# Patient Record
Sex: FEMALE | Race: WHITE | NOT HISPANIC OR LATINO | URBAN - METROPOLITAN AREA
[De-identification: names, ages, dates, MRNs, and addresses within clinical notes are randomized per-mention and may not be internally consistent; named-entity substitution may affect disease eponyms.]

---

## 2017-04-03 ENCOUNTER — INPATIENT (INPATIENT)
Facility: HOSPITAL | Age: 72
LOS: 3 days | Discharge: HOME | End: 2017-04-07
Attending: INTERNAL MEDICINE | Admitting: INTERNAL MEDICINE

## 2017-04-27 PROBLEM — Z00.00 ENCOUNTER FOR PREVENTIVE HEALTH EXAMINATION: Status: ACTIVE | Noted: 2017-04-27

## 2017-06-27 DIAGNOSIS — F17.200 NICOTINE DEPENDENCE, UNSPECIFIED, UNCOMPLICATED: ICD-10-CM

## 2017-06-27 DIAGNOSIS — J44.1 CHRONIC OBSTRUCTIVE PULMONARY DISEASE WITH (ACUTE) EXACERBATION: ICD-10-CM

## 2017-06-27 DIAGNOSIS — I50.9 HEART FAILURE, UNSPECIFIED: ICD-10-CM

## 2017-06-27 DIAGNOSIS — J44.9 CHRONIC OBSTRUCTIVE PULMONARY DISEASE, UNSPECIFIED: ICD-10-CM

## 2017-06-27 DIAGNOSIS — K21.9 GASTRO-ESOPHAGEAL REFLUX DISEASE WITHOUT ESOPHAGITIS: ICD-10-CM

## 2017-06-27 DIAGNOSIS — J96.10 CHRONIC RESPIRATORY FAILURE, UNSPECIFIED WHETHER WITH HYPOXIA OR HYPERCAPNIA: ICD-10-CM

## 2017-06-27 DIAGNOSIS — F41.1 GENERALIZED ANXIETY DISORDER: ICD-10-CM

## 2017-06-27 DIAGNOSIS — I11.0 HYPERTENSIVE HEART DISEASE WITH HEART FAILURE: ICD-10-CM

## 2017-06-27 DIAGNOSIS — Z99.81 DEPENDENCE ON SUPPLEMENTAL OXYGEN: ICD-10-CM

## 2017-07-26 ENCOUNTER — INPATIENT (INPATIENT)
Facility: HOSPITAL | Age: 72
LOS: 3 days | Discharge: HOME | End: 2017-07-30
Attending: INTERNAL MEDICINE | Admitting: INTERNAL MEDICINE

## 2017-07-26 DIAGNOSIS — J44.1 CHRONIC OBSTRUCTIVE PULMONARY DISEASE WITH (ACUTE) EXACERBATION: ICD-10-CM

## 2017-07-26 DIAGNOSIS — I10 ESSENTIAL (PRIMARY) HYPERTENSION: ICD-10-CM

## 2017-08-01 DIAGNOSIS — J44.1 CHRONIC OBSTRUCTIVE PULMONARY DISEASE WITH (ACUTE) EXACERBATION: ICD-10-CM

## 2017-08-01 DIAGNOSIS — Z87.891 PERSONAL HISTORY OF NICOTINE DEPENDENCE: ICD-10-CM

## 2017-08-01 DIAGNOSIS — K21.9 GASTRO-ESOPHAGEAL REFLUX DISEASE WITHOUT ESOPHAGITIS: ICD-10-CM

## 2017-08-01 DIAGNOSIS — J20.9 ACUTE BRONCHITIS, UNSPECIFIED: ICD-10-CM

## 2017-08-01 DIAGNOSIS — F41.9 ANXIETY DISORDER, UNSPECIFIED: ICD-10-CM

## 2017-08-01 DIAGNOSIS — Z99.81 DEPENDENCE ON SUPPLEMENTAL OXYGEN: ICD-10-CM

## 2017-08-01 DIAGNOSIS — R06.02 SHORTNESS OF BREATH: ICD-10-CM

## 2017-08-01 DIAGNOSIS — I10 ESSENTIAL (PRIMARY) HYPERTENSION: ICD-10-CM

## 2017-08-01 DIAGNOSIS — J44.0 CHRONIC OBSTRUCTIVE PULMONARY DISEASE WITH ACUTE LOWER RESPIRATORY INFECTION: ICD-10-CM

## 2017-09-06 ENCOUNTER — INPATIENT (INPATIENT)
Facility: HOSPITAL | Age: 72
LOS: 2 days | Discharge: HOME | End: 2017-09-09
Attending: INTERNAL MEDICINE | Admitting: INTERNAL MEDICINE

## 2017-09-06 DIAGNOSIS — J44.1 CHRONIC OBSTRUCTIVE PULMONARY DISEASE WITH (ACUTE) EXACERBATION: ICD-10-CM

## 2017-09-06 DIAGNOSIS — I10 ESSENTIAL (PRIMARY) HYPERTENSION: ICD-10-CM

## 2017-09-12 DIAGNOSIS — Z72.0 TOBACCO USE: ICD-10-CM

## 2017-09-12 DIAGNOSIS — I10 ESSENTIAL (PRIMARY) HYPERTENSION: ICD-10-CM

## 2017-09-12 DIAGNOSIS — F43.22 ADJUSTMENT DISORDER WITH ANXIETY: ICD-10-CM

## 2017-09-12 DIAGNOSIS — R26.9 UNSPECIFIED ABNORMALITIES OF GAIT AND MOBILITY: ICD-10-CM

## 2017-09-12 DIAGNOSIS — J44.1 CHRONIC OBSTRUCTIVE PULMONARY DISEASE WITH (ACUTE) EXACERBATION: ICD-10-CM

## 2017-09-12 DIAGNOSIS — F41.1 GENERALIZED ANXIETY DISORDER: ICD-10-CM

## 2017-09-12 DIAGNOSIS — Z99.81 DEPENDENCE ON SUPPLEMENTAL OXYGEN: ICD-10-CM

## 2017-09-12 DIAGNOSIS — K21.9 GASTRO-ESOPHAGEAL REFLUX DISEASE WITHOUT ESOPHAGITIS: ICD-10-CM

## 2017-10-30 ENCOUNTER — INPATIENT (INPATIENT)
Facility: HOSPITAL | Age: 72
LOS: 3 days | Discharge: HOME | End: 2017-11-03
Attending: INTERNAL MEDICINE | Admitting: INTERNAL MEDICINE

## 2017-10-30 DIAGNOSIS — J44.1 CHRONIC OBSTRUCTIVE PULMONARY DISEASE WITH (ACUTE) EXACERBATION: ICD-10-CM

## 2017-10-30 DIAGNOSIS — I10 ESSENTIAL (PRIMARY) HYPERTENSION: ICD-10-CM

## 2017-11-07 DIAGNOSIS — F41.9 ANXIETY DISORDER, UNSPECIFIED: ICD-10-CM

## 2017-11-07 DIAGNOSIS — K21.9 GASTRO-ESOPHAGEAL REFLUX DISEASE WITHOUT ESOPHAGITIS: ICD-10-CM

## 2017-11-07 DIAGNOSIS — Z72.0 TOBACCO USE: ICD-10-CM

## 2017-11-07 DIAGNOSIS — I10 ESSENTIAL (PRIMARY) HYPERTENSION: ICD-10-CM

## 2017-11-07 DIAGNOSIS — J44.1 CHRONIC OBSTRUCTIVE PULMONARY DISEASE WITH (ACUTE) EXACERBATION: ICD-10-CM

## 2018-07-15 ENCOUNTER — TRANSCRIPTION ENCOUNTER (OUTPATIENT)
Age: 73
End: 2018-07-15

## 2019-05-23 ENCOUNTER — INPATIENT (INPATIENT)
Facility: HOSPITAL | Age: 74
LOS: 4 days | Discharge: HOME HEALTH | End: 2019-05-28
Attending: HOSPITALIST | Admitting: HOSPITALIST
Payer: MEDICARE

## 2019-05-23 VITALS
TEMPERATURE: 97 F | WEIGHT: 132.06 LBS | SYSTOLIC BLOOD PRESSURE: 184 MMHG | OXYGEN SATURATION: 100 % | HEART RATE: 100 BPM | DIASTOLIC BLOOD PRESSURE: 130 MMHG | HEIGHT: 62 IN | RESPIRATION RATE: 47 BRPM

## 2019-05-23 DIAGNOSIS — Z90.710 ACQUIRED ABSENCE OF BOTH CERVIX AND UTERUS: Chronic | ICD-10-CM

## 2019-05-23 DIAGNOSIS — Z90.49 ACQUIRED ABSENCE OF OTHER SPECIFIED PARTS OF DIGESTIVE TRACT: Chronic | ICD-10-CM

## 2019-05-23 LAB
ALBUMIN SERPL ELPH-MCNC: 4.4 G/DL — SIGNIFICANT CHANGE UP (ref 3.5–5.2)
ALP SERPL-CCNC: 98 U/L — SIGNIFICANT CHANGE UP (ref 30–115)
ALT FLD-CCNC: 14 U/L — SIGNIFICANT CHANGE UP (ref 0–41)
ANION GAP SERPL CALC-SCNC: 12 MMOL/L — SIGNIFICANT CHANGE UP (ref 7–14)
APPEARANCE UR: CLEAR — SIGNIFICANT CHANGE UP
APTT BLD: 29.2 SEC — SIGNIFICANT CHANGE UP (ref 27–39.2)
AST SERPL-CCNC: 14 U/L — SIGNIFICANT CHANGE UP (ref 0–41)
BASE EXCESS BLDV CALC-SCNC: 4.9 MMOL/L — HIGH (ref -2–2)
BASOPHILS # BLD AUTO: 0.07 K/UL — SIGNIFICANT CHANGE UP (ref 0–0.2)
BASOPHILS NFR BLD AUTO: 0.5 % — SIGNIFICANT CHANGE UP (ref 0–1)
BILIRUB SERPL-MCNC: 0.5 MG/DL — SIGNIFICANT CHANGE UP (ref 0.2–1.2)
BILIRUB UR-MCNC: NEGATIVE — SIGNIFICANT CHANGE UP
BUN SERPL-MCNC: 11 MG/DL — SIGNIFICANT CHANGE UP (ref 10–20)
CA-I SERPL-SCNC: 1.24 MMOL/L — SIGNIFICANT CHANGE UP (ref 1.12–1.3)
CALCIUM SERPL-MCNC: 9.7 MG/DL — SIGNIFICANT CHANGE UP (ref 8.5–10.1)
CHLORIDE SERPL-SCNC: 97 MMOL/L — LOW (ref 98–110)
CO2 SERPL-SCNC: 28 MMOL/L — SIGNIFICANT CHANGE UP (ref 17–32)
COLOR SPEC: YELLOW — SIGNIFICANT CHANGE UP
CREAT SERPL-MCNC: 0.7 MG/DL — SIGNIFICANT CHANGE UP (ref 0.7–1.5)
DIFF PNL FLD: ABNORMAL
EOSINOPHIL # BLD AUTO: 0.3 K/UL — SIGNIFICANT CHANGE UP (ref 0–0.7)
EOSINOPHIL NFR BLD AUTO: 2.1 % — SIGNIFICANT CHANGE UP (ref 0–8)
EPI CELLS # UR: ABNORMAL /HPF
GAS PNL BLDA: SIGNIFICANT CHANGE UP
GAS PNL BLDV: 140 MMOL/L — SIGNIFICANT CHANGE UP (ref 136–145)
GAS PNL BLDV: SIGNIFICANT CHANGE UP
GLUCOSE SERPL-MCNC: 149 MG/DL — HIGH (ref 70–99)
GLUCOSE UR QL: NEGATIVE MG/DL — SIGNIFICANT CHANGE UP
HCO3 BLDV-SCNC: 34 MMOL/L — HIGH (ref 22–29)
HCT VFR BLD CALC: 44.9 % — SIGNIFICANT CHANGE UP (ref 37–47)
HCT VFR BLDA CALC: 45.9 % — HIGH (ref 34–44)
HGB BLD CALC-MCNC: 15 G/DL — SIGNIFICANT CHANGE UP (ref 14–18)
HGB BLD-MCNC: 14.2 G/DL — SIGNIFICANT CHANGE UP (ref 12–16)
IMM GRANULOCYTES NFR BLD AUTO: 0.7 % — HIGH (ref 0.1–0.3)
INR BLD: 0.96 RATIO — SIGNIFICANT CHANGE UP (ref 0.65–1.3)
KETONES UR-MCNC: NEGATIVE — SIGNIFICANT CHANGE UP
LACTATE BLDV-MCNC: 0.7 MMOL/L — SIGNIFICANT CHANGE UP (ref 0.5–1.6)
LEUKOCYTE ESTERASE UR-ACNC: NEGATIVE — SIGNIFICANT CHANGE UP
LYMPHOCYTES # BLD AUTO: 1.44 K/UL — SIGNIFICANT CHANGE UP (ref 1.2–3.4)
LYMPHOCYTES # BLD AUTO: 9.9 % — LOW (ref 20.5–51.1)
MCHC RBC-ENTMCNC: 31.1 PG — HIGH (ref 27–31)
MCHC RBC-ENTMCNC: 31.6 G/DL — LOW (ref 32–37)
MCV RBC AUTO: 98.2 FL — SIGNIFICANT CHANGE UP (ref 81–99)
MONOCYTES # BLD AUTO: 0.91 K/UL — HIGH (ref 0.1–0.6)
MONOCYTES NFR BLD AUTO: 6.2 % — SIGNIFICANT CHANGE UP (ref 1.7–9.3)
NEUTROPHILS # BLD AUTO: 11.75 K/UL — HIGH (ref 1.4–6.5)
NEUTROPHILS NFR BLD AUTO: 80.6 % — HIGH (ref 42.2–75.2)
NITRITE UR-MCNC: NEGATIVE — SIGNIFICANT CHANGE UP
NRBC # BLD: 0 /100 WBCS — SIGNIFICANT CHANGE UP (ref 0–0)
NT-PROBNP SERPL-SCNC: 137 PG/ML — SIGNIFICANT CHANGE UP (ref 0–300)
PCO2 BLDV: 70 MMHG — HIGH (ref 41–51)
PH BLDV: 7.3 — SIGNIFICANT CHANGE UP (ref 7.26–7.43)
PH UR: 7 — SIGNIFICANT CHANGE UP (ref 5–8)
PLATELET # BLD AUTO: 307 K/UL — SIGNIFICANT CHANGE UP (ref 130–400)
PO2 BLDV: 71 MMHG — HIGH (ref 20–40)
POTASSIUM BLDV-SCNC: 4.3 MMOL/L — SIGNIFICANT CHANGE UP (ref 3.3–5.6)
POTASSIUM SERPL-MCNC: 4.7 MMOL/L — SIGNIFICANT CHANGE UP (ref 3.5–5)
POTASSIUM SERPL-SCNC: 4.7 MMOL/L — SIGNIFICANT CHANGE UP (ref 3.5–5)
PROT SERPL-MCNC: 7.1 G/DL — SIGNIFICANT CHANGE UP (ref 6–8)
PROT UR-MCNC: ABNORMAL MG/DL
PROTHROM AB SERPL-ACNC: 11 SEC — SIGNIFICANT CHANGE UP (ref 9.95–12.87)
RBC # BLD: 4.57 M/UL — SIGNIFICANT CHANGE UP (ref 4.2–5.4)
RBC # FLD: 13 % — SIGNIFICANT CHANGE UP (ref 11.5–14.5)
RBC CASTS # UR COMP ASSIST: ABNORMAL /HPF
SAO2 % BLDV: 93 % — SIGNIFICANT CHANGE UP
SODIUM SERPL-SCNC: 137 MMOL/L — SIGNIFICANT CHANGE UP (ref 135–146)
SP GR SPEC: 1.01 — SIGNIFICANT CHANGE UP (ref 1.01–1.03)
TROPONIN T SERPL-MCNC: <0.01 NG/ML — SIGNIFICANT CHANGE UP
UROBILINOGEN FLD QL: 0.2 MG/DL — SIGNIFICANT CHANGE UP (ref 0.2–0.2)
WBC # BLD: 14.57 K/UL — HIGH (ref 4.8–10.8)
WBC # FLD AUTO: 14.57 K/UL — HIGH (ref 4.8–10.8)

## 2019-05-23 PROCEDURE — 76604 US EXAM CHEST: CPT | Mod: 26,GC

## 2019-05-23 PROCEDURE — 99291 CRITICAL CARE FIRST HOUR: CPT | Mod: 25,GC

## 2019-05-23 PROCEDURE — 71045 X-RAY EXAM CHEST 1 VIEW: CPT | Mod: 26

## 2019-05-23 PROCEDURE — 93010 ELECTROCARDIOGRAM REPORT: CPT

## 2019-05-23 RX ORDER — IPRATROPIUM/ALBUTEROL SULFATE 18-103MCG
3 AEROSOL WITH ADAPTER (GRAM) INHALATION
Refills: 0 | Status: COMPLETED | OUTPATIENT
Start: 2019-05-23 | End: 2019-05-23

## 2019-05-23 RX ORDER — ACETAMINOPHEN 500 MG
650 TABLET ORAL EVERY 6 HOURS
Refills: 0 | Status: DISCONTINUED | OUTPATIENT
Start: 2019-05-23 | End: 2019-05-28

## 2019-05-23 RX ORDER — AMLODIPINE BESYLATE 2.5 MG/1
5 TABLET ORAL DAILY
Refills: 0 | Status: DISCONTINUED | OUTPATIENT
Start: 2019-05-23 | End: 2019-05-27

## 2019-05-23 RX ORDER — ENOXAPARIN SODIUM 100 MG/ML
40 INJECTION SUBCUTANEOUS DAILY
Refills: 0 | Status: DISCONTINUED | OUTPATIENT
Start: 2019-05-23 | End: 2019-05-28

## 2019-05-23 RX ORDER — TIOTROPIUM BROMIDE 18 UG/1
1 CAPSULE ORAL; RESPIRATORY (INHALATION) DAILY
Refills: 0 | Status: DISCONTINUED | OUTPATIENT
Start: 2019-05-23 | End: 2019-05-24

## 2019-05-23 RX ORDER — CEFTRIAXONE 500 MG/1
1 INJECTION, POWDER, FOR SOLUTION INTRAMUSCULAR; INTRAVENOUS ONCE
Refills: 0 | Status: COMPLETED | OUTPATIENT
Start: 2019-05-23 | End: 2019-05-23

## 2019-05-23 RX ORDER — ALPRAZOLAM 0.25 MG
0.25 TABLET ORAL
Refills: 0 | Status: DISCONTINUED | OUTPATIENT
Start: 2019-05-23 | End: 2019-05-28

## 2019-05-23 RX ORDER — IPRATROPIUM/ALBUTEROL SULFATE 18-103MCG
3 AEROSOL WITH ADAPTER (GRAM) INHALATION EVERY 4 HOURS
Refills: 0 | Status: DISCONTINUED | OUTPATIENT
Start: 2019-05-23 | End: 2019-05-28

## 2019-05-23 RX ORDER — CEFTRIAXONE 500 MG/1
INJECTION, POWDER, FOR SOLUTION INTRAMUSCULAR; INTRAVENOUS
Refills: 0 | Status: DISCONTINUED | OUTPATIENT
Start: 2019-05-23 | End: 2019-05-24

## 2019-05-23 RX ORDER — ONDANSETRON 8 MG/1
4 TABLET, FILM COATED ORAL ONCE
Refills: 0 | Status: COMPLETED | OUTPATIENT
Start: 2019-05-23 | End: 2019-05-23

## 2019-05-23 RX ORDER — BUDESONIDE AND FORMOTEROL FUMARATE DIHYDRATE 160; 4.5 UG/1; UG/1
2 AEROSOL RESPIRATORY (INHALATION)
Refills: 0 | Status: DISCONTINUED | OUTPATIENT
Start: 2019-05-23 | End: 2019-05-28

## 2019-05-23 RX ORDER — CEFTRIAXONE 500 MG/1
1 INJECTION, POWDER, FOR SOLUTION INTRAMUSCULAR; INTRAVENOUS EVERY 24 HOURS
Refills: 0 | Status: DISCONTINUED | OUTPATIENT
Start: 2019-05-24 | End: 2019-05-24

## 2019-05-23 RX ORDER — CHLORHEXIDINE GLUCONATE 213 G/1000ML
1 SOLUTION TOPICAL ONCE
Refills: 0 | Status: COMPLETED | OUTPATIENT
Start: 2019-05-23 | End: 2019-05-23

## 2019-05-23 RX ADMIN — Medication 650 MILLIGRAM(S): at 20:53

## 2019-05-23 RX ADMIN — Medication 60 MILLIGRAM(S): at 21:05

## 2019-05-23 RX ADMIN — AMLODIPINE BESYLATE 5 MILLIGRAM(S): 2.5 TABLET ORAL at 15:15

## 2019-05-23 RX ADMIN — Medication 3 MILLILITER(S): at 09:45

## 2019-05-23 RX ADMIN — Medication 5 MILLIGRAM(S): at 15:15

## 2019-05-23 RX ADMIN — Medication 0.25 MILLIGRAM(S): at 15:16

## 2019-05-23 RX ADMIN — Medication 0.25 MILLIGRAM(S): at 22:50

## 2019-05-23 RX ADMIN — TIOTROPIUM BROMIDE 1 CAPSULE(S): 18 CAPSULE ORAL; RESPIRATORY (INHALATION) at 17:42

## 2019-05-23 RX ADMIN — Medication 3 MILLILITER(S): at 17:25

## 2019-05-23 RX ADMIN — Medication 3 MILLILITER(S): at 09:25

## 2019-05-23 RX ADMIN — Medication 3 MILLILITER(S): at 22:44

## 2019-05-23 RX ADMIN — Medication 3 MILLILITER(S): at 09:44

## 2019-05-23 RX ADMIN — Medication 125 MILLIGRAM(S): at 09:24

## 2019-05-23 RX ADMIN — CEFTRIAXONE 100 GRAM(S): 500 INJECTION, POWDER, FOR SOLUTION INTRAMUSCULAR; INTRAVENOUS at 17:38

## 2019-05-23 RX ADMIN — ONDANSETRON 4 MILLIGRAM(S): 8 TABLET, FILM COATED ORAL at 09:24

## 2019-05-23 NOTE — H&P ADULT - NSICDXPASTMEDICALHX_GEN_ALL_CORE_FT
PAST MEDICAL HISTORY:  Anxiety     COPD (chronic obstructive pulmonary disease)     HTN (hypertension)

## 2019-05-23 NOTE — ED PROVIDER NOTE - PHYSICAL EXAMINATION
CONSTITUTIONAL: Well-developed; well-nourished; in no acute distress.   SKIN: warm, dry  HEAD: Normocephalic; atraumatic.  EYES: PERRL, no conjunctival erythema  ENT: No nasal discharge; airway clear.  NECK: Supple; non tender.  CARD: S1, S2 normal;  Regular rate and rhythm.   RESP: + wheezing bilaterally, + tachypnea, + retractions, speaking few words at a time.   ABD: soft non tender, non distended, no rebound or guarding  EXT: Normal ROM.    LYMPH: No acute cervical adenopathy.  NEURO: Alert, oriented, grossly unremarkable.

## 2019-05-23 NOTE — ED PROVIDER NOTE - OBJECTIVE STATEMENT
72 yo F pmh of HTN, COPD presents with shortness of breath that started at 11pm last night and was worsening throughout the night. This morning felt like she couldn't breath so called EMS. When EMS arrived, found patient to be hypoxic to 80% RA improved to 90s with oxygen. Patient denies any chest pain. + N, no vomiting or diarrhea, no abdominal pain. no fevers or recent illness.

## 2019-05-23 NOTE — ED ADULT NURSE NOTE - INTERVENTIONS DEFINITIONS
Provide visual cue, wrist band, yellow gown, etc./Monitor for mental status changes and reorient to person, place, and time/Review medications for side effects contributing to fall risk/Stretcher in lowest position, wheels locked, appropriate side rails in place/Reinforce activity limits and safety measures with patient and family/Room bathroom lighting operational/Non-slip footwear when patient is off stretcher/Physically safe environment: no spills, clutter or unnecessary equipment/Provide visual clues: red socks/Monitor gait and stability/[place] to call system/Instruct patient to call for assistance/Call bell, personal items and telephone within reach

## 2019-05-23 NOTE — H&P ADULT - NSHPPHYSICALEXAM_GEN_ALL_CORE
T(F): 97.4 (05-23-19 @ 09:15), Max: 97.4 (05-23-19 @ 09:15)  HR: 82 (05-23-19 @ 12:02) (65 - 100)  BP: 111/80 (05-23-19 @ 12:02) (109/72 - 184/130)  RR: 25 (05-23-19 @ 12:02) (21 - 47)  SpO2: 99% (05-23-19 @ 12:02) (91% - 100%)    PHYSICAL EXAM:  GEN: No acute distress  HEENT:   LUNGS: Clear to auscultation bilaterally   HEART: S1/S2 present. RRR.   ABD: Soft, non-tender, non-distended. Bowel sounds present  EXT:  NEURO: AAOX3 T(F): 97.4 (05-23-19 @ 09:15), Max: 97.4 (05-23-19 @ 09:15)  HR: 82 (05-23-19 @ 12:02) (65 - 100)  BP: 111/80 (05-23-19 @ 12:02) (109/72 - 184/130)  RR: 25 (05-23-19 @ 12:02) (21 - 47)  SpO2: 99% (05-23-19 @ 12:02) (91% - 100%)    PHYSICAL EXAM:  GEN: No acute distress  HEENT: wnl  LUNGS: no wheezing buy overall decreased BS throughout  HEART: S1/S2 present. RRR   ABD: Soft, non-tender, non-distended.  EXT: no edema  NEURO: AAOX3

## 2019-05-23 NOTE — ED PROVIDER NOTE - NS ED ROS FT
Eyes:  No visual changes, eye pain or discharge.  ENMT:  no sore throat or runny nose  Cardiac:  No chest pain,   Respiratory:  No cough + difficulty breathing that started at 11pm last night and continued to worsen.    GI:  +nausea, no vomiting, diarrhea or abdominal pain.  :  No dysuria, frequency or burning.  MS:  No joint pain or back pain.  Neuro:  No headache  Skin:  No skin rash.   Endocrine: No history of thyroid disease or diabetes.

## 2019-05-23 NOTE — ED ADULT NURSE NOTE - PMH
COPD (chronic obstructive pulmonary disease)    HTN (hypertension) Anxiety    COPD (chronic obstructive pulmonary disease)    HTN (hypertension)

## 2019-05-23 NOTE — H&P ADULT - NSICDXFAMILYHX_GEN_ALL_CORE_FT
FAMILY HISTORY:  Family history of congestive heart failure  Family history of COPD (chronic obstructive pulmonary disease), In father

## 2019-05-23 NOTE — H&P ADULT - ATTENDING COMMENTS
Pt seen and examined at bedside independently. Pt had recent COPD exacerbation 2 weeks ago. Currently on prednisone taper. Recent exposure to bleach in her home. Last night had worsening SOB. No change in cough, sputum amount or purulence. Uses o2 PRN and at night, had increased O2 needs. Used numerous nebulizer treatments without improvement. Pt's sister says she has obsessive tendencies and sometimes these obsessions lead to panic attacks which causes her to feel SOB.     Today pt felt progressively worse. She called an ambulance, her o2 was in low 80s. When she came to ED she was in respiratory distress, had diffuse wheezing. She was placed on bipap and given solumedrol with significant improvement.    PHYSICAL EXAM:  GENERAL: NAD, speaks in full sentences, no signs of respiratory distress  HEAD:  Atraumatic, Normocephalic  EYES: Anicteric  NECK: Supple, No JVD  CHEST/LUNG: Decreased air movement bilaterally, no wheeing  HEART: Regular rate and rhythm; No murmurs;   ABDOMEN: Soft, Nontender, Nondistended; Bowel sounds present; No guarding  EXTREMITIES:  2+ Peripheral Pulses, No cyanosis or edema  PSYCH: AAOx3  NEUROLOGY: non-focal  SKIN: No rashes or lesions    Shortness of breath/ Acute on chronic hypoxic respiratory failure  -likely due to COPD exacerbation  -o2 PRN  -spo2 goal 92%  -c/w solumedrol, transition to prednisone in am  -no need for antibiotics at this point and no change in amount or purulence of sputum.    Anxiety/obsessive  tendencies  -pt requesting to speak with psychiatry  -pt started on paxil yesterday by PMD  -xanax dose to be confirmed by pharmacy.

## 2019-05-23 NOTE — ED PROVIDER NOTE - CRITICAL CARE PROVIDED
interpretation of diagnostic studies/consultation with other physicians/conducted a detailed discussion of DNR status/documentation/additional history taking/direct patient care (not related to procedure)

## 2019-05-23 NOTE — H&P ADULT - HISTORY OF PRESENT ILLNESS
73 F with PMH of HTN/GERD/COPD on prn oxygen 2L, Pulm- DR. Zhang, comes in with worsening SOB since 1 day, last night she took nebs 3 times and her inhalers, and did not improve. she had worsening of her baseline sob 2 weeks back and she took prednisone taper 20 mg for 1 week and 10 mg for this week as prescribed by dr. zhang. former smoker- quit 1 year ago. Denies fever/chills/ n/v/abd pain/diarrhoea/any worsening cough/sputum.   she lives with son and sister lives upstairs. walks w/o assistance although has a walker.   in ED- received solumedrol, alb nebs. was placed on NIV 12/6 40%, now feels much better and was weaned off NIV during admisison. 73 F with PMH of HTN/GERD/COPD on prn oxygen 2L, Pulm- DR. Zhang, comes in with worsening SOB since 1 day, last night she took nebs 3 times and her inhalers, and did not improve. she had worsening of her baseline sob 2 weeks back and she took prednisone taper 20 mg for 1 week and 10 mg for this week as prescribed by dr. zhang. she does have mild cough at baseline. former smoker- quit 1 year ago. Denies fever/chills/ n/v/abd pain/diarrhoea/any worsening sputum.   she lives with son and sister lives upstairs. walks w/o assistance although has a walker.   in ED- received solumedrol, alb nebs. was placed on NIV 12/6 40%, now feels much better and was weaned off NIV during admisison.

## 2019-05-23 NOTE — ED PROVIDER NOTE - ATTENDING CONTRIBUTION TO CARE
Patient was a pre-arrival notification for acute respiratory distress with EMS. Respiratory called and Critical Care team awaited patient arrival.  Patient in acute respiratory distress with tachypnea and retractions.  She was immediately placed on BIPAP.  Bedside lung ultrasound done and shows no signs of fluid overload. Lung exam consistent with wheezing and clinical picture is a COPD exacerbation. Patient closely watched at bedside on BIPAP, large bore IV placed and labs sent.  Initial venous gas consistent with acute respiratory acidosis.  EKG as documented and shows no acute ischemia.  CXR, labs, urine being done.    Patient improving on BIPAP.  More history taken and as follows:    72 yo F PMH HTN, COPD presents with shortness of breath that started at 11pm last night and was worsening throughout the night. This morning felt like she couldn't breath so called EMS. When EMS arrived, found patient to be hypoxic to 80% RA improved to 90s with oxygen. Patient denies any chest pain. + N, no vomiting or diarrhea, no abdominal pain. no fevers or recent illness.    Patient has expiratory wheezing on exam.  No calf tenderness or peripheral edema.

## 2019-05-23 NOTE — ED PROVIDER NOTE - CLINICAL SUMMARY MEDICAL DECISION MAKING FREE TEXT BOX
Patient was a pre-arrival notification for acute respiratory distress with EMS. Respiratory called and Critical Care team awaited patient arrival.  Patient in acute respiratory distress with tachypnea and retractions.  She was immediately placed on BIPAP.  Bedside lung ultrasound done and shows no signs of fluid overload. Lung exam consistent with wheezing and clinical picture is a COPD exacerbation. Patient closely watched at bedside on BIPAP, large bore IV placed and labs sent.  Initial venous gas consistent with acute respiratory acidosis.  EKG as documented and shows no acute ischemia.  CXR, labs, urine done. Patient has elevated WBC count.  No pneumonia on CXR.  ABG done and results reviewed.  Patient closely monitored for hours and remained hemodynamically stable.  She shows no signs of tiring out and feel much much improved. Patient using BIPAP prn.  She does not require ICU admission at this time.  Will admit to Medicine for continued steroids and nebs, monitoring.  Patient may require ICU evaluation if her condition worsens. Case endorsed to medical admitting team.

## 2019-05-23 NOTE — H&P ADULT - ASSESSMENT
73 F with PMH of HTN/GERD/COPD on prn oxygen 2L, Pulm- DR. Zhang, comes in with worsening SOB since 1 day, last night she took nebs 3 times and her inhalers, and did not improve. she had worsening of her baseline sob 2 weeks back and she took prednisone taper 20 mg for 1 week and 10 mg for this week as prescribed by dr. zhang. former smoker- quit 1 year ago. Denies fever/chills/ n/v/abd pain/diarrhoea/any worsening cough/sputum.   she lives with son and sister lives upstairs. walks w/o assistance although has a walker.   in ED- received solumedrol, alb nebs. was placed on NIV 12/6 40%, now feels much better and was weaned off NIV during admission.     1- COPD exacerbation  no PNA on CXR, no fever/chills/leucocytosis  c/w solumedrol 60 q8 iv  nebs q4 rtc  start symbicort q12 and spiriva q24    2- HTN- stable  c/w meds    3- Anxiety disorder- takes xanax prn and was started on paxil by PMD as her anxiety was uncontrolled , took first dose yesterday    4- GERD- pepcid    dvt ppx- lovenox 40 q24  oob-chair  chg bath  dash diet 73 F with PMH of HTN/GERD/COPD on prn oxygen 2L, Pulm- DR. Zhang, comes in with worsening SOB since 1 day, last night she took nebs 3 times and her inhalers, and did not improve. she had worsening of her baseline sob 2 weeks back and she took prednisone taper 20 mg for 1 week and 10 mg for this week as prescribed by dr. zhang. former smoker- quit 1 year ago. Denies fever/chills/ n/v/abd pain/diarrhoea/any worsening cough/sputum.   she lives with son and sister lives upstairs. walks w/o assistance although has a walker.   in ED- received solumedrol, alb nebs. was placed on NIV 12/6 40%, now feels much better and was weaned off NIV during admission.     1- COPD exacerbation  no PNA on CXR, no fever/chills/leucocytosis  c/w solumedrol 60 q8 iv  nebs q4 rtc  start symbicort q12 and spiriva q24 in hospital, resume home inhaler upon discharge    2- HTN- stable  c/w meds    3- Anxiety disorder- takes xanax prn and was started on paxil by PMD as her anxiety was uncontrolled , took first dose yesterday    4- GERD- pepcid    dvt ppx- lovenox 40 q24  oob-chair  chg bath  dash diet  note- medrec is per patient but she is not entirely sure of enalapril, xanax and amlodipine dose, pharmcy [facility], - no answer/going to voicemail. medrec needs to be verified by pharmacy. 73 F with PMH of HTN/GERD/COPD on prn oxygen 2L, Pulm- DR. Zhang, comes in with worsening SOB since 1 day, last night she took nebs 3 times and her inhalers, and did not improve. she had worsening of her baseline sob 2 weeks back and she took prednisone taper 20 mg for 1 week and 10 mg for this week as prescribed by dr. zhang. former smoker- quit 1 year ago. Denies fever/chills/ n/v/abd pain/diarrhoea/any worsening cough/sputum.   she lives with son and sister lives upstairs. walks w/o assistance although has a walker.   in ED- received solumedrol, alb nebs. was placed on NIV 12/6 40%, now feels much better and was weaned off NIV during admission.     1- COPD exacerbation, moderate  no PNA on CXR, no fever/chills/leucocytosis  c/w solumedrol 60 q8 iv  nebs q4 rtc  start symbicort q12 and spiriva q24 in hospital, resume home inhaler upon discharge  levoquin 500iv q24    2- HTN- stable  c/w meds    3- Anxiety disorder- takes xanax prn and was started on paxil by PMD as her anxiety was uncontrolled , took first dose yesterday    4- GERD- pepcid    dvt ppx- lovenox 40 q24  oob-chair  chg bath  dash diet  note- medrec is per patient but she is not entirely sure of enalapril, xanax and amlodipine dose, [facility], [facility] needs to be verified by pharmacy. 73 F with PMH of HTN/GERD/COPD on prn oxygen 2L, Pulm- DR. Zhang, comes in with worsening SOB since 1 day, last night she took nebs 3 times and her inhalers, and did not improve. she had worsening of her baseline sob 2 weeks back and she took prednisone taper 20 mg for 1 week and 10 mg for this week as prescribed by dr. zhang. she does have mild cough at baseline. former smoker- quit 1 year ago. Denies fever/chills/ n/v/abd pain/diarrhoea/any worsening sputum.   she lives with son and sister lives upstairs. walks w/o assistance although has a walker.   in ED- received solumedrol, alb nebs. was placed on NIV 12/6 40%, now feels much better and was weaned off NIV during admission.     1- COPD exacerbation, moderate  no PNA on CXR, no fever/chills/leucocytosis  c/w solumedrol 60 q8 iv  nebs q4 rtc  start symbicort q12 and spiriva q24 in hospital, resume home inhaler upon discharge  levoquin 500iv q24, (copd exacerbation with age>65)    2- HTN- stable  c/w meds    3- Anxiety disorder- takes xanax prn and was started on paxil by PMD as her anxiety was uncontrolled , took first dose yesterday    4- GERD- pepcid    dvt ppx- lovenox 40 q24  oob-chair  chg bath  dash diet  note- medrec is per patient but she is not entirely sure of enalapril, xanax and amlodipine dose, pharmcy [facility], - no answer/going to voicemail. medrec needs to be verified by pharmacy. 73 F with PMH of HTN/GERD/COPD on prn oxygen 2L, Pulm- DR. Zhang, comes in with worsening SOB since 1 day, last night she took nebs 3 times and her inhalers, and did not improve. she had worsening of her baseline sob 2 weeks back and she took prednisone taper 20 mg for 1 week and 10 mg for this week as prescribed by dr. zhang. she does have mild cough at baseline. former smoker- quit 1 year ago. Denies fever/chills/ n/v/abd pain/diarrhoea/any worsening sputum.   she lives with son and sister lives upstairs. walks w/o assistance although has a walker.   in ED- received solumedrol, alb nebs. was placed on NIV 12/6 40%, now feels much better and was weaned off NIV during admission.     1- COPD exacerbation, moderate  no PNA on CXR, no fever/chills/leucocytosis  c/w solumedrol 60 q8 iv  nebs q4 rtc  start symbicort q12 and spiriva q24 in hospital, resume home inhaler upon discharge  rocephin 1gmiv q24, (copd exacerbation with age>65), levoquin allergic    2- HTN- stable  c/w meds    3- Anxiety disorder- takes xanax prn and was started on paxil by PMD as her anxiety was uncontrolled , took first dose yesterday    4- GERD- pepcid    dvt ppx- lovenox 40 q24  oob-chair  chg bath  dash diet  note- medrec is per patient but she is not entirely sure of enalapril, xanax and amlodipine dose, pharmcy [facility], - no answer/going to voicemail. medrec needs to be verified by pharmacy.

## 2019-05-23 NOTE — H&P ADULT - NSHPSOCIALHISTORY_GEN_ALL_CORE
former smoker 1ppd for 50 yrs , quit 1 year back  denies alcohol or drugs former smoker 1ppd for 50 yrs , quit 1 year back  denies alcohol or drugs  lives at home with sister

## 2019-05-24 ENCOUNTER — TRANSCRIPTION ENCOUNTER (OUTPATIENT)
Age: 74
End: 2019-05-24

## 2019-05-24 LAB
ANION GAP SERPL CALC-SCNC: 12 MMOL/L — SIGNIFICANT CHANGE UP (ref 7–14)
ANION GAP SERPL CALC-SCNC: 17 MMOL/L — HIGH (ref 7–14)
BUN SERPL-MCNC: 20 MG/DL — SIGNIFICANT CHANGE UP (ref 10–20)
BUN SERPL-MCNC: 37 MG/DL — HIGH (ref 10–20)
CALCIUM SERPL-MCNC: 9.3 MG/DL — SIGNIFICANT CHANGE UP (ref 8.5–10.1)
CALCIUM SERPL-MCNC: 9.5 MG/DL — SIGNIFICANT CHANGE UP (ref 8.5–10.1)
CHLORIDE SERPL-SCNC: 94 MMOL/L — LOW (ref 98–110)
CHLORIDE SERPL-SCNC: 98 MMOL/L — SIGNIFICANT CHANGE UP (ref 98–110)
CO2 SERPL-SCNC: 27 MMOL/L — SIGNIFICANT CHANGE UP (ref 17–32)
CO2 SERPL-SCNC: 30 MMOL/L — SIGNIFICANT CHANGE UP (ref 17–32)
CREAT SERPL-MCNC: 1.1 MG/DL — SIGNIFICANT CHANGE UP (ref 0.7–1.5)
CREAT SERPL-MCNC: 1.6 MG/DL — HIGH (ref 0.7–1.5)
GLUCOSE SERPL-MCNC: 142 MG/DL — HIGH (ref 70–99)
GLUCOSE SERPL-MCNC: 194 MG/DL — HIGH (ref 70–99)
HCT VFR BLD CALC: 42.2 % — SIGNIFICANT CHANGE UP (ref 37–47)
HGB BLD-MCNC: 13.1 G/DL — SIGNIFICANT CHANGE UP (ref 12–16)
MCHC RBC-ENTMCNC: 30.6 PG — SIGNIFICANT CHANGE UP (ref 27–31)
MCHC RBC-ENTMCNC: 31 G/DL — LOW (ref 32–37)
MCV RBC AUTO: 98.6 FL — SIGNIFICANT CHANGE UP (ref 81–99)
NRBC # BLD: 0 /100 WBCS — SIGNIFICANT CHANGE UP (ref 0–0)
PLATELET # BLD AUTO: 267 K/UL — SIGNIFICANT CHANGE UP (ref 130–400)
POTASSIUM SERPL-MCNC: 4.7 MMOL/L — SIGNIFICANT CHANGE UP (ref 3.5–5)
POTASSIUM SERPL-MCNC: 5.3 MMOL/L — HIGH (ref 3.5–5)
POTASSIUM SERPL-SCNC: 4.7 MMOL/L — SIGNIFICANT CHANGE UP (ref 3.5–5)
POTASSIUM SERPL-SCNC: 5.3 MMOL/L — HIGH (ref 3.5–5)
RBC # BLD: 4.28 M/UL — SIGNIFICANT CHANGE UP (ref 4.2–5.4)
RBC # FLD: 12.8 % — SIGNIFICANT CHANGE UP (ref 11.5–14.5)
SODIUM SERPL-SCNC: 138 MMOL/L — SIGNIFICANT CHANGE UP (ref 135–146)
SODIUM SERPL-SCNC: 140 MMOL/L — SIGNIFICANT CHANGE UP (ref 135–146)
WBC # BLD: 15 K/UL — HIGH (ref 4.8–10.8)
WBC # FLD AUTO: 15 K/UL — HIGH (ref 4.8–10.8)

## 2019-05-24 PROCEDURE — 93010 ELECTROCARDIOGRAM REPORT: CPT

## 2019-05-24 RX ORDER — AZITHROMYCIN 500 MG/1
TABLET, FILM COATED ORAL
Refills: 0 | Status: DISCONTINUED | OUTPATIENT
Start: 2019-05-24 | End: 2019-05-24

## 2019-05-24 RX ORDER — AZITHROMYCIN 500 MG/1
500 TABLET, FILM COATED ORAL ONCE
Refills: 0 | Status: COMPLETED | OUTPATIENT
Start: 2019-05-24 | End: 2019-05-24

## 2019-05-24 RX ORDER — FAMOTIDINE 10 MG/ML
20 INJECTION INTRAVENOUS AT BEDTIME
Refills: 0 | Status: DISCONTINUED | OUTPATIENT
Start: 2019-05-24 | End: 2019-05-28

## 2019-05-24 RX ORDER — IPRATROPIUM/ALBUTEROL SULFATE 18-103MCG
3 AEROSOL WITH ADAPTER (GRAM) INHALATION EVERY 6 HOURS
Refills: 0 | Status: DISCONTINUED | OUTPATIENT
Start: 2019-05-24 | End: 2019-05-28

## 2019-05-24 RX ORDER — IPRATROPIUM/ALBUTEROL SULFATE 18-103MCG
3 AEROSOL WITH ADAPTER (GRAM) INHALATION
Refills: 0 | Status: COMPLETED | OUTPATIENT
Start: 2019-05-24 | End: 2019-05-24

## 2019-05-24 RX ORDER — CHLORHEXIDINE GLUCONATE 213 G/1000ML
1 SOLUTION TOPICAL
Refills: 0 | Status: DISCONTINUED | OUTPATIENT
Start: 2019-05-24 | End: 2019-05-28

## 2019-05-24 RX ORDER — AZITHROMYCIN 500 MG/1
250 TABLET, FILM COATED ORAL DAILY
Refills: 0 | Status: DISCONTINUED | OUTPATIENT
Start: 2019-05-24 | End: 2019-05-24

## 2019-05-24 RX ORDER — SODIUM CHLORIDE 9 MG/ML
1000 INJECTION INTRAMUSCULAR; INTRAVENOUS; SUBCUTANEOUS
Refills: 0 | Status: DISCONTINUED | OUTPATIENT
Start: 2019-05-24 | End: 2019-05-27

## 2019-05-24 RX ORDER — AZITHROMYCIN 500 MG/1
250 TABLET, FILM COATED ORAL DAILY
Refills: 0 | Status: COMPLETED | OUTPATIENT
Start: 2019-05-24 | End: 2019-05-28

## 2019-05-24 RX ADMIN — ENOXAPARIN SODIUM 40 MILLIGRAM(S): 100 INJECTION SUBCUTANEOUS at 12:02

## 2019-05-24 RX ADMIN — Medication 3 MILLILITER(S): at 09:47

## 2019-05-24 RX ADMIN — Medication 10 MILLIGRAM(S): at 12:02

## 2019-05-24 RX ADMIN — Medication 60 MILLIGRAM(S): at 14:26

## 2019-05-24 RX ADMIN — Medication 650 MILLIGRAM(S): at 03:49

## 2019-05-24 RX ADMIN — Medication 60 MILLIGRAM(S): at 05:57

## 2019-05-24 RX ADMIN — Medication 0.25 MILLIGRAM(S): at 04:19

## 2019-05-24 RX ADMIN — AZITHROMYCIN 255 MILLIGRAM(S): 500 TABLET, FILM COATED ORAL at 12:01

## 2019-05-24 RX ADMIN — Medication 650 MILLIGRAM(S): at 19:48

## 2019-05-24 RX ADMIN — BUDESONIDE AND FORMOTEROL FUMARATE DIHYDRATE 2 PUFF(S): 160; 4.5 AEROSOL RESPIRATORY (INHALATION) at 12:03

## 2019-05-24 RX ADMIN — Medication 3 MILLILITER(S): at 09:31

## 2019-05-24 RX ADMIN — Medication 3 MILLILITER(S): at 19:02

## 2019-05-24 RX ADMIN — TIOTROPIUM BROMIDE 1 CAPSULE(S): 18 CAPSULE ORAL; RESPIRATORY (INHALATION) at 07:21

## 2019-05-24 RX ADMIN — Medication 0.25 MILLIGRAM(S): at 13:27

## 2019-05-24 RX ADMIN — FAMOTIDINE 20 MILLIGRAM(S): 10 INJECTION INTRAVENOUS at 21:45

## 2019-05-24 RX ADMIN — Medication 3 MILLILITER(S): at 09:15

## 2019-05-24 RX ADMIN — Medication 3 MILLILITER(S): at 15:28

## 2019-05-24 RX ADMIN — Medication 5 MILLIGRAM(S): at 05:57

## 2019-05-24 RX ADMIN — BUDESONIDE AND FORMOTEROL FUMARATE DIHYDRATE 2 PUFF(S): 160; 4.5 AEROSOL RESPIRATORY (INHALATION) at 21:44

## 2019-05-24 RX ADMIN — Medication 3 MILLILITER(S): at 07:21

## 2019-05-24 RX ADMIN — SODIUM CHLORIDE 100 MILLILITER(S): 9 INJECTION INTRAMUSCULAR; INTRAVENOUS; SUBCUTANEOUS at 21:44

## 2019-05-24 RX ADMIN — Medication 60 MILLIGRAM(S): at 21:45

## 2019-05-24 RX ADMIN — AMLODIPINE BESYLATE 5 MILLIGRAM(S): 2.5 TABLET ORAL at 05:57

## 2019-05-24 NOTE — PHARMACOTHERAPY INTERVENTION NOTE - COMMENTS
Med Rec completed with the patient and pharmacy.  Discrepancies from inpatient medications including enalapril 5 mg PO daily (K elevated and decrease in renal function) and ranitidine 300 mg PO daily.    Patient reports she receives Incruse at an affordable price and can demonstrate appropriate technique.  Currently on Symbicort in house as well.  Will follow up with discharge Rx to assess cost as this has been an issue in the past Med Rec completed with the patient and pharmacy.  Discrepancies from inpatient medications including enalapril 5 mg PO daily (K elevated and decrease in renal function) and ranitidine 300 mg PO daily.  Discussed with Dr. Corral famotidine PO qHS to be started today     Patient reports she receives Incruse at an affordable price and can demonstrate appropriate technique.  Currently on Symbicort in house as well.  Will follow up with discharge Rx to assess cost as this has been an issue in the past

## 2019-05-24 NOTE — PROGRESS NOTE ADULT - ASSESSMENT
73 F with PMH of HTN, GERD, COPD with chronic hypoxic respiratory failure on prn home o2, no prior intubations, former smoker, here with acute copd exacerbation, and acute on chronic respiratory failure with hypoxia and hypercapnea secondary to above, confounded by underlying anxiety    iv steroid  bronchodilators, nebs around the clock q4hr for now + prn  gi ppx  dvt ppx  supp o2  qhs NIPPV  pulm c/s Rosendo  prn low dose xanax if panic attack  c/w recently started ssri  high risk 73 F with PMH of HTN, GERD, COPD with chronic hypoxic respiratory failure on prn home o2, no prior intubations, former smoker, here with acute copd exacerbation, and acute on chronic respiratory failure with hypoxia and hypercapnea secondary to above, confounded by underlying anxiety    iv steroid  bronchodilators, nebs around the clock q4hr for now + prn  gi ppx  dvt ppx  supp o2  qhs NIPPV  pulm c/s Rosendo  prn low dose xanax if panic attack  c/w recently started ssri  high risk        #Progress Note Handoff    Pending (specify):  Consults____pulm- bruno_____, Tests________, Test Results_______, Other___VERY tight, awaiting improvement in bronchospasm______    Family discussion: plan of care discussed with patient    Disposition: likely home, would treat as an inpatient until AT LEAST Sunday, perhaps longer if bronchospasm not subsiding

## 2019-05-24 NOTE — DISCHARGE NOTE NURSING/CASE MANAGEMENT/SOCIAL WORK - NSDCDPATPORTLINK_GEN_ALL_CORE
You can access the [facility] Portal, offered by [facility], by registering with the following website: [email or web address]

## 2019-05-24 NOTE — PROGRESS NOTE ADULT - SUBJECTIVE AND OBJECTIVE BOX
VENTURA MOCK  73y  Female      Patient is a 73y old  Female who presents with a chief complaint of shortness of breath (24 May 2019 06:33)      INTERVAL HPI/OVERNIGHT EVENTS: reports marginal improvement in sob/wheezing and anxiety since yesterday. more appetite today      REVIEW OF SYSTEMS:  as above  All other review of systems negative    T(C): 36.4 (19 @ 13:25), Max: 36.4 (19 @ 13:25)  HR: 90 (19 @ 13:25) (79 - 93)  BP: 112/58 (19 @ 13:25) (111/68 - 134/97)  RR: 18 (19 @ 13:25) (16 - 20)  SpO2: 92% (19 @ 08:27) (92% - 98%)  Wt(kg): --Vital Signs Last 24 Hrs  T(C): 36.4 (24 May 2019 13:25), Max: 36.4 (24 May 2019 13:25)  T(F): 97.6 (24 May 2019 13:25), Max: 97.6 (24 May 2019 13:25)  HR: 90 (24 May 2019 13:25) (79 - 93)  BP: 112/58 (24 May 2019 13:25) (111/68 - 134/97)  BP(mean): 82 (23 May 2019 21:11) (82 - 82)  RR: 18 (24 May 2019 13:25) (16 - 20)  SpO2: 92% (24 May 2019 08:27) (92% - 98%)        PHYSICAL EXAM:  GENERAL: NAD  PSYCH: no agitation, baseline mentation  NERVOUS SYSTEM:  Alert & Oriented X3, no new focal deficits  PULMONARY: still tight with subtle end expiratory bilateral wheezing, speaking full sentences, on NC, no tripoding or accessory muscle use; no tachypnea  CARDIOVASCULAR: Regular rate and rhythm; No murmurs, rubs, or gallops  GI: Soft, Nontender, Nondistended; Bowel sounds present  EXTREMITIES:  2+ Peripheral Pulses, No clubbing, cyanosis, or edema    Consultant(s) Notes Reviewed:  [x ] YES  [ ] NO    Discussed with Consultants/Other Providers [ x] YES     LABS                          13.1   15.00 )-----------( 267      ( 24 May 2019 06:20 )             42.2         140  |  98  |  20  ----------------------------<  142<H>  5.3<H>   |  30  |  1.1    Ca    9.5      24 May 2019 06:20    TPro  7.1  /  Alb  4.4  /  TBili  0.5  /  DBili  x   /  AST  14  /  ALT  14  /  AlkPhos  98      ABG - ( 23 May 2019 11:45 )  pH, Arterial: 7.32  pH, Blood: x     /  pCO2: 64    /  pO2: 103   / HCO3: 33    / Base Excess: 4.1   /  SaO2: 98                Urinalysis Basic - ( 23 May 2019 14:35 )    Color: Yellow / Appearance: Clear / S.015 / pH: x  Gluc: x / Ketone: Negative  / Bili: Negative / Urobili: 0.2 mg/dL   Blood: x / Protein: Trace mg/dL / Nitrite: Negative   Leuk Esterase: Negative / RBC: 3-5 /HPF / WBC x   Sq Epi: x / Non Sq Epi: Few /HPF / Bacteria: x      PT/INR - ( 23 May 2019 09:28 )   PT: 11.00 sec;   INR: 0.96 ratio         PTT - ( 23 May 2019 09:28 )  PTT:29.2 sec  Lactate Trend    CARDIAC MARKERS ( 23 May 2019 09:28 )  x     / <0.01 ng/mL / x     / x     / x          CAPILLARY BLOOD GLUCOSE            RADIOLOGY & ADDITIONAL TESTS:    Imaging Personally Reviewed:  [ ] YES  [ ] NO    HEALTH ISSUES - PROBLEM Dx:          #Progress Note Handoff    Pending (specify):  Consults_________, Tests________, Test Results_______, Other_________    Family discussion:    Disposition: Home___/SNF___/Other________/Unknown at this time________ VENTURA MOCK  73y  Female      Patient is a 73y old  Female who presents with a chief complaint of shortness of breath (24 May 2019 06:33)      INTERVAL HPI/OVERNIGHT EVENTS: reports marginal improvement in sob/wheezing and anxiety since yesterday. more appetite today      REVIEW OF SYSTEMS:  as above  All other review of systems negative    T(C): 36.4 (19 @ 13:25), Max: 36.4 (19 @ 13:25)  HR: 90 (19 @ 13:25) (79 - 93)  BP: 112/58 (19 @ 13:25) (111/68 - 134/97)  RR: 18 (19 @ 13:25) (16 - 20)  SpO2: 92% (19 @ 08:27) (92% - 98%)  Wt(kg): --Vital Signs Last 24 Hrs  T(C): 36.4 (24 May 2019 13:25), Max: 36.4 (24 May 2019 13:25)  T(F): 97.6 (24 May 2019 13:25), Max: 97.6 (24 May 2019 13:25)  HR: 90 (24 May 2019 13:25) (79 - 93)  BP: 112/58 (24 May 2019 13:25) (111/68 - 134/97)  BP(mean): 82 (23 May 2019 21:11) (82 - 82)  RR: 18 (24 May 2019 13:25) (16 - 20)  SpO2: 92% (24 May 2019 08:27) (92% - 98%)        PHYSICAL EXAM:  GENERAL: NAD  PSYCH: no agitation, baseline mentation  NERVOUS SYSTEM:  Alert & Oriented X3, no new focal deficits  PULMONARY: still tight with subtle end expiratory bilateral wheezing, speaking full sentences, on NC, no tripoding or accessory muscle use; no tachypnea  CARDIOVASCULAR: Regular rate and rhythm; No murmurs, rubs, or gallops  GI: Soft, Nontender, Nondistended; Bowel sounds present  EXTREMITIES:  2+ Peripheral Pulses, No clubbing, cyanosis, or edema    Consultant(s) Notes Reviewed:  [x ] YES  [ ] NO    Discussed with Consultants/Other Providers [ x] YES     LABS                          13.1   15.00 )-----------( 267      ( 24 May 2019 06:20 )             42.2         140  |  98  |  20  ----------------------------<  142<H>  5.3<H>   |  30  |  1.1    Ca    9.5      24 May 2019 06:20    TPro  7.1  /  Alb  4.4  /  TBili  0.5  /  DBili  x   /  AST  14  /  ALT  14  /  AlkPhos  98      ABG - ( 23 May 2019 11:45 )  pH, Arterial: 7.32  pH, Blood: x     /  pCO2: 64    /  pO2: 103   / HCO3: 33    / Base Excess: 4.1   /  SaO2: 98                Urinalysis Basic - ( 23 May 2019 14:35 )    Color: Yellow / Appearance: Clear / S.015 / pH: x  Gluc: x / Ketone: Negative  / Bili: Negative / Urobili: 0.2 mg/dL   Blood: x / Protein: Trace mg/dL / Nitrite: Negative   Leuk Esterase: Negative / RBC: 3-5 /HPF / WBC x   Sq Epi: x / Non Sq Epi: Few /HPF / Bacteria: x      PT/INR - ( 23 May 2019 09:28 )   PT: 11.00 sec;   INR: 0.96 ratio         PTT - ( 23 May 2019 09:28 )  PTT:29.2 sec  Lactate Trend    CARDIAC MARKERS ( 23 May 2019 09:28 )  x     / <0.01 ng/mL / x     / x     / x          CAPILLARY BLOOD GLUCOSE            RADIOLOGY & ADDITIONAL TESTS:    Imaging Personally Reviewed:  [ ] YES  [ ] NO    HEALTH ISSUES - PROBLEM Dx:

## 2019-05-24 NOTE — PROGRESS NOTE ADULT - SUBJECTIVE AND OBJECTIVE BOX
VENTURA OMCK 73y Female  MRN#: [id number]   CODE STATUS:________      SUBJECTIVE  Patient is a 73y old Female who presents with a chief complaint of shortness of breath (23 May 2019 13:34)  Currently admitted to medicine with the primary diagnosis of COPD exacerbation  Hospital course has been complicated by _______.   Today is hospital day 1d, and this morning she is _________ and reports ________ overnight events.     Present Today:           Lo Catheter ()No/ ()Yes? Indication:          Central Line ()No/ ()Yes? Indication:          IV Fluids ()No/ ()Yes? Type:  Rate:  Indication:      OBJECTIVE  PAST MEDICAL & SURGICAL HISTORY  Anxiety  HTN (hypertension)  COPD (chronic obstructive pulmonary disease)  History of hysterectomy  History of cholecystectomy    ALLERGIES:  Levaquin (Other)    MEDICATIONS:  STANDING MEDICATIONS  ALBUTerol/ipratropium for Nebulization 3 milliLiter(s) Nebulizer every 4 hours  amLODIPine   Tablet 5 milliGRAM(s) Oral daily  buDESOnide 160 MICROgram(s)/formoterol 4.5 MICROgram(s) Inhaler 2 Puff(s) Inhalation two times a day  cefTRIAXone   IVPB      cefTRIAXone   IVPB 1 Gram(s) IV Intermittent every 24 hours  enalapril 5 milliGRAM(s) Oral daily  enoxaparin Injectable 40 milliGRAM(s) SubCutaneous daily  methylPREDNISolone sodium succinate Injectable 60 milliGRAM(s) IV Push every 8 hours  PARoxetine 10 milliGRAM(s) Oral daily  tiotropium 18 MICROgram(s) Capsule 1 Capsule(s) Inhalation daily    PRN MEDICATIONS  acetaminophen   Tablet .. 650 milliGRAM(s) Oral every 6 hours PRN  ALPRAZolam 0.25 milliGRAM(s) Oral two times a day PRN      VITAL SIGNS: Last 24 Hours  T(C): 35.8 (24 May 2019 05:41), Max: 36.3 (23 May 2019 09:15)  T(F): 96.4 (24 May 2019 05:41), Max: 97.4 (23 May 2019 09:15)  HR: 79 (24 May 2019 05:41) (65 - 100)  BP: 127/82 (24 May 2019 05:41) (109/72 - 184/130)  BP(mean): 82 (23 May 2019 21:11) (82 - 82)  RR: 18 (24 May 2019 05:41) (16 - 47)  SpO2: 95% (23 May 2019 22:20) (91% - 100%)    LABS:                        14.2   14.57 )-----------( 307      ( 23 May 2019 09:28 )             44.9         137  |  97<L>  |  11  ----------------------------<  149<H>  4.7   |  28  |  0.7    Ca    9.7      23 May 2019 09:28    TPro  7.1  /  Alb  4.4  /  TBili  0.5  /  DBili  x   /  AST  14  /  ALT  14  /  AlkPhos  98      PT/INR - ( 23 May 2019 09:28 )   PT: 11.00 sec;   INR: 0.96 ratio         PTT - ( 23 May 2019 09:28 )  PTT:29.2 sec  Urinalysis Basic - ( 23 May 2019 14:35 )    Color: Yellow / Appearance: Clear / S.015 / pH: x  Gluc: x / Ketone: Negative  / Bili: Negative / Urobili: 0.2 mg/dL   Blood: x / Protein: Trace mg/dL / Nitrite: Negative   Leuk Esterase: Negative / RBC: 3-5 /HPF / WBC x   Sq Epi: x / Non Sq Epi: Few /HPF / Bacteria: x      ABG - ( 23 May 2019 11:45 )  pH, Arterial: 7.32  pH, Blood: x     /  pCO2: 64    /  pO2: 103   / HCO3: 33    / Base Excess: 4.1   /  SaO2: 98                Troponin T, Serum: <0.01 ng/mL (19 @ 09:28)      CARDIAC MARKERS ( 23 May 2019 09:28 )  x     / <0.01 ng/mL / x     / x     / x          RADIOLOGY:      PHYSICAL EXAM:    GENERAL: NAD, well-developed, AAOx3  HEENT:  Atraumatic, Normocephalic. EOMI, PERRLA, conjunctiva and sclera clear, No JVD  PULMONARY: Clear to auscultation bilaterally; No wheeze  CARDIOVASCULAR: Regular rate and rhythm; No murmurs, rubs, or gallops  GASTROINTESTINAL: Soft, Nontender, Nondistended; Bowel sounds present  MUSCULOSKELETAL:  2+ Peripheral Pulses, No clubbing, cyanosis, or edema  NEUROLOGY: non-focal  SKIN: No rashes or lesions      ADMISSION SUMMARY  Patient is a 73y old Female who presents with a chief complaint of shortness of breath (23 May 2019 13:34)  Currently admitted to medicine with the primary diagnosis of COPD exacerbation  73 F with PMH of HTN/GERD/COPD on prn oxygen 2L, Pulm- DR. Robbins, comes in with worsening SOB since 1 day, last night she took nebs 3 times and her inhalers, and did not improve. She had worsening of her baseline sob 2 weeks back and she took prednisone taper 20 mg for 1 week and 10 mg for this week as prescribed by dr. robbins. she does have mild cough at baseline. Former smoker- quit 1 year ago. Denies fever/chills/ n/v/abd pain/diarrhoea/any worsening sputum.  She lives with son and sister lives upstairs. Walks w/o assistance although has a walker.     In ED- received solumedrol, alb nebs. Was placed on NIV 12/ 40%, now feels much better and was weaned off NIV during admission.     ASSESSMENT & PLAN    #COPD exacerbation, moderate  - no PNA on CXR, no fever/chills/leucocytosis  - c/w solumedrol 60 q8 iv  - nebs q4 rtc  - c/w symbicort q12 and spiriva q24 in hospital  - c/w rocephin 1gmiv q24, (copd exacerbation with age>65), levoquin allergic    #HTN- stable  - c/w enalapril, amlodipine } need to confirm doses    #Anxiety disorder  - takes xanax prn and was started on paxil by PMD as her anxiety was uncontrolled , took first dose yesterday    #GERD- pepcid    #MISC  - dvt ppx- lovenox 40 q24  - oob-chair  - dash diet  - Pharmacy: [facility], - medrec needs to be verified by pharmacy.       ( ) Discussion with patient and/or family regarding goals of care  ( ) Discussed Case and Plan with Medical Attending, Name: VENTURA MOCK 73y Female  MRN#: [id number]   CODE STATUS:___Full_____      SUBJECTIVE  Patient is a 73y old Female who presents with a chief complaint of shortness of breath (23 May 2019 13:34)  Currently admitted to medicine with the primary diagnosis of COPD exacerbation  Today is hospital day 1d, and this morning she is feeling improved, breathing near baseline and reports no overnight events.   Patient uses 2L NC PRN, walks independently at home. Lives w/ son but son works.      OBJECTIVE  PAST MEDICAL & SURGICAL HISTORY  Anxiety  HTN (hypertension)  COPD (chronic obstructive pulmonary disease)  History of hysterectomy  History of cholecystectomy    ALLERGIES:  Levaquin (Other)    MEDICATIONS:  STANDING MEDICATIONS  ALBUTerol/ipratropium for Nebulization 3 milliLiter(s) Nebulizer every 4 hours  amLODIPine   Tablet 5 milliGRAM(s) Oral daily  buDESOnide 160 MICROgram(s)/formoterol 4.5 MICROgram(s) Inhaler 2 Puff(s) Inhalation two times a day  cefTRIAXone   IVPB      cefTRIAXone   IVPB 1 Gram(s) IV Intermittent every 24 hours  enalapril 5 milliGRAM(s) Oral daily  enoxaparin Injectable 40 milliGRAM(s) SubCutaneous daily  methylPREDNISolone sodium succinate Injectable 60 milliGRAM(s) IV Push every 8 hours  PARoxetine 10 milliGRAM(s) Oral daily  tiotropium 18 MICROgram(s) Capsule 1 Capsule(s) Inhalation daily    PRN MEDICATIONS  acetaminophen   Tablet .. 650 milliGRAM(s) Oral every 6 hours PRN  ALPRAZolam 0.25 milliGRAM(s) Oral two times a day PRN      VITAL SIGNS: Last 24 Hours  T(C): 35.8 (24 May 2019 05:41), Max: 36.3 (23 May 2019 09:15)  T(F): 96.4 (24 May 2019 05:41), Max: 97.4 (23 May 2019 09:15)  HR: 79 (24 May 2019 05:41) (65 - 100)  BP: 127/82 (24 May 2019 05:41) (109/72 - 184/130)  BP(mean): 82 (23 May 2019 21:11) (82 - 82)  RR: 18 (24 May 2019 05:41) (16 - 47)  SpO2: 95% (23 May 2019 22:20) (91% - 100%)    LABS:                        14.2   14.57 )-----------( 307      ( 23 May 2019 09:28 )             44.9         137  |  97<L>  |  11  ----------------------------<  149<H>  4.7   |  28  |  0.7    Ca    9.7      23 May 2019 09:28    TPro  7.1  /  Alb  4.4  /  TBili  0.5  /  DBili  x   /  AST  14  /  ALT  14  /  AlkPhos  98      PT/INR - ( 23 May 2019 09:28 )   PT: 11.00 sec;   INR: 0.96 ratio         PTT - ( 23 May 2019 09:28 )  PTT:29.2 sec  Urinalysis Basic - ( 23 May 2019 14:35 )    Color: Yellow / Appearance: Clear / S.015 / pH: x  Gluc: x / Ketone: Negative  / Bili: Negative / Urobili: 0.2 mg/dL   Blood: x / Protein: Trace mg/dL / Nitrite: Negative   Leuk Esterase: Negative / RBC: 3-5 /HPF / WBC x   Sq Epi: x / Non Sq Epi: Few /HPF / Bacteria: x      ABG - ( 23 May 2019 11:45 )  pH, Arterial: 7.32  pH, Blood: x     /  pCO2: 64    /  pO2: 103   / HCO3: 33    / Base Excess: 4.1   /  SaO2: 98                Troponin T, Serum: <0.01 ng/mL (19 @ 09:28)      CARDIAC MARKERS ( 23 May 2019 09:28 )  x     / <0.01 ng/mL / x     / x     / x          RADIOLOGY:      PHYSICAL EXAM:    GENERAL: NAD, well-developed, AAOx3  HEENT:  Atraumatic, Normocephalic. EOMI, PERRLA, conjunctiva and sclera clear, No JVD  PULMONARY: Clear to auscultation bilaterally; No wheeze  CARDIOVASCULAR: Regular rate and rhythm; No murmurs, rubs, or gallops  GASTROINTESTINAL: Soft, Nontender, Nondistended; Bowel sounds present  MUSCULOSKELETAL:  2+ Peripheral Pulses, No clubbing, cyanosis, or edema  NEUROLOGY: non-focal  SKIN: No rashes or lesions      ADMISSION SUMMARY  Patient is a 73y old Female who presents with a chief complaint of shortness of breath (23 May 2019 13:34)  Currently admitted to medicine with the primary diagnosis of COPD exacerbation  73 F with PMH of HTN/GERD/COPD on prn oxygen 2L, Pulm- DR. Robbins, comes in with worsening SOB since 1 day, last night she took nebs 3 times and her inhalers, and did not improve. She had worsening of her baseline sob 2 weeks back and she took prednisone taper 20 mg for 1 week and 10 mg for this week as prescribed by dr. robbins. she does have mild cough at baseline. Former smoker- quit 1 year ago. Denies fever/chills/ n/v/abd pain/diarrhoea/any worsening sputum.  She lives with son and sister lives upstairs. Walks w/o assistance although has a walker.     In ED- received solumedrol, alb nebs. Was placed on NIV 12/ 40%, now feels much better and was weaned off NIV during admission.     ASSESSMENT & PLAN    #COPD exacerbation, moderate  - no PNA on CXR, no fever/chills/leucocytosis  - c/w solumedrol 60 q8 iv  - nebs q4 rtc  - c/w symbicort q12 and spiriva q24 in hospital  - c/w rocephin 1gmiv q24, (copd exacerbation with age>65), levoquin allergic    #HTN- stable  - c/w enalapril, amlodipine } need to confirm doses    #Anxiety disorder  - takes xanax prn and was started on paxil by PMD as her anxiety was uncontrolled , took first dose yesterday    #GERD- pepcid    #MISC  - dvt ppx- lovenox 40 q24  - oob-chair  - dash diet  - Pharmacy: [facility], - medrec needs to be verified by pharmacy.       ( ) Discussion with patient and/or family regarding goals of care  ( ) Discussed Case and Plan with Medical Attending, Name: VENTURA MOCK 73y Female  MRN#: [id number]   CODE STATUS:___Full_____      SUBJECTIVE  Patient is a 73y old Female who presents with a chief complaint of shortness of breath (23 May 2019 13:34)  Currently admitted to medicine with the primary diagnosis of COPD exacerbation  Today is hospital day 1d, and this morning she is feeling improved, breathing near baseline and reports no overnight events.   Patient uses 2L NC PRN, walks independently at home. Lives w/ son but son works.    Pt denies active SOB, CP, abd pain. Does endorse urinary frequency.     OBJECTIVE  PAST MEDICAL & SURGICAL HISTORY  Anxiety  HTN (hypertension)  COPD (chronic obstructive pulmonary disease)  History of hysterectomy  History of cholecystectomy    ALLERGIES:  Levaquin (Other)    MEDICATIONS:  STANDING MEDICATIONS  ALBUTerol/ipratropium for Nebulization 3 milliLiter(s) Nebulizer every 4 hours  amLODIPine   Tablet 5 milliGRAM(s) Oral daily  buDESOnide 160 MICROgram(s)/formoterol 4.5 MICROgram(s) Inhaler 2 Puff(s) Inhalation two times a day  cefTRIAXone   IVPB      cefTRIAXone   IVPB 1 Gram(s) IV Intermittent every 24 hours  enalapril 5 milliGRAM(s) Oral daily  enoxaparin Injectable 40 milliGRAM(s) SubCutaneous daily  methylPREDNISolone sodium succinate Injectable 60 milliGRAM(s) IV Push every 8 hours  PARoxetine 10 milliGRAM(s) Oral daily  tiotropium 18 MICROgram(s) Capsule 1 Capsule(s) Inhalation daily    PRN MEDICATIONS  acetaminophen   Tablet .. 650 milliGRAM(s) Oral every 6 hours PRN  ALPRAZolam 0.25 milliGRAM(s) Oral two times a day PRN      VITAL SIGNS: Last 24 Hours  T(C): 35.8 (24 May 2019 05:41), Max: 36.3 (23 May 2019 09:15)  T(F): 96.4 (24 May 2019 05:41), Max: 97.4 (23 May 2019 09:15)  HR: 79 (24 May 2019 05:41) (65 - 100)  BP: 127/82 (24 May 2019 05:41) (109/72 - 184/130)  BP(mean): 82 (23 May 2019 21:11) (82 - 82)  RR: 18 (24 May 2019 05:41) (16 - 47)  SpO2: 95% (23 May 2019 22:20) (91% - 100%)    LABS:                        14.2   14.57 )-----------( 307      ( 23 May 2019 09:28 )             44.9         137  |  97<L>  |  11  ----------------------------<  149<H>  4.7   |  28  |  0.7    Ca    9.7      23 May 2019 09:28    TPro  7.1  /  Alb  4.4  /  TBili  0.5  /  DBili  x   /  AST  14  /  ALT  14  /  AlkPhos  98      PT/INR - ( 23 May 2019 09:28 )   PT: 11.00 sec;   INR: 0.96 ratio         PTT - ( 23 May 2019 09:28 )  PTT:29.2 sec  Urinalysis Basic - ( 23 May 2019 14:35 )    Color: Yellow / Appearance: Clear / S.015 / pH: x  Gluc: x / Ketone: Negative  / Bili: Negative / Urobili: 0.2 mg/dL   Blood: x / Protein: Trace mg/dL / Nitrite: Negative   Leuk Esterase: Negative / RBC: 3-5 /HPF / WBC x   Sq Epi: x / Non Sq Epi: Few /HPF / Bacteria: x    ABG - ( 23 May 2019 11:45 )  pH, Arterial: 7.32  pH, Blood: x     /  pCO2: 64    /  pO2: 103   / HCO3: 33    / Base Excess: 4.1   /  SaO2: 98        Troponin T, Serum: <0.01 ng/mL (19 @ 09:28)  CARDIAC MARKERS ( 23 May 2019 09:28 )  x     / <0.01 ng/mL / x     / x     / x          RADIOLOGY:      PHYSICAL EXAM:    GENERAL: NAD, elderly lady on 2-3L NC, no acute distress, AAOx3  HEENT:  Atraumatic, Normocephalic. EOMI, PERRLA, conjunctiva clear and sclera white, dry oral mucosa, No JVD  PULMONARY: decreased breath sounds throughout, very soft wheeze, no crackles, pt noticeably SOB after prolonged conversation  CARDIOVASCULAR: Regular rate and rhythm; No murmurs  GASTROINTESTINAL: Soft, Nontender, Nondistended; Bowel sounds present  EXT: No peripheral edema  NEUROLOGY: non-focal  SKIN: No rashes       ADMISSION SUMMARY  Patient is a 73y old Female who presents with a chief complaint of shortness of breath (23 May 2019 13:34)  Currently admitted to medicine with the primary diagnosis of COPD exacerbation  73 F with PMH of HTN/GERD/COPD on prn oxygen 2L, Pulm- DR. Robbins, comes in with worsening SOB since 1 day, last night she took nebs 3 times and her inhalers, and did not improve. She had worsening of her baseline sob 2 weeks back and she took prednisone taper 20 mg for 1 week and 10 mg for this week as prescribed by dr. robbins. she does have mild cough at baseline. Former smoker- quit 1 year ago. Denies fever/chills/ n/v/abd pain/diarrhoea/any worsening sputum.  She lives with son and sister lives upstairs. Walks w/o assistance although has a walker.     In ED- received solumedrol, alb nebs. Was placed on NIV 12/6 40%, now feels much better and was weaned off NIV during admission.     ASSESSMENT & PLAN    #COPD exacerbation, moderate  - no PNA on CXR, no fever/chills/leucocytosis  - c/w solumedrol 60 q8 iv  - c/w nebs q4   - c/w symbicort q12, spiriva q24 in hospital  - c/w rocephin 1gmiv q24, (copd exacerbation with age>65), levoquin allergic    #HTN- stable  - c/w enalapril, amlodipine } need to confirm doses    #Anxiety disorder  - takes xanax prn and was started on paxil by PMD as her anxiety was uncontrolled , took first dose yesterday    #GERD  - c/w pepcid    #MISC  - dvt ppx- lovenox 40 q24  - oob-chair  - dash diet  - Pharmacy: [facility], - medrec needs to be verified by pharmacy.       (x) Discussion with patient and/or family regarding goals of care  (x) Discussed Case and Plan with Medical Attending, Name: Farzaneh VENTURA MOCK 73y Female  MRN#: [id number]   CODE STATUS:___Full_____      SUBJECTIVE  Patient is a 73y old Female who presents with a chief complaint of shortness of breath (23 May 2019 13:34)  Currently admitted to medicine with the primary diagnosis of COPD exacerbation  Today is hospital day 1d, and this morning she is feeling improved, breathing near baseline and reports no overnight events.   Patient uses 2L NC PRN, walks independently at home. Lives w/ son but son works.    Pt denies active SOB, CP, abd pain. Does endorse urinary frequency.     OBJECTIVE  PAST MEDICAL & SURGICAL HISTORY  Anxiety  HTN (hypertension)  COPD (chronic obstructive pulmonary disease)  History of hysterectomy  History of cholecystectomy    ALLERGIES:  Levaquin (Other)    MEDICATIONS:  STANDING MEDICATIONS  ALBUTerol/ipratropium for Nebulization 3 milliLiter(s) Nebulizer every 4 hours  amLODIPine   Tablet 5 milliGRAM(s) Oral daily  buDESOnide 160 MICROgram(s)/formoterol 4.5 MICROgram(s) Inhaler 2 Puff(s) Inhalation two times a day  cefTRIAXone   IVPB      cefTRIAXone   IVPB 1 Gram(s) IV Intermittent every 24 hours  enalapril 5 milliGRAM(s) Oral daily  enoxaparin Injectable 40 milliGRAM(s) SubCutaneous daily  methylPREDNISolone sodium succinate Injectable 60 milliGRAM(s) IV Push every 8 hours  PARoxetine 10 milliGRAM(s) Oral daily  tiotropium 18 MICROgram(s) Capsule 1 Capsule(s) Inhalation daily    PRN MEDICATIONS  acetaminophen   Tablet .. 650 milliGRAM(s) Oral every 6 hours PRN  ALPRAZolam 0.25 milliGRAM(s) Oral two times a day PRN      VITAL SIGNS: Last 24 Hours  T(C): 35.8 (24 May 2019 05:41), Max: 36.3 (23 May 2019 09:15)  T(F): 96.4 (24 May 2019 05:41), Max: 97.4 (23 May 2019 09:15)  HR: 79 (24 May 2019 05:41) (65 - 100)  BP: 127/82 (24 May 2019 05:41) (109/72 - 184/130)  BP(mean): 82 (23 May 2019 21:11) (82 - 82)  RR: 18 (24 May 2019 05:41) (16 - 47)  SpO2: 95% (23 May 2019 22:20) (91% - 100%)    LABS:                        14.2   14.57 )-----------( 307      ( 23 May 2019 09:28 )             44.9         137  |  97<L>  |  11  ----------------------------<  149<H>  4.7   |  28  |  0.7    Ca    9.7      23 May 2019 09:28    TPro  7.1  /  Alb  4.4  /  TBili  0.5  /  DBili  x   /  AST  14  /  ALT  14  /  AlkPhos  98      PT/INR - ( 23 May 2019 09:28 )   PT: 11.00 sec;   INR: 0.96 ratio         PTT - ( 23 May 2019 09:28 )  PTT:29.2 sec  Urinalysis Basic - ( 23 May 2019 14:35 )    Color: Yellow / Appearance: Clear / S.015 / pH: x  Gluc: x / Ketone: Negative  / Bili: Negative / Urobili: 0.2 mg/dL   Blood: x / Protein: Trace mg/dL / Nitrite: Negative   Leuk Esterase: Negative / RBC: 3-5 /HPF / WBC x   Sq Epi: x / Non Sq Epi: Few /HPF / Bacteria: x    ABG - ( 23 May 2019 11:45 )  pH, Arterial: 7.32  pH, Blood: x     /  pCO2: 64    /  pO2: 103   / HCO3: 33    / Base Excess: 4.1   /  SaO2: 98        Troponin T, Serum: <0.01 ng/mL (19 @ 09:28)  CARDIAC MARKERS ( 23 May 2019 09:28 )  x     / <0.01 ng/mL / x     / x     / x          RADIOLOGY:      PHYSICAL EXAM:    GENERAL: NAD, elderly lady on 2-3L NC, no acute distress, AAOx3  HEENT:  Atraumatic, Normocephalic. EOMI, PERRLA, conjunctiva clear and sclera white, dry oral mucosa, No JVD  PULMONARY: decreased breath sounds throughout, very soft wheeze, no crackles, pt noticeably SOB after prolonged conversation  CARDIOVASCULAR: Regular rate and rhythm; No murmurs  GASTROINTESTINAL: Soft, Nontender, Nondistended; Bowel sounds present  EXT: No peripheral edema  NEUROLOGY: non-focal  SKIN: No rashes       ADMISSION SUMMARY  Patient is a 73y old Female who presents with a chief complaint of shortness of breath (23 May 2019 13:34)  Currently admitted to medicine with the primary diagnosis of COPD exacerbation  73 F with PMH of HTN/GERD/COPD on prn oxygen 2L, Pulm- DR. Robbins, comes in with worsening SOB since 1 day, last night she took nebs 3 times and her inhalers, and did not improve. She had worsening of her baseline sob 2 weeks back and she took prednisone taper 20 mg for 1 week and 10 mg for this week as prescribed by dr. robbins. she does have mild cough at baseline. Former smoker- quit 1 year ago. Denies fever/chills/ n/v/abd pain/diarrhoea/any worsening sputum.  She lives with son and sister lives upstairs. Walks w/o assistance although has a walker.     In ED- received solumedrol, alb nebs. Was placed on NIV 12/6 40%, now feels much better and was weaned off NIV during admission.     ASSESSMENT & PLAN    #COPD exacerbation, moderate  - no PNA on CXR, no fever/chills/leucocytosis  - c/w solumedrol 60 q8 iv  - c/w nebs q4   - c/w symbicort q12, spiriva q24 in hospital  - daily peak flow   - switch to azithro for 5d    #HTN- stable  - c/w enalapril, amlodipine } need to confirm doses    #Anxiety disorder  - takes xanax prn and was started on paxil by PMD as her anxiety was uncontrolled , took first dose yesterday    #GERD  - c/w pepcid    #MISC  - dvt ppx- lovenox 40 q24  - oob-chair  - dash diet  - Pharmacy: [facility], - medrec needs to be verified by pharmacy.       (x) Discussion with patient and/or family regarding goals of care  (x) Discussed Case and Plan with Medical Attending, Name: Farzaneh

## 2019-05-24 NOTE — DISCHARGE NOTE NURSING/CASE MANAGEMENT/SOCIAL WORK - NSDCPEEMAIL_GEN_ALL_CORE
[facility] for Tobacco Control email [email or web address]

## 2019-05-24 NOTE — DISCHARGE NOTE NURSING/CASE MANAGEMENT/SOCIAL WORK - NSDCPEWEB_GEN_ALL_CORE
[facility] for Tobacco Control website --- [email or web address] --- [email or web address]

## 2019-05-25 LAB
ANION GAP SERPL CALC-SCNC: 14 MMOL/L — SIGNIFICANT CHANGE UP (ref 7–14)
ANION GAP SERPL CALC-SCNC: 7 MMOL/L — SIGNIFICANT CHANGE UP (ref 7–14)
BUN SERPL-MCNC: 38 MG/DL — HIGH (ref 10–20)
BUN SERPL-MCNC: 42 MG/DL — HIGH (ref 10–20)
CALCIUM SERPL-MCNC: 8.4 MG/DL — LOW (ref 8.5–10.1)
CALCIUM SERPL-MCNC: 9.1 MG/DL — SIGNIFICANT CHANGE UP (ref 8.5–10.1)
CHLORIDE SERPL-SCNC: 102 MMOL/L — SIGNIFICANT CHANGE UP (ref 98–110)
CHLORIDE SERPL-SCNC: 99 MMOL/L — SIGNIFICANT CHANGE UP (ref 98–110)
CO2 SERPL-SCNC: 26 MMOL/L — SIGNIFICANT CHANGE UP (ref 17–32)
CO2 SERPL-SCNC: 27 MMOL/L — SIGNIFICANT CHANGE UP (ref 17–32)
CREAT SERPL-MCNC: 1.2 MG/DL — SIGNIFICANT CHANGE UP (ref 0.7–1.5)
CREAT SERPL-MCNC: 1.6 MG/DL — HIGH (ref 0.7–1.5)
GLUCOSE SERPL-MCNC: 117 MG/DL — HIGH (ref 70–99)
GLUCOSE SERPL-MCNC: 134 MG/DL — HIGH (ref 70–99)
HCT VFR BLD CALC: 39 % — SIGNIFICANT CHANGE UP (ref 37–47)
HCV AB S/CO SERPL IA: 0.05 S/CO — SIGNIFICANT CHANGE UP (ref 0–0.99)
HCV AB SERPL-IMP: SIGNIFICANT CHANGE UP
HGB BLD-MCNC: 12.3 G/DL — SIGNIFICANT CHANGE UP (ref 12–16)
MAGNESIUM SERPL-MCNC: 2.4 MG/DL — SIGNIFICANT CHANGE UP (ref 1.8–2.4)
MCHC RBC-ENTMCNC: 30.8 PG — SIGNIFICANT CHANGE UP (ref 27–31)
MCHC RBC-ENTMCNC: 31.5 G/DL — LOW (ref 32–37)
MCV RBC AUTO: 97.7 FL — SIGNIFICANT CHANGE UP (ref 81–99)
NRBC # BLD: 0 /100 WBCS — SIGNIFICANT CHANGE UP (ref 0–0)
PHOSPHATE SERPL-MCNC: 5.2 MG/DL — HIGH (ref 2.1–4.9)
PLATELET # BLD AUTO: 266 K/UL — SIGNIFICANT CHANGE UP (ref 130–400)
POTASSIUM SERPL-MCNC: 5.2 MMOL/L — HIGH (ref 3.5–5)
POTASSIUM SERPL-MCNC: 5.2 MMOL/L — HIGH (ref 3.5–5)
POTASSIUM SERPL-SCNC: 5.2 MMOL/L — HIGH (ref 3.5–5)
POTASSIUM SERPL-SCNC: 5.2 MMOL/L — HIGH (ref 3.5–5)
RBC # BLD: 3.99 M/UL — LOW (ref 4.2–5.4)
RBC # FLD: 13 % — SIGNIFICANT CHANGE UP (ref 11.5–14.5)
SODIUM SERPL-SCNC: 136 MMOL/L — SIGNIFICANT CHANGE UP (ref 135–146)
SODIUM SERPL-SCNC: 139 MMOL/L — SIGNIFICANT CHANGE UP (ref 135–146)
WBC # BLD: 22.02 K/UL — HIGH (ref 4.8–10.8)
WBC # FLD AUTO: 22.02 K/UL — HIGH (ref 4.8–10.8)

## 2019-05-25 RX ORDER — LANOLIN ALCOHOL/MO/W.PET/CERES
5 CREAM (GRAM) TOPICAL AT BEDTIME
Refills: 0 | Status: DISCONTINUED | OUTPATIENT
Start: 2019-05-25 | End: 2019-05-28

## 2019-05-25 RX ADMIN — AZITHROMYCIN 250 MILLIGRAM(S): 500 TABLET, FILM COATED ORAL at 12:53

## 2019-05-25 RX ADMIN — ENOXAPARIN SODIUM 40 MILLIGRAM(S): 100 INJECTION SUBCUTANEOUS at 12:54

## 2019-05-25 RX ADMIN — Medication 3 MILLILITER(S): at 00:19

## 2019-05-25 RX ADMIN — Medication 0.25 MILLIGRAM(S): at 05:32

## 2019-05-25 RX ADMIN — Medication 60 MILLIGRAM(S): at 21:27

## 2019-05-25 RX ADMIN — SODIUM CHLORIDE 100 MILLILITER(S): 9 INJECTION INTRAMUSCULAR; INTRAVENOUS; SUBCUTANEOUS at 17:26

## 2019-05-25 RX ADMIN — Medication 5 MILLIGRAM(S): at 22:16

## 2019-05-25 RX ADMIN — BUDESONIDE AND FORMOTEROL FUMARATE DIHYDRATE 2 PUFF(S): 160; 4.5 AEROSOL RESPIRATORY (INHALATION) at 07:50

## 2019-05-25 RX ADMIN — Medication 650 MILLIGRAM(S): at 14:47

## 2019-05-25 RX ADMIN — Medication 3 MILLILITER(S): at 07:58

## 2019-05-25 RX ADMIN — BUDESONIDE AND FORMOTEROL FUMARATE DIHYDRATE 2 PUFF(S): 160; 4.5 AEROSOL RESPIRATORY (INHALATION) at 21:26

## 2019-05-25 RX ADMIN — Medication 100 MILLIGRAM(S): at 14:45

## 2019-05-25 RX ADMIN — Medication 60 MILLIGRAM(S): at 05:32

## 2019-05-25 RX ADMIN — Medication 3 MILLILITER(S): at 19:47

## 2019-05-25 RX ADMIN — Medication 650 MILLIGRAM(S): at 14:50

## 2019-05-25 RX ADMIN — Medication 3 MILLILITER(S): at 16:01

## 2019-05-25 RX ADMIN — AMLODIPINE BESYLATE 5 MILLIGRAM(S): 2.5 TABLET ORAL at 05:32

## 2019-05-25 RX ADMIN — Medication 60 MILLIGRAM(S): at 13:03

## 2019-05-25 RX ADMIN — FAMOTIDINE 20 MILLIGRAM(S): 10 INJECTION INTRAVENOUS at 21:27

## 2019-05-25 RX ADMIN — SODIUM CHLORIDE 100 MILLILITER(S): 9 INJECTION INTRAMUSCULAR; INTRAVENOUS; SUBCUTANEOUS at 07:49

## 2019-05-25 RX ADMIN — Medication 3 MILLILITER(S): at 12:07

## 2019-05-25 RX ADMIN — Medication 10 MILLIGRAM(S): at 12:53

## 2019-05-25 NOTE — PROGRESS NOTE ADULT - ASSESSMENT
Patient is a 73y old Female who presents with a chief complaint of shortness of breath (23 May 2019 13:34)  Currently admitted to medicine with the primary diagnosis of COPD exacerbation  73 F with PMH of HTN/GERD/COPD on prn oxygen 2L, Pulm- DR. Zhang, comes in with worsening SOB since 1 day, last night she took nebs 3 times and her inhalers, and did not improve. She had worsening of her baseline sob 2 weeks back and she took prednisone taper 20 mg for 1 week and 10 mg for this week as prescribed by dr. zhang. she does have mild cough at baseline. Former smoker- quit 1 year ago. Denies fever/chills/ n/v/abd pain/diarrhoea/any worsening sputum.  She lives with son and sister lives upstairs. Walks w/o assistance although has a walker.     In ED- received solumedrol, alb nebs. Was placed on NIV 12/6 40%, now feels much better and was weaned off NIV during admission.     ASSESSMENT & PLAN    #COPD exacerbation, moderate  - no PNA on CXR, no fever/chills/leucocytosis  - currently on  solumedrol 60 q8 iv >> would taper down to q12  - c/w nebs q4, symbicort q12, spiriva while in hospital  - daily peak flow   - c/w PO azithro for 5d total (pt unable to tolerate IV)    # Leucocytosis > very likely from IV STEROID, doubt infectious. Trend CBC.     #HTN- stable  - c/w amlodipine   - hold enalapril given pt's AURELIANO    #AURELIANO with mild hyperkalemia   - baseline ~1.1, today 1.6  - c/w IVF NS @ 100, monitor @ 4pm   - hold enalapril and monitor    #Anxiety disorder  - takes xanax prn and was started on paxil by PMD as her anxiety was uncontrolled , took first dose yesterday  - can use an extra xanax 0.25 if needed     #GERD  - c/w pepcid    #MISC  - dvt ppx- lovenox 40 q24  - oob-chair  - dash diet  - Pharmacy: [facility], - medrec needs to be verified by pharmacy.     #Progress Note Handoff  Pending (specify):  Clinical improvement from COPD, AURELIANO  Plan d/w the patient.   Disposition: Home.

## 2019-05-25 NOTE — PROGRESS NOTE ADULT - SUBJECTIVE AND OBJECTIVE BOX
VENTURA MOCK 73y Female  MRN#: [id number]   CODE STATUS:___Full_____      SUBJECTIVE  Patient is a 73y old Female who presents with a chief complaint of shortness of breath (23 May 2019 13:34)  Currently admitted to medicine with the primary diagnosis of COPD exacerbation  Today is hospital day 2d, and this morning she is feeling stable breathing near baseline but not completely and reports no overnight events.   Yesterday had episode 'chest pain', did not seem to be cardiac in nature. May have been a panic attack which patient endorses having frequently.   EKG no acute changes.      OBJECTIVE  PAST MEDICAL & SURGICAL HISTORY  Anxiety  HTN (hypertension)  COPD (chronic obstructive pulmonary disease)  History of hysterectomy  History of cholecystectomy    ALLERGIES:  Levaquin (Other)    MEDICATIONS:  STANDING MEDICATIONS  ALBUTerol/ipratropium for Nebulization 3 milliLiter(s) Nebulizer every 4 hours  amLODIPine   Tablet 5 milliGRAM(s) Oral daily  azithromycin   Tablet 250 milliGRAM(s) Oral daily  buDESOnide 160 MICROgram(s)/formoterol 4.5 MICROgram(s) Inhaler 2 Puff(s) Inhalation two times a day  chlorhexidine 4% Liquid 1 Application(s) Topical <User Schedule>  enoxaparin Injectable 40 milliGRAM(s) SubCutaneous daily  famotidine    Tablet 20 milliGRAM(s) Oral at bedtime  methylPREDNISolone sodium succinate Injectable 60 milliGRAM(s) IV Push every 8 hours  PARoxetine 10 milliGRAM(s) Oral daily  sodium chloride 0.9%. 1000 milliLiter(s) IV Continuous <Continuous>    PRN MEDICATIONS  acetaminophen   Tablet .. 650 milliGRAM(s) Oral every 6 hours PRN  ALBUTerol/ipratropium for Nebulization 3 milliLiter(s) Nebulizer every 6 hours PRN  ALPRAZolam 0.25 milliGRAM(s) Oral two times a day PRN      VITAL SIGNS: Last 24 Hours  T(C): 35.6 (25 May 2019 05:35), Max: 36.4 (24 May 2019 13:25)  T(F): 96.1 (25 May 2019 05:35), Max: 97.6 (24 May 2019 13:25)  HR: 77 (25 May 2019 05:35) (77 - 90)  BP: 128/60 (25 May 2019 05:35) (112/58 - 128/60)  BP(mean): --  RR: 18 (25 May 2019 05:35) (18 - 18)  SpO2: 96% (24 May 2019 22:27) (92% - 96%)    LABS:                        12.3   22.02 )-----------( 266      ( 25 May 2019 05:57 )             39.0     05-25    139  |  99  |  42<H>  ----------------------------<  134<H>  5.2<H>   |  26  |  1.6<H>    Ca    9.1      25 May 2019 05:57  Phos  5.2     05-  Mg     2.4     -    TPro  7.1  /  Alb  4.4  /  TBili  0.5  /  DBili  x   /  AST  14  /  ALT  14  /  AlkPhos  98  05-23    PT/INR - ( 23 May 2019 09:28 )   PT: 11.00 sec;   INR: 0.96 ratio         PTT - ( 23 May 2019 09:28 )  PTT:29.2 sec  Urinalysis Basic - ( 23 May 2019 14:35 )    Color: Yellow / Appearance: Clear / S.015 / pH: x  Gluc: x / Ketone: Negative  / Bili: Negative / Urobili: 0.2 mg/dL   Blood: x / Protein: Trace mg/dL / Nitrite: Negative   Leuk Esterase: Negative / RBC: 3-5 /HPF / WBC x   Sq Epi: x / Non Sq Epi: Few /HPF / Bacteria: x      ABG - ( 23 May 2019 11:45 )  pH, Arterial: 7.32  pH, Blood: x     /  pCO2: 64    /  pO2: 103   / HCO3: 33    / Base Excess: 4.1   /  SaO2: 98                    CARDIAC MARKERS ( 23 May 2019 09:28 )  x     / <0.01 ng/mL / x     / x     / x          RADIOLOGY:      PHYSICAL EXAM:    GENERAL: NAD, elderly lady on 2-3L NC, no acute distress, AAOx3  HEENT:  Atraumatic, Normocephalic. EOMI, PERRLA, conjunctiva clear and sclera white, dry oral mucosa, No JVD  PULMONARY: decreased breath sounds throughout, very soft wheeze, no crackles, pt noticeably SOB after prolonged conversation  CARDIOVASCULAR: Regular rate and rhythm; No murmurs  GASTROINTESTINAL: Soft, Nontender, Nondistended; Bowel sounds present  EXT: No peripheral edema  NEUROLOGY: non-focal  SKIN: No rashes       ADMISSION SUMMARY  Patient is a 73y old Female who presents with a chief complaint of shortness of breath (23 May 2019 13:34)  Currently admitted to medicine with the primary diagnosis of COPD exacerbation  73 F with PMH of HTN/GERD/COPD on prn oxygen 2L, Pulm- DR. Robbins, comes in with worsening SOB since 1 day, last night she took nebs 3 times and her inhalers, and did not improve. She had worsening of her baseline sob 2 weeks back and she took prednisone taper 20 mg for 1 week and 10 mg for this week as prescribed by dr. robbins. she does have mild cough at baseline. Former smoker- quit 1 year ago. Denies fever/chills/ n/v/abd pain/diarrhoea/any worsening sputum.  She lives with son and sister lives upstairs. Walks w/o assistance although has a walker.     In ED- received solumedrol, alb nebs. Was placed on NIV 12/6 40%, now feels much better and was weaned off NIV during admission.     ASSESSMENT & PLAN    #COPD exacerbation, moderate  - no PNA on CXR, no fever/chills/leucocytosis  - c/w solumedrol 60 q8 iv  - c/w nebs q4, symbicort q12, spiriva while in hospital  - daily peak flow   - c/w PO azithro for 5d total (pt unable to tolerate IV)    #HTN- stable  - c/w amlodipine   - hold enalapril given pt's AURELIANO    #AURELIANO with mild hyperkalemia   - baseline ~1.1, today 1.6  - hold enalapril and monitor    #Anxiety disorder  - takes xanax prn and was started on paxil by PMD as her anxiety was uncontrolled , took first dose yesterday  - can use an extra xanax 0.25 if needed     #GERD  - c/w pepcid    #MISC  - dvt ppx- lovenox 40 q24  - oob-chair  - dash diet  - Pharmacy: [facility], - medrec needs to be verified by pharmacy. VENTURA MOCK 73y Female  MRN#: [id number]   CODE STATUS:___Full_____      SUBJECTIVE  Patient is a 73y old Female who presents with a chief complaint of shortness of breath (23 May 2019 13:34)  Currently admitted to medicine with the primary diagnosis of COPD exacerbation  Today is hospital day 2d, and this morning she is feeling stable breathing near baseline but not completely and reports no overnight events. Only complaint is having occasional coughing spasms.     Yesterday had episode 'chest pain', did not seem to be cardiac in nature. May have been a panic attack which patient endorses having frequently.   EKG no acute changes.      OBJECTIVE  PAST MEDICAL & SURGICAL HISTORY  Anxiety  HTN (hypertension)  COPD (chronic obstructive pulmonary disease)  History of hysterectomy  History of cholecystectomy    ALLERGIES:  Levaquin (Other)    MEDICATIONS:  STANDING MEDICATIONS  ALBUTerol/ipratropium for Nebulization 3 milliLiter(s) Nebulizer every 4 hours  amLODIPine   Tablet 5 milliGRAM(s) Oral daily  azithromycin   Tablet 250 milliGRAM(s) Oral daily  buDESOnide 160 MICROgram(s)/formoterol 4.5 MICROgram(s) Inhaler 2 Puff(s) Inhalation two times a day  chlorhexidine 4% Liquid 1 Application(s) Topical <User Schedule>  enoxaparin Injectable 40 milliGRAM(s) SubCutaneous daily  famotidine    Tablet 20 milliGRAM(s) Oral at bedtime  methylPREDNISolone sodium succinate Injectable 60 milliGRAM(s) IV Push every 8 hours  PARoxetine 10 milliGRAM(s) Oral daily  sodium chloride 0.9%. 1000 milliLiter(s) IV Continuous <Continuous>    PRN MEDICATIONS  acetaminophen   Tablet .. 650 milliGRAM(s) Oral every 6 hours PRN  ALBUTerol/ipratropium for Nebulization 3 milliLiter(s) Nebulizer every 6 hours PRN  ALPRAZolam 0.25 milliGRAM(s) Oral two times a day PRN      VITAL SIGNS: Last 24 Hours  T(C): 35.6 (25 May 2019 05:35), Max: 36.4 (24 May 2019 13:25)  T(F): 96.1 (25 May 2019 05:35), Max: 97.6 (24 May 2019 13:25)  HR: 77 (25 May 2019 05:35) (77 - 90)  BP: 128/60 (25 May 2019 05:35) (112/58 - 128/60)  BP(mean): --  RR: 18 (25 May 2019 05:35) (18 - 18)  SpO2: 96% (24 May 2019 22:27) (92% - 96%)    LABS:                        12.3   22.02 )-----------( 266      ( 25 May 2019 05:57 )             39.0     05-25    139  |  99  |  42<H>  ----------------------------<  134<H>  5.2<H>   |  26  |  1.6<H>    Ca    9.1      25 May 2019 05:57  Phos  5.2     05-  Mg     2.4     -    TPro  7.1  /  Alb  4.4  /  TBili  0.5  /  DBili  x   /  AST  14  /  ALT  14  /  AlkPhos  98  23    PT/INR - ( 23 May 2019 09:28 )   PT: 11.00 sec;   INR: 0.96 ratio         PTT - ( 23 May 2019 09:28 )  PTT:29.2 sec  Urinalysis Basic - ( 23 May 2019 14:35 )    Color: Yellow / Appearance: Clear / S.015 / pH: x  Gluc: x / Ketone: Negative  / Bili: Negative / Urobili: 0.2 mg/dL   Blood: x / Protein: Trace mg/dL / Nitrite: Negative   Leuk Esterase: Negative / RBC: 3-5 /HPF / WBC x   Sq Epi: x / Non Sq Epi: Few /HPF / Bacteria: x      ABG - ( 23 May 2019 11:45 )  pH, Arterial: 7.32  pH, Blood: x     /  pCO2: 64    /  pO2: 103   / HCO3: 33    / Base Excess: 4.1   /  SaO2: 98                    CARDIAC MARKERS ( 23 May 2019 09:28 )  x     / <0.01 ng/mL / x     / x     / x          RADIOLOGY:      PHYSICAL EXAM:    GENERAL: NAD, elderly lady on 2-3L NC, no acute distress, AAOx3, dry cough, eating comfortably   HEENT:  Atraumatic, Normocephalic. EOMI, PERRLA, conjunctiva clear and sclera white,No JVD  PULMONARY: decreased breath sounds throughout, no crackles or wheezing, able to talk in full sentences   CARDIOVASCULAR: Regular rate and rhythm; No murmurs  GASTROINTESTINAL: Soft, Nontender, Nondistended; Bowel sounds present  EXT: No peripheral edema  SKIN: No rashes       ADMISSION SUMMARY  Patient is a 73y old Female who presents with a chief complaint of shortness of breath (23 May 2019 13:34)  Currently admitted to medicine with the primary diagnosis of COPD exacerbation  73 F with PMH of HTN/GERD/COPD on prn oxygen 2L, Pulm- DR. Robbins, comes in with worsening SOB since 1 day, last night she took nebs 3 times and her inhalers, and did not improve. She had worsening of her baseline sob 2 weeks back and she took prednisone taper 20 mg for 1 week and 10 mg for this week as prescribed by dr. robbins. she does have mild cough at baseline. Former smoker- quit 1 year ago. Denies fever/chills/ n/v/abd pain/diarrhoea/any worsening sputum.  She lives with son and sister lives upstairs. Walks w/o assistance although has a walker.     In ED- received solumedrol, alb nebs. Was placed on NIV / 40%, now feels much better and was weaned off NIV during admission.     ASSESSMENT & PLAN    #COPD exacerbation, moderate  - no PNA on CXR, no fever/chills/leucocytosis  - c/w solumedrol 60 q8 iv  - c/w nebs q4, symbicort q12, spiriva while in hospital  - daily peak flow   - c/w PO azithro for 5d total (pt unable to tolerate IV)    #HTN- stable  - c/w amlodipine   - hold enalapril given pt's AURELIANO    #AURELIANO with mild hyperkalemia   - baseline ~1.1, today 1.6  - c/w IVF NS @ 100, monitor @ 4pm   - hold enalapril and monitor    #Anxiety disorder  - takes xanax prn and was started on paxil by PMD as her anxiety was uncontrolled , took first dose yesterday  - can use an extra xanax 0.25 if needed     #GERD  - c/w pepcid    #MISC  - dvt ppx- lovenox 40 q24  - oob-chair  - dash diet  - Pharmacy: [facility], - medrec needs to be verified by pharmacy.

## 2019-05-25 NOTE — PROGRESS NOTE ADULT - SUBJECTIVE AND OBJECTIVE BOX
VENTURA MOCK  73y  Female      Patient is a 73y old  Female who presents with a chief complaint of shortness of breath (25 May 2019 07:34)      INTERVAL HPI/OVERNIGHT EVENTS:      ******************************* REVIEW OF SYSTEMS:**********************************************  Breathing better, resting comfortably in chair.   All other review of systems negative    *********************** VITALS ******************************************    T(F): 96.1 (19 @ 05:35)  HR: 77 (19 @ 05:35) (77 - 90)  BP: 128/60 (19 @ 05:35) (112/58 - 128/60)  RR: 18 (19 @ 05:35) (18 - 18)  SpO2: 94% (19 @ 08:00) (94% - 96%)            ******************************** PHYSICAL EXAM:**************************************************  GENERAL: NAD    PSYCH: no agitation, baseline mentation  HEENT:     NERVOUS SYSTEM:  Alert & Oriented X3,    PULMONARY: DECREASED  [place], CTA    CARDIOVASCULAR: S1S2 RRR    GI: Soft, NT, ND; BS present.    EXTREMITIES:  2+ Peripheral Pulses, No clubbing, cyanosis, or edema    LYMPH: No lymphadenopathy noted    SKIN: No rashes or lesions    ******************************************************************************************    Consultant(s) Notes Reviewed:  [x ] YES  [ ] NO    Discussed with Consultants/Other Providers [ x] YES     **************************** LABS *******************************************************                          12.3   .02 )-----------( 266      ( 25 May 2019 05:57 )             39.0         139  |  99  |  42<H>  ----------------------------<  134<H>  5.2<H>   |  26  |  1.6<H>    Ca    9.1      25 May 2019 05:57  Phos  5.2       Mg     2.4           ABG - ( 23 May 2019 11:45 )  pH, Arterial: 7.32  pH, Blood: x     /  pCO2: 64    /  pO2: 103   / HCO3: 33    / Base Excess: 4.1   /  SaO2: 98                Urinalysis Basic - ( 23 May 2019 14:35 )    Color: Yellow / Appearance: Clear / S.015 / pH: x  Gluc: x / Ketone: Negative  / Bili: Negative / Urobili: 0.2 mg/dL   Blood: x / Protein: Trace mg/dL / Nitrite: Negative   Leuk Esterase: Negative / RBC: 3-5 /HPF / WBC x   Sq Epi: x / Non Sq Epi: Few /HPF / Bacteria: x        Lactate Trend        CAPILLARY BLOOD GLUCOSE              **************************Active Medications *******************************************  Levaquin (Other)      acetaminophen   Tablet .. 650 milliGRAM(s) Oral every 6 hours PRN  ALBUTerol/ipratropium for Nebulization 3 milliLiter(s) Nebulizer every 6 hours PRN  ALBUTerol/ipratropium for Nebulization 3 milliLiter(s) Nebulizer every 4 hours  ALPRAZolam 0.25 milliGRAM(s) Oral two times a day PRN  amLODIPine   Tablet 5 milliGRAM(s) Oral daily  azithromycin   Tablet 250 milliGRAM(s) Oral daily  buDESOnide 160 MICROgram(s)/formoterol 4.5 MICROgram(s) Inhaler 2 Puff(s) Inhalation two times a day  chlorhexidine 4% Liquid 1 Application(s) Topical <User Schedule>  enoxaparin Injectable 40 milliGRAM(s) SubCutaneous daily  famotidine    Tablet 20 milliGRAM(s) Oral at bedtime  guaiFENesin    Syrup 100 milliGRAM(s) Oral every 6 hours PRN  methylPREDNISolone sodium succinate Injectable 60 milliGRAM(s) IV Push every 8 hours  PARoxetine 10 milliGRAM(s) Oral daily  sodium chloride 0.9%. 1000 milliLiter(s) IV Continuous <Continuous>      ***************************************************  RADIOLOGY & ADDITIONAL TESTS:    Imaging Personally Reviewed:  [ ] YES  [ ] NO    HEALTH ISSUES - PROBLEM Dx:

## 2019-05-25 NOTE — CONSULT NOTE ADULT - SUBJECTIVE AND OBJECTIVE BOX
VENTURA MOCK  73y  Female  Patient seen and examined. Chart reviewed and events noted.  HPI: 73 F with PMH of HTN/GERD/COPD on prn oxygen 2L, Pulm- DR. Robbins, comes in with worsening SOB since 1 day, last night she took nebs 3 times and her inhalers, and did not improve. she had worsening of her baseline sob 2 weeks back and she took prednisone taper 20 mg for 1 week and 10 mg for this week as prescribed by dr. robbins. she does have mild cough at baseline. Ex smoker quit 1 year ago. Denies fever/chills/ n/v/abd pain/diarrhoea/any worsening sputum.   she lives with son and sister lives upstairs. walks w/o assistance although has a walker.   in ED- received solumedrol, alb nebs. was placed on NIV 12/6 40%, now feels much better and was weaned off NIV during admisison. (23 May 2019 13:34)    PAST MEDICAL & SURGICAL HISTORY:  Anxiety  HTN (hypertension)  COPD (chronic obstructive pulmonary disease) Severe - on home O2  GERD with Hiatal hernia  History of hysterectomy  History of cholecystectomy    Allergies Levaquin (Other)    FAMILY HISTORY:  Family history of congestive heart failure  Family history of COPD (chronic obstructive pulmonary disease): In father    SOCIAL HISTORY  Smoking History: Ex smoker  Alcohol: denied  Drugs: denied  Occupation:  Retired  MEDICATIONS  (STANDING):  ALBUTerol/ipratropium for Nebulization 3 milliLiter(s) Nebulizer every 4 hours  amLODIPine   Tablet 5 milliGRAM(s) Oral daily  azithromycin   Tablet 250 milliGRAM(s) Oral daily  buDESOnide 160 MICROgram(s)/formoterol 4.5 MICROgram(s) Inhaler 2 Puff(s) Inhalation two times a day  chlorhexidine 4% Liquid 1 Application(s) Topical <User Schedule>  enoxaparin Injectable 40 milliGRAM(s) SubCutaneous daily  famotidine    Tablet 20 milliGRAM(s) Oral at bedtime  methylPREDNISolone sodium succinate Injectable 60 milliGRAM(s) IV Push every 8 hours  PARoxetine 10 milliGRAM(s) Oral daily  sodium chloride 0.9%. 1000 milliLiter(s) (100 mL/Hr) IV Continuous <Continuous>  MEDICATIONS  (PRN):  acetaminophen   Tablet .. 650 milliGRAM(s) Oral every 6 hours PRN Mild Pain (1 - 3)  ALBUTerol/ipratropium for Nebulization 3 milliLiter(s) Nebulizer every 6 hours PRN Shortness of Breath and/or Wheezing  ALPRAZolam 0.25 milliGRAM(s) Oral two times a day PRN anxiety  guaiFENesin    Syrup 100 milliGRAM(s) Oral every 6 hours PRN Cough      REVIEW OF SYSTEMS:  CONSTITUTIONAL: No fevers or chills. Chronic anxiety   HEENT: No visual disturbance or sore throat. Nasal congestion   NECK: No pain or stiffness  RESPIRATORY: SOB/MARS, cough and occasional sputum  CARDIOVASCULAR: No chest pain or palpitations  GASTROINTESTINAL:  No nausea, vomiting, or diarrhea. No abdominal pain.  GENITOURINARY: No dysuria, frequency or hematuria  NEUROLOGICAL: No numbness or headache  EXTREMITIES: No edema  No calf pain or tenderness    Vital Signs Last 24 Hrs  T(F): 96.8 (25 May 2019 13:00), Max: 97 (24 May 2019 22:27)  HR: 86 (25 May 2019 12:05) (77 - 86)  BP: 138/66 (25 May 2019 12:05) (115/54 - 138/66)  RR: 18 (25 May 2019 12:05) (18 - 18)  SpO2: 94% (25 May 2019 08:00) (94% - 96%) on  O2    PHYSICAL EXAM:  Constitutional: A A O x 3 NAD Freely speaking. O2 in use. OOB to chair  HEAD: PERRLA, No JVD, Atraumatic, Normocephalic  Neck: No neck pain  Respiratory: Decreased BS bilaterally   Cardiovascular: regular rate and rhythm  Gastrointestinal: Soft NT +BS  Extremities: No E/C/C  No calf pain or tenderness. Movement in all four extremities  Skin: No lesions    LABS:                        12.3   22.02 )-----------( 266      ( 25 May 2019 05:57 ) On Steroids              39.0     -    139  |  99  |  42<H>  ----------------------------<  134<H>  5.2<H>   |  26  |  1.6<H>    Ca    9.1      25 May 2019 05:57  Phos  5.2     05-25  Mg     2.4     25    Urinalysis Basic - ( 23 May 2019 14:35 )    Color: Yellow / Appearance: Clear / S.015 / pH: x  Gluc: x / Ketone: Negative  / Bili: Negative / Urobili: 0.2 mg/dL   Blood: x / Protein: Trace mg/dL / Nitrite: Negative   Leuk Esterase: Negative / RBC: 3-5 /HPF / WBC x   Sq Epi: x / Non Sq Epi: Few /HPF / Bacteria: x    Serum Pro-Brain Natriuretic Peptide: 137 pg/mL (19 @ 09:28)    RADIOLOGY:  Chest X-Ray: NAD    ASSESSMENT:  Chronic SOB & hypoxia  O2 dependant  Severe COPD H/O tobacco use  Allergic Rhinitis  Anxiety  GERD with Hiatal hernia  Dehydration     PLAN:  O2 at 2 liters. Keep Sat at 92%  Bronchodilator Rx  Change PO Prednisone 60mg with 10 day antoinette  GI  / DVT prophylaxis   PO fluids  Anxiety control   Nasal saline wash  Out patient follow up  THANK YOU

## 2019-05-26 LAB
ANION GAP SERPL CALC-SCNC: 11 MMOL/L — SIGNIFICANT CHANGE UP (ref 7–14)
ANION GAP SERPL CALC-SCNC: 13 MMOL/L — SIGNIFICANT CHANGE UP (ref 7–14)
BASOPHILS # BLD AUTO: 0.01 K/UL — SIGNIFICANT CHANGE UP (ref 0–0.2)
BASOPHILS NFR BLD AUTO: 0.1 % — SIGNIFICANT CHANGE UP (ref 0–1)
BUN SERPL-MCNC: 31 MG/DL — HIGH (ref 10–20)
BUN SERPL-MCNC: 31 MG/DL — HIGH (ref 10–20)
CALCIUM SERPL-MCNC: 8.7 MG/DL — SIGNIFICANT CHANGE UP (ref 8.5–10.1)
CALCIUM SERPL-MCNC: 8.9 MG/DL — SIGNIFICANT CHANGE UP (ref 8.5–10.1)
CHLORIDE SERPL-SCNC: 104 MMOL/L — SIGNIFICANT CHANGE UP (ref 98–110)
CHLORIDE SERPL-SCNC: 105 MMOL/L — SIGNIFICANT CHANGE UP (ref 98–110)
CO2 SERPL-SCNC: 28 MMOL/L — SIGNIFICANT CHANGE UP (ref 17–32)
CO2 SERPL-SCNC: 28 MMOL/L — SIGNIFICANT CHANGE UP (ref 17–32)
CREAT SERPL-MCNC: 1 MG/DL — SIGNIFICANT CHANGE UP (ref 0.7–1.5)
CREAT SERPL-MCNC: 1.1 MG/DL — SIGNIFICANT CHANGE UP (ref 0.7–1.5)
EOSINOPHIL # BLD AUTO: 0 K/UL — SIGNIFICANT CHANGE UP (ref 0–0.7)
EOSINOPHIL NFR BLD AUTO: 0 % — SIGNIFICANT CHANGE UP (ref 0–8)
GLUCOSE BLDC GLUCOMTR-MCNC: 124 MG/DL — HIGH (ref 70–99)
GLUCOSE BLDC GLUCOMTR-MCNC: 164 MG/DL — HIGH (ref 70–99)
GLUCOSE SERPL-MCNC: 108 MG/DL — HIGH (ref 70–99)
GLUCOSE SERPL-MCNC: 132 MG/DL — HIGH (ref 70–99)
HCT VFR BLD CALC: 39.1 % — SIGNIFICANT CHANGE UP (ref 37–47)
HGB BLD-MCNC: 12.2 G/DL — SIGNIFICANT CHANGE UP (ref 12–16)
IMM GRANULOCYTES NFR BLD AUTO: 1.1 % — HIGH (ref 0.1–0.3)
LYMPHOCYTES # BLD AUTO: 0.49 K/UL — LOW (ref 1.2–3.4)
LYMPHOCYTES # BLD AUTO: 2.6 % — LOW (ref 20.5–51.1)
MCHC RBC-ENTMCNC: 30.7 PG — SIGNIFICANT CHANGE UP (ref 27–31)
MCHC RBC-ENTMCNC: 31.2 G/DL — LOW (ref 32–37)
MCV RBC AUTO: 98.5 FL — SIGNIFICANT CHANGE UP (ref 81–99)
MONOCYTES # BLD AUTO: 0.8 K/UL — HIGH (ref 0.1–0.6)
MONOCYTES NFR BLD AUTO: 4.3 % — SIGNIFICANT CHANGE UP (ref 1.7–9.3)
NEUTROPHILS # BLD AUTO: 17.08 K/UL — HIGH (ref 1.4–6.5)
NEUTROPHILS NFR BLD AUTO: 91.9 % — HIGH (ref 42.2–75.2)
NRBC # BLD: 0 /100 WBCS — SIGNIFICANT CHANGE UP (ref 0–0)
PLATELET # BLD AUTO: 242 K/UL — SIGNIFICANT CHANGE UP (ref 130–400)
POTASSIUM SERPL-MCNC: 4.9 MMOL/L — SIGNIFICANT CHANGE UP (ref 3.5–5)
POTASSIUM SERPL-MCNC: 6 MMOL/L — CRITICAL HIGH (ref 3.5–5)
POTASSIUM SERPL-SCNC: 4.9 MMOL/L — SIGNIFICANT CHANGE UP (ref 3.5–5)
POTASSIUM SERPL-SCNC: 6 MMOL/L — CRITICAL HIGH (ref 3.5–5)
RBC # BLD: 3.97 M/UL — LOW (ref 4.2–5.4)
RBC # FLD: 13.1 % — SIGNIFICANT CHANGE UP (ref 11.5–14.5)
SODIUM SERPL-SCNC: 144 MMOL/L — SIGNIFICANT CHANGE UP (ref 135–146)
SODIUM SERPL-SCNC: 145 MMOL/L — SIGNIFICANT CHANGE UP (ref 135–146)
WBC # BLD: 18.58 K/UL — HIGH (ref 4.8–10.8)
WBC # FLD AUTO: 18.58 K/UL — HIGH (ref 4.8–10.8)

## 2019-05-26 PROCEDURE — 93010 ELECTROCARDIOGRAM REPORT: CPT

## 2019-05-26 RX ORDER — INSULIN HUMAN 100 [IU]/ML
10 INJECTION, SOLUTION SUBCUTANEOUS ONCE
Refills: 0 | Status: COMPLETED | OUTPATIENT
Start: 2019-05-26 | End: 2019-05-26

## 2019-05-26 RX ORDER — SODIUM POLYSTYRENE SULFONATE 4.1 MEQ/G
15 POWDER, FOR SUSPENSION ORAL ONCE
Refills: 0 | Status: COMPLETED | OUTPATIENT
Start: 2019-05-26 | End: 2019-05-26

## 2019-05-26 RX ORDER — ALBUTEROL 90 UG/1
2 AEROSOL, METERED ORAL EVERY 6 HOURS
Refills: 0 | Status: DISCONTINUED | OUTPATIENT
Start: 2019-05-26 | End: 2019-05-28

## 2019-05-26 RX ORDER — DEXTROSE 50 % IN WATER 50 %
50 SYRINGE (ML) INTRAVENOUS ONCE
Refills: 0 | Status: COMPLETED | OUTPATIENT
Start: 2019-05-26 | End: 2019-05-26

## 2019-05-26 RX ADMIN — ALBUTEROL 2 PUFF(S): 90 AEROSOL, METERED ORAL at 18:44

## 2019-05-26 RX ADMIN — BUDESONIDE AND FORMOTEROL FUMARATE DIHYDRATE 2 PUFF(S): 160; 4.5 AEROSOL RESPIRATORY (INHALATION) at 09:05

## 2019-05-26 RX ADMIN — Medication 0.25 MILLIGRAM(S): at 21:30

## 2019-05-26 RX ADMIN — Medication 3 MILLILITER(S): at 07:49

## 2019-05-26 RX ADMIN — Medication 650 MILLIGRAM(S): at 15:30

## 2019-05-26 RX ADMIN — AMLODIPINE BESYLATE 5 MILLIGRAM(S): 2.5 TABLET ORAL at 05:10

## 2019-05-26 RX ADMIN — BUDESONIDE AND FORMOTEROL FUMARATE DIHYDRATE 2 PUFF(S): 160; 4.5 AEROSOL RESPIRATORY (INHALATION) at 21:31

## 2019-05-26 RX ADMIN — Medication 650 MILLIGRAM(S): at 09:06

## 2019-05-26 RX ADMIN — SODIUM POLYSTYRENE SULFONATE 15 GRAM(S): 4.1 POWDER, FOR SUSPENSION ORAL at 10:59

## 2019-05-26 RX ADMIN — Medication 60 MILLIGRAM(S): at 05:10

## 2019-05-26 RX ADMIN — Medication 650 MILLIGRAM(S): at 09:10

## 2019-05-26 RX ADMIN — AZITHROMYCIN 250 MILLIGRAM(S): 500 TABLET, FILM COATED ORAL at 11:10

## 2019-05-26 RX ADMIN — Medication 10 MILLIGRAM(S): at 11:10

## 2019-05-26 RX ADMIN — Medication 40 MILLIGRAM(S): at 17:33

## 2019-05-26 RX ADMIN — Medication 50 MILLILITER(S): at 10:58

## 2019-05-26 RX ADMIN — FAMOTIDINE 20 MILLIGRAM(S): 10 INJECTION INTRAVENOUS at 21:30

## 2019-05-26 RX ADMIN — ENOXAPARIN SODIUM 40 MILLIGRAM(S): 100 INJECTION SUBCUTANEOUS at 11:10

## 2019-05-26 RX ADMIN — Medication 0.25 MILLIGRAM(S): at 09:05

## 2019-05-26 RX ADMIN — Medication 650 MILLIGRAM(S): at 15:27

## 2019-05-26 RX ADMIN — CHLORHEXIDINE GLUCONATE 1 APPLICATION(S): 213 SOLUTION TOPICAL at 05:10

## 2019-05-26 RX ADMIN — SODIUM CHLORIDE 100 MILLILITER(S): 9 INJECTION INTRAMUSCULAR; INTRAVENOUS; SUBCUTANEOUS at 05:11

## 2019-05-26 RX ADMIN — Medication 3 MILLILITER(S): at 16:06

## 2019-05-26 RX ADMIN — SODIUM CHLORIDE 100 MILLILITER(S): 9 INJECTION INTRAMUSCULAR; INTRAVENOUS; SUBCUTANEOUS at 17:33

## 2019-05-26 RX ADMIN — Medication 3 MILLILITER(S): at 19:59

## 2019-05-26 RX ADMIN — Medication 650 MILLIGRAM(S): at 02:11

## 2019-05-26 NOTE — PROGRESS NOTE ADULT - ASSESSMENT
Patient is a 73y old Female who presents with a chief complaint of shortness of breath (23 May 2019 13:34)  Currently admitted to medicine with the primary diagnosis of COPD exacerbation  73 F with PMH of HTN/GERD/COPD on prn oxygen 2L, Pulm- DR. Zhang, comes in with worsening SOB since 1 day, last night she took nebs 3 times and her inhalers, and did not improve. She had worsening of her baseline sob 2 weeks back and she took prednisone taper 20 mg for 1 week and 10 mg for this week as prescribed by dr. zhang. she does have mild cough at baseline. Former smoker- quit 1 year ago. Denies fever/chills/ n/v/abd pain/diarrhoea/any worsening sputum.  She lives with son and sister lives upstairs. Walks w/o assistance although has a walker.     In ED- received solumedrol, alb nebs. Was placed on NIV 12/6 40%, now feels much better and was weaned off NIV during admission.     ASSESSMENT & PLAN    #COPD exacerbation, moderate  - no PNA on CXR, no fever/chills/leucocytosis  gingerly downtitrating IV steroid, check ambulatory po2  - c/w nebs q4, symbicort q12, spiriva while in hospital  - daily peak flow   - c/w PO azithro for 5d total (pt unable to tolerate IV)    # Leucocytosis > very likely from IV STEROID, doubt infectious. Trend CBC.     #HTN- stable  - c/w amlodipine   - hold enalapril given pt's AURELIANO    #AURELIANO with hyperkalemia   aureliano no resolved but hyperkalemia still present today  - hold enalapril and monitor  hyperkalemia cocktail and kayexelate today, repeat bmp this afternoon  no palpitations  ekg unchanged from prior, no acute injury pattern     #Anxiety disorder  - takes xanax prn and was started on paxil by PMD as her anxiety was uncontrolled , took first dose yesterday  - can use an extra xanax 0.25 if needed     #GERD  - c/w pepcid    #MISC  - dvt ppx- lovenox 40 q24  - oob-chair  - dash diet  - Pharmacy: [facility], - medrec needs to be verified by pharmacy.     #Progress Note Handoff  Pending (specify):  Clinical improvement from COPD, hyperkalemia  Plan d/w the patient.   Disposition: Home.

## 2019-05-26 NOTE — CHART NOTE - NSCHARTNOTEFT_GEN_A_CORE
Potassium was 6.0 in the morning. Patient was asymptomatic. EKG shows no peaked T waves and no other acute changes compared to the previous ones. 15mg of Kayexalate, 1 Amp of dextrose 50% and 10u of IV regular insulin were ordered. Will repeat BMP at 4PM.

## 2019-05-26 NOTE — PROGRESS NOTE ADULT - SUBJECTIVE AND OBJECTIVE BOX
VENTURA MOCK  73y  Female      Patient is a 73y old  Female who presents with a chief complaint of shortness of breath (25 May 2019 14:18)      INTERVAL HPI/OVERNIGHT EVENTS: breathing is a bit better but patient still having anxiety/ panic. very sob upon ambulation. denies palpitations       REVIEW OF SYSTEMS:  as above  All other review of systems negative    T(C): 36.2 (05-26-19 @ 12:05), Max: 36.2 (05-26-19 @ 12:05)  HR: 73 (05-26-19 @ 12:05) (73 - 91)  BP: 163/73 (05-26-19 @ 12:05) (131/65 - 163/73)  RR: 18 (05-26-19 @ 12:05) (18 - 18)  SpO2: 94% (05-26-19 @ 04:52) (94% - 94%)  Wt(kg): --Vital Signs Last 24 Hrs  T(C): 36.2 (26 May 2019 12:05), Max: 36.2 (26 May 2019 12:05)  T(F): 97.1 (26 May 2019 12:05), Max: 97.1 (26 May 2019 12:05)  HR: 73 (26 May 2019 12:05) (73 - 91)  BP: 163/73 (26 May 2019 12:05) (131/65 - 163/73)  BP(mean): --  RR: 18 (26 May 2019 12:05) (18 - 18)  SpO2: 94% (26 May 2019 04:52) (94% - 94%)      05-25-19 @ 07:01  -  05-26-19 @ 07:00  --------------------------------------------------------  IN: 1000 mL / OUT: 0 mL / NET: 1000 mL        PHYSICAL EXAM:  GENERAL: NAD  PSYCH: no agitation, baseline mentation, anxious but consolable  NERVOUS SYSTEM:  Alert & Oriented X3, no new focal deficits  PULMONARY: coarse with very subtle end expiratory wheeze, a bit less tight than prior, comfortable on NC  CARDIOVASCULAR: Regular rate and rhythm; No murmurs, rubs, or gallops  GI: Soft, Nontender, Nondistended; Bowel sounds present   no cvat  EXTREMITIES:  2+ Peripheral Pulses, No clubbing, cyanosis, or edema    Consultant(s) Notes Reviewed:  [x ] YES  [ ] NO    Discussed with Consultants/Other Providers [ x] YES     LABS                          12.2   18.58 )-----------( 242      ( 26 May 2019 07:02 )             39.1     05-26    144  |  105  |  31<H>  ----------------------------<  132<H>  6.0<HH>   |  28  |  1.1    Ca    8.9      26 May 2019 07:02  Phos  5.2     05-25  Mg     2.4     05-25            Lactate Trend        CAPILLARY BLOOD GLUCOSE      POCT Blood Glucose.: 164 mg/dL (26 May 2019 13:11)        RADIOLOGY & ADDITIONAL TESTS:    Imaging Personally Reviewed:  [ ] YES  [ ] NO    HEALTH ISSUES - PROBLEM Dx:

## 2019-05-27 LAB
ANION GAP SERPL CALC-SCNC: 9 MMOL/L — SIGNIFICANT CHANGE UP (ref 7–14)
BASOPHILS # BLD AUTO: 0.01 K/UL — SIGNIFICANT CHANGE UP (ref 0–0.2)
BASOPHILS NFR BLD AUTO: 0.1 % — SIGNIFICANT CHANGE UP (ref 0–1)
BUN SERPL-MCNC: 29 MG/DL — HIGH (ref 10–20)
CALCIUM SERPL-MCNC: 9 MG/DL — SIGNIFICANT CHANGE UP (ref 8.5–10.1)
CHLORIDE SERPL-SCNC: 104 MMOL/L — SIGNIFICANT CHANGE UP (ref 98–110)
CO2 SERPL-SCNC: 32 MMOL/L — SIGNIFICANT CHANGE UP (ref 17–32)
CREAT SERPL-MCNC: 1 MG/DL — SIGNIFICANT CHANGE UP (ref 0.7–1.5)
EOSINOPHIL # BLD AUTO: 0 K/UL — SIGNIFICANT CHANGE UP (ref 0–0.7)
EOSINOPHIL NFR BLD AUTO: 0 % — SIGNIFICANT CHANGE UP (ref 0–8)
GLUCOSE SERPL-MCNC: 110 MG/DL — HIGH (ref 70–99)
HCT VFR BLD CALC: 40.5 % — SIGNIFICANT CHANGE UP (ref 37–47)
HGB BLD-MCNC: 12.5 G/DL — SIGNIFICANT CHANGE UP (ref 12–16)
IMM GRANULOCYTES NFR BLD AUTO: 0.9 % — HIGH (ref 0.1–0.3)
LYMPHOCYTES # BLD AUTO: 0.61 K/UL — LOW (ref 1.2–3.4)
LYMPHOCYTES # BLD AUTO: 4.6 % — LOW (ref 20.5–51.1)
MCHC RBC-ENTMCNC: 30.9 G/DL — LOW (ref 32–37)
MCHC RBC-ENTMCNC: 30.9 PG — SIGNIFICANT CHANGE UP (ref 27–31)
MCV RBC AUTO: 100.2 FL — HIGH (ref 81–99)
MONOCYTES # BLD AUTO: 1.28 K/UL — HIGH (ref 0.1–0.6)
MONOCYTES NFR BLD AUTO: 9.7 % — HIGH (ref 1.7–9.3)
NEUTROPHILS # BLD AUTO: 11.21 K/UL — HIGH (ref 1.4–6.5)
NEUTROPHILS NFR BLD AUTO: 84.7 % — HIGH (ref 42.2–75.2)
NRBC # BLD: 0 /100 WBCS — SIGNIFICANT CHANGE UP (ref 0–0)
PLATELET # BLD AUTO: 227 K/UL — SIGNIFICANT CHANGE UP (ref 130–400)
POTASSIUM SERPL-MCNC: 4.9 MMOL/L — SIGNIFICANT CHANGE UP (ref 3.5–5)
POTASSIUM SERPL-SCNC: 4.9 MMOL/L — SIGNIFICANT CHANGE UP (ref 3.5–5)
RBC # BLD: 4.04 M/UL — LOW (ref 4.2–5.4)
RBC # FLD: 13.1 % — SIGNIFICANT CHANGE UP (ref 11.5–14.5)
SODIUM SERPL-SCNC: 145 MMOL/L — SIGNIFICANT CHANGE UP (ref 135–146)
WBC # BLD: 13.23 K/UL — HIGH (ref 4.8–10.8)
WBC # FLD AUTO: 13.23 K/UL — HIGH (ref 4.8–10.8)

## 2019-05-27 RX ORDER — AMLODIPINE BESYLATE 2.5 MG/1
5 TABLET ORAL ONCE
Refills: 0 | Status: COMPLETED | OUTPATIENT
Start: 2019-05-27 | End: 2019-05-27

## 2019-05-27 RX ORDER — AMLODIPINE BESYLATE 2.5 MG/1
10 TABLET ORAL DAILY
Refills: 0 | Status: DISCONTINUED | OUTPATIENT
Start: 2019-05-28 | End: 2019-05-28

## 2019-05-27 RX ADMIN — BUDESONIDE AND FORMOTEROL FUMARATE DIHYDRATE 2 PUFF(S): 160; 4.5 AEROSOL RESPIRATORY (INHALATION) at 08:07

## 2019-05-27 RX ADMIN — AZITHROMYCIN 250 MILLIGRAM(S): 500 TABLET, FILM COATED ORAL at 11:36

## 2019-05-27 RX ADMIN — Medication 10 MILLIGRAM(S): at 11:37

## 2019-05-27 RX ADMIN — Medication 0.25 MILLIGRAM(S): at 21:15

## 2019-05-27 RX ADMIN — CHLORHEXIDINE GLUCONATE 1 APPLICATION(S): 213 SOLUTION TOPICAL at 05:18

## 2019-05-27 RX ADMIN — FAMOTIDINE 20 MILLIGRAM(S): 10 INJECTION INTRAVENOUS at 21:15

## 2019-05-27 RX ADMIN — AMLODIPINE BESYLATE 5 MILLIGRAM(S): 2.5 TABLET ORAL at 11:37

## 2019-05-27 RX ADMIN — Medication 3 MILLILITER(S): at 19:45

## 2019-05-27 RX ADMIN — Medication 40 MILLIGRAM(S): at 05:18

## 2019-05-27 RX ADMIN — Medication 3 MILLILITER(S): at 15:50

## 2019-05-27 RX ADMIN — AMLODIPINE BESYLATE 5 MILLIGRAM(S): 2.5 TABLET ORAL at 05:18

## 2019-05-27 RX ADMIN — BUDESONIDE AND FORMOTEROL FUMARATE DIHYDRATE 2 PUFF(S): 160; 4.5 AEROSOL RESPIRATORY (INHALATION) at 21:15

## 2019-05-27 RX ADMIN — Medication 0.25 MILLIGRAM(S): at 08:01

## 2019-05-27 RX ADMIN — ENOXAPARIN SODIUM 40 MILLIGRAM(S): 100 INJECTION SUBCUTANEOUS at 11:37

## 2019-05-27 NOTE — PROGRESS NOTE ADULT - SUBJECTIVE AND OBJECTIVE BOX
VENTURA MOCK  73y  Female      Patient is a 73y old  Female who presents with a chief complaint of shortness of breath (27 May 2019 07:37)      INTERVAL HPI/OVERNIGHT EVENTS: breathing is baseline. still having peguero. no palpitations. urinating       REVIEW OF SYSTEMS:  as above  All other review of systems negative    T(C): 36.6 (05-27-19 @ 12:30), Max: 36.6 (05-27-19 @ 12:30)  HR: 77 (05-27-19 @ 12:30) (74 - 109)  BP: 162/92 (05-27-19 @ 12:30) (159/83 - 194/109)  RR: 18 (05-27-19 @ 12:30) (18 - 18)  SpO2: 94% (05-27-19 @ 07:45) (94% - 94%)  Wt(kg): --Vital Signs Last 24 Hrs  T(C): 36.6 (27 May 2019 12:30), Max: 36.6 (27 May 2019 12:30)  T(F): 97.9 (27 May 2019 12:30), Max: 97.9 (27 May 2019 12:30)  HR: 77 (27 May 2019 12:30) (74 - 109)  BP: 162/92 (27 May 2019 12:30) (159/83 - 194/109)  BP(mean): --  RR: 18 (27 May 2019 12:30) (18 - 18)  SpO2: 94% (27 May 2019 07:45) (94% - 94%)        PHYSICAL EXAM:  GENERAL: NAD  PSYCH: no agitation, baseline mentation, anxious but consolable  NERVOUS SYSTEM:  Alert & Oriented X3, no new focal deficits  PULMONARY: kyphotic with chronic emphysematous changes and poor exchange, but no focal wheezing, and comfortable appearing on NC  CARDIOVASCULAR: Regular rate and rhythm; No murmurs, rubs, or gallops  GI: Soft, Nontender, Nondistended; Bowel sounds present  EXTREMITIES:  2+ Peripheral Pulses, No clubbing, cyanosis, or edema    Consultant(s) Notes Reviewed:  [x ] YES  [ ] NO    Discussed with Consultants/Other Providers [ x] YES     LABS                          12.5   13.23 )-----------( 227      ( 27 May 2019 06:28 )             40.5     05-27    145  |  104  |  29<H>  ----------------------------<  110<H>  4.9   |  32  |  1.0    Ca    9.0      27 May 2019 06:28            Lactate Trend        CAPILLARY BLOOD GLUCOSE      POCT Blood Glucose.: 164 mg/dL (26 May 2019 13:11)        RADIOLOGY & ADDITIONAL TESTS:    Imaging Personally Reviewed:  [ ] YES  [ ] NO    HEALTH ISSUES - PROBLEM Dx:

## 2019-05-27 NOTE — PHYSICAL THERAPY INITIAL EVALUATION ADULT - LIVES WITH, PROFILE
Pt reports she lives at home with her son. Sister lives upstairs. Pt has O2 and RW at home. No steps to enter and no steps in living space as per pt.

## 2019-05-27 NOTE — PROGRESS NOTE ADULT - SUBJECTIVE AND OBJECTIVE BOX
VENTURA MOCK 73y Female  MRN#: [id number]   CODE STATUS:___Full_____      SUBJECTIVE  Patient is a 73y old Female who presents with a chief complaint of shortness of breath (23 May 2019 13:34)  Currently admitted to medicine with the primary diagnosis of COPD exacerbation  Today is hospital day 4d, and this morning she is feeling stable in terms of breathing.   Over weekend, potassium noted to be high w/o EKG change      OBJECTIVE  PAST MEDICAL & SURGICAL HISTORY  Anxiety  HTN (hypertension)  COPD (chronic obstructive pulmonary disease)  History of hysterectomy  History of cholecystectomy    ALLERGIES:  Levaquin (Other)    MEDICATIONS:  STANDING MEDICATIONS  ALBUTerol/ipratropium for Nebulization 3 milliLiter(s) Nebulizer every 4 hours  amLODIPine   Tablet 5 milliGRAM(s) Oral daily  azithromycin   Tablet 250 milliGRAM(s) Oral daily  buDESOnide 160 MICROgram(s)/formoterol 4.5 MICROgram(s) Inhaler 2 Puff(s) Inhalation two times a day  chlorhexidine 4% Liquid 1 Application(s) Topical <User Schedule>  enoxaparin Injectable 40 milliGRAM(s) SubCutaneous daily  famotidine    Tablet 20 milliGRAM(s) Oral at bedtime  methylPREDNISolone sodium succinate Injectable 40 milliGRAM(s) IV Push every 12 hours  PARoxetine 10 milliGRAM(s) Oral daily  sodium chloride 0.9%. 1000 milliLiter(s) IV Continuous <Continuous>    PRN MEDICATIONS  acetaminophen   Tablet .. 650 milliGRAM(s) Oral every 6 hours PRN  ALBUTerol    90 MICROgram(s) HFA Inhaler 2 Puff(s) Inhalation every 6 hours PRN  ALBUTerol/ipratropium for Nebulization 3 milliLiter(s) Nebulizer every 6 hours PRN  ALPRAZolam 0.25 milliGRAM(s) Oral two times a day PRN  guaiFENesin    Syrup 100 milliGRAM(s) Oral every 6 hours PRN  melatonin 5 milliGRAM(s) Oral at bedtime PRN      VITAL SIGNS: Last 24 Hours  T(C): 36.1 (27 May 2019 05:44), Max: 36.4 (26 May 2019 20:44)  T(F): 96.9 (27 May 2019 05:44), Max: 97.5 (26 May 2019 20:44)  HR: 74 (27 May 2019 06:56) (73 - 109)  BP: 171/86 (27 May 2019 06:56) (159/83 - 194/109)  BP(mean): --  RR: 18 (27 May 2019 05:44) (18 - 18)  SpO2: --    LABS:                        12.5   13.23 )-----------( 227      ( 27 May 2019 06:28 )             40.5     05-27    145  |  104  |  29<H>  ----------------------------<  110<H>  4.9   |  32  |  1.0    Ca    9.0      27 May 2019 06:28                    RADIOLOGY:      PHYSICAL EXAM:    GENERAL: NAD, elderly lady on 2-3L NC, no acute distress, AAOx3, dry cough, eating comfortably   HEENT:  Atraumatic, Normocephalic. EOMI, PERRLA, conjunctiva clear and sclera white,No JVD  PULMONARY: decreased breath sounds throughout, no crackles or wheezing, able to talk in full sentences   CARDIOVASCULAR: Regular rate and rhythm; No murmurs  GASTROINTESTINAL: Soft, Nontender, Nondistended; Bowel sounds present  EXT: No peripheral edema  SKIN: No rashes       ADMISSION SUMMARY  Patient is a 73y old Female who presents with a chief complaint of shortness of breath (23 May 2019 13:34)  Currently admitted to medicine with the primary diagnosis of COPD exacerbation  73 F with PMH of HTN/GERD/COPD on prn oxygen 2L, Pulm- DR. Robbins, comes in with worsening SOB since 1 day, last night she took nebs 3 times and her inhalers, and did not improve. She had worsening of her baseline sob 2 weeks back and she took prednisone taper 20 mg for 1 week and 10 mg for this week as prescribed by dr. robbins. she does have mild cough at baseline. Former smoker- quit 1 year ago. Denies fever/chills/ n/v/abd pain/diarrhoea/any worsening sputum.  She lives with son and sister lives upstairs. Walks w/o assistance although has a walker.     In ED- received solumedrol, alb nebs. Was placed on NIV 12/6 40%, now feels much better and was weaned off NIV during admission.     ASSESSMENT & PLAN    #COPD exacerbation, moderate  - no PNA on CXR, no fever/chills/leucocytosis  - weaning solumedrol to 40 qdaily today (pred taper tmw)  - Pulm recs appreciated  - c/w nebs q4, symbicort q12, spiriva while in hospital  - daily peak flow   - c/w PO azithro for 5d total (dc tmw)    #HTN- stable  - c/w amlodipine   - hold enalapril given pt's AURELIANO    #AURELIANO with mild hyperkalemia , resolved   - back to baseline ~1.1, potassium now 4.9  - considered d/c'ing IVF  - hold enalapril and monitor    #Anxiety disorder  - takes xanax prn and was started on paxil by PMD as her anxiety was uncontrolled , took first dose yesterday  - can use an extra xanax 0.25 if needed     #GERD  - c/w pepcid    #MISC  - dvt ppx- lovenox 40 q24  - oob-chair  - dash diet  - Pharmacy: [facility], - medrec needs to be verified by pharmacy. VENTURA MOCK 73y Female  MRN#: [id number]   CODE STATUS:___Full_____      SUBJECTIVE  Patient is a 73y old Female who presents with a chief complaint of shortness of breath (23 May 2019 13:34)  Currently admitted to medicine with the primary diagnosis of COPD exacerbation  Today is hospital day 4d, and this morning she is feeling stable in terms of breathing.   Over weekend, potassium noted to be high w/o EKG change. This AM BP noted to be elevated, asymptomatic. Patient anxious but controlled w/ ativan.      OBJECTIVE  PAST MEDICAL & SURGICAL HISTORY  Anxiety  HTN (hypertension)  COPD (chronic obstructive pulmonary disease)  History of hysterectomy  History of cholecystectomy    ALLERGIES:  Levaquin (Other)    MEDICATIONS:  STANDING MEDICATIONS  ALBUTerol/ipratropium for Nebulization 3 milliLiter(s) Nebulizer every 4 hours  amLODIPine   Tablet 5 milliGRAM(s) Oral daily  azithromycin   Tablet 250 milliGRAM(s) Oral daily  buDESOnide 160 MICROgram(s)/formoterol 4.5 MICROgram(s) Inhaler 2 Puff(s) Inhalation two times a day  chlorhexidine 4% Liquid 1 Application(s) Topical <User Schedule>  enoxaparin Injectable 40 milliGRAM(s) SubCutaneous daily  famotidine    Tablet 20 milliGRAM(s) Oral at bedtime  methylPREDNISolone sodium succinate Injectable 40 milliGRAM(s) IV Push every 12 hours  PARoxetine 10 milliGRAM(s) Oral daily  sodium chloride 0.9%. 1000 milliLiter(s) IV Continuous <Continuous>    PRN MEDICATIONS  acetaminophen   Tablet .. 650 milliGRAM(s) Oral every 6 hours PRN  ALBUTerol    90 MICROgram(s) HFA Inhaler 2 Puff(s) Inhalation every 6 hours PRN  ALBUTerol/ipratropium for Nebulization 3 milliLiter(s) Nebulizer every 6 hours PRN  ALPRAZolam 0.25 milliGRAM(s) Oral two times a day PRN  guaiFENesin    Syrup 100 milliGRAM(s) Oral every 6 hours PRN  melatonin 5 milliGRAM(s) Oral at bedtime PRN      VITAL SIGNS: Last 24 Hours  T(C): 36.1 (27 May 2019 05:44), Max: 36.4 (26 May 2019 20:44)  T(F): 96.9 (27 May 2019 05:44), Max: 97.5 (26 May 2019 20:44)  HR: 74 (27 May 2019 06:56) (73 - 109)  BP: 171/86 (27 May 2019 06:56) (159/83 - 194/109)  BP(mean): --  RR: 18 (27 May 2019 05:44) (18 - 18)  SpO2: --    LABS:                        12.5   13.23 )-----------( 227      ( 27 May 2019 06:28 )             40.5     05-27    145  |  104  |  29<H>  ----------------------------<  110<H>  4.9   |  32  |  1.0    Ca    9.0      27 May 2019 06:28                    RADIOLOGY:      PHYSICAL EXAM:    GENERAL: elderly lady on 2-3L NC, no acute distress but seems mildly anxious, AAOx3, dry cough, eating comfortably   HEENT:  Atraumatic, Normocephalic. EOMI, PERRLA  PULMONARY: still decreased breath sounds throughout, able to talk in full sentences   CARDIOVASCULAR: Regular rate (mildly tachy ~102-108) regular rhythm; No murmurs  GASTROINTESTINAL: Soft, Nontender, Nondistended; Bowel sounds present  EXT: No peripheral edema  SKIN: No rashes       ADMISSION SUMMARY  Patient is a 73y old Female who presents with a chief complaint of shortness of breath (23 May 2019 13:34)  Currently admitted to medicine with the primary diagnosis of COPD exacerbation  73 F with PMH of HTN/GERD/COPD on prn oxygen 2L, Pulm- DR. Robbins, comes in with worsening SOB since 1 day, last night she took nebs 3 times and her inhalers, and did not improve. She had worsening of her baseline sob 2 weeks back and she took prednisone taper 20 mg for 1 week and 10 mg for this week as prescribed by dr. robbins. she does have mild cough at baseline. Former smoker- quit 1 year ago. Denies fever/chills/ n/v/abd pain/diarrhoea/any worsening sputum.  She lives with son and sister lives upstairs. Walks w/o assistance although has a walker.     In ED- received solumedrol, alb nebs. Was placed on NIV 12/6 40%, now feels much better and was weaned off NIV during admission.     ASSESSMENT & PLAN    #COPD exacerbation, moderate  - no PNA on CXR, no fever/chills/leucocytosis  - weaning solumedrol to 40 qdaily today (pred taper tmw)  - Pulm recs appreciated  - c/w nebs q4, symbicort q12, spiriva while in hospital  - daily peak flow   - c/w PO azithro for 5d total (dc tmw)    #HTN- uncontrolled today  - inc amlodipine   - consider restarting enalapril as AURELIANO improving (but hyperkalemia over weekend)    #AURELIANO with mild hyperkalemia , resolved   - back to baseline ~1.1, potassium now 4.9  - dc IVF    #Anxiety disorder  - takes xanax prn and was started on paxil by PMD as her anxiety was uncontrolled , took first dose yesterday  - can use an extra xanax 0.25 if needed     #GERD  - c/w pepcid    #MISC  - dvt ppx- lovenox 40 q24  - oob-chair  - dash diet  - Pharmacy: [facility] [id number] [place]

## 2019-05-27 NOTE — PHYSICAL THERAPY INITIAL EVALUATION ADULT - ADDITIONAL COMMENTS
Pt verbally reports she was independent with ambulation prior to admission but does have a RW at home.

## 2019-05-27 NOTE — PROGRESS NOTE ADULT - ASSESSMENT
Patient is a 73y old Female who presents with a chief complaint of shortness of breath (23 May 2019 13:34)  Currently admitted to medicine with the primary diagnosis of COPD exacerbation  73 F with PMH of HTN/GERD/COPD on prn oxygen 2L, Pulm- DR. Zhang, comes in with worsening SOB since 1 day, last night she took nebs 3 times and her inhalers, and did not improve. She had worsening of her baseline sob 2 weeks back and she took prednisone taper 20 mg for 1 week and 10 mg for this week as prescribed by dr. zhang. she does have mild cough at baseline. Former smoker- quit 1 year ago. Denies fever/chills/ n/v/abd pain/diarrhoea/any worsening sputum.  She lives with son and sister lives upstairs. Walks w/o assistance although has a walker.     In ED- received solumedrol, alb nebs. Was placed on NIV 12/6 40%, now feels much better and was weaned off NIV during admission.     ASSESSMENT & PLAN    #COPD exacerbation, moderate  - no PNA on CXR, no fever/chills/leucocytosis  titrate to PO steroid- slow taper, check ambulatory po2- patient has O2, she will need to use it while ambulating  - c/w nebs q4, symbicort q12, spiriva while in hospital  - daily peak flow   - c/w PO azithro for 5d total (pt unable to tolerate IV)    # Leucocytosis > very likely from IV STEROID, doubt infectious. Trend CBC.     #HTN-  ace inhibitor held for hyperkalemia with subsequent hypertension  went up on amlodipine today- which patient tolerated well  will discharge patient home on 10mg po q daily of amlodipine while holding ace inhibitor for recurrent hyperkalemia    #AURELIANO with hyperkalemia   aureliano no resolved and hyperkalemia is better as well  - hold enalapril and monitor    #Anxiety disorder  - takes xanax prn and was started on paxil by PMD as her anxiety was uncontrolled , took first dose yesterday  - can use an extra xanax 0.25 if needed     #GERD  - c/w pepcid    #MISC  - dvt ppx- lovenox 40 q24  - oob-chair  - dash diet  - Pharmacy: [facility], - medrec needs to be verified by pharmacy.     #Progress Note Handoff  Pending (specify):  monitor bp after change, check ambulatory po2, anticipate for discharge home for tomorrow  Plan d/w the patient.   Disposition: Home tomorrow

## 2019-05-27 NOTE — PHYSICAL THERAPY INITIAL EVALUATION ADULT - CRITERIA FOR SKILLED THERAPEUTIC INTERVENTIONS
impairments found/Pt with good safety awareness. Pt physical ability limited 2/2 shortness of breath, not limited by strength deficits

## 2019-05-27 NOTE — PHYSICAL THERAPY INITIAL EVALUATION ADULT - IMPAIRMENTS FOUND, PT EVAL
Pt with good safety awareness. Pt physical ability limited 2/2 shortness of breath, not limited by strength deficits./aerobic capacity/endurance

## 2019-05-28 VITALS
DIASTOLIC BLOOD PRESSURE: 82 MMHG | SYSTOLIC BLOOD PRESSURE: 141 MMHG | HEART RATE: 85 BPM | RESPIRATION RATE: 18 BRPM | TEMPERATURE: 98 F

## 2019-05-28 LAB
ANION GAP SERPL CALC-SCNC: 7 MMOL/L — SIGNIFICANT CHANGE UP (ref 7–14)
BASOPHILS # BLD AUTO: 0.01 K/UL — SIGNIFICANT CHANGE UP (ref 0–0.2)
BASOPHILS NFR BLD AUTO: 0.1 % — SIGNIFICANT CHANGE UP (ref 0–1)
BUN SERPL-MCNC: 24 MG/DL — HIGH (ref 10–20)
CALCIUM SERPL-MCNC: 9.4 MG/DL — SIGNIFICANT CHANGE UP (ref 8.5–10.1)
CHLORIDE SERPL-SCNC: 100 MMOL/L — SIGNIFICANT CHANGE UP (ref 98–110)
CO2 SERPL-SCNC: 38 MMOL/L — HIGH (ref 17–32)
CREAT SERPL-MCNC: 1 MG/DL — SIGNIFICANT CHANGE UP (ref 0.7–1.5)
EOSINOPHIL # BLD AUTO: 0.07 K/UL — SIGNIFICANT CHANGE UP (ref 0–0.7)
EOSINOPHIL NFR BLD AUTO: 0.6 % — SIGNIFICANT CHANGE UP (ref 0–8)
GLUCOSE SERPL-MCNC: 95 MG/DL — SIGNIFICANT CHANGE UP (ref 70–99)
HCT VFR BLD CALC: 42 % — SIGNIFICANT CHANGE UP (ref 37–47)
HGB BLD-MCNC: 12.9 G/DL — SIGNIFICANT CHANGE UP (ref 12–16)
IMM GRANULOCYTES NFR BLD AUTO: 0.7 % — HIGH (ref 0.1–0.3)
LYMPHOCYTES # BLD AUTO: 0.84 K/UL — LOW (ref 1.2–3.4)
LYMPHOCYTES # BLD AUTO: 6.7 % — LOW (ref 20.5–51.1)
MAGNESIUM SERPL-MCNC: 2.2 MG/DL — SIGNIFICANT CHANGE UP (ref 1.8–2.4)
MCHC RBC-ENTMCNC: 30.4 PG — SIGNIFICANT CHANGE UP (ref 27–31)
MCHC RBC-ENTMCNC: 30.7 G/DL — LOW (ref 32–37)
MCV RBC AUTO: 99.1 FL — HIGH (ref 81–99)
MONOCYTES # BLD AUTO: 1.35 K/UL — HIGH (ref 0.1–0.6)
MONOCYTES NFR BLD AUTO: 10.8 % — HIGH (ref 1.7–9.3)
NEUTROPHILS # BLD AUTO: 10.15 K/UL — HIGH (ref 1.4–6.5)
NEUTROPHILS NFR BLD AUTO: 81.1 % — HIGH (ref 42.2–75.2)
NRBC # BLD: 0 /100 WBCS — SIGNIFICANT CHANGE UP (ref 0–0)
PLATELET # BLD AUTO: 215 K/UL — SIGNIFICANT CHANGE UP (ref 130–400)
POTASSIUM SERPL-MCNC: 5.1 MMOL/L — HIGH (ref 3.5–5)
POTASSIUM SERPL-SCNC: 5.1 MMOL/L — HIGH (ref 3.5–5)
RBC # BLD: 4.24 M/UL — SIGNIFICANT CHANGE UP (ref 4.2–5.4)
RBC # FLD: 13.1 % — SIGNIFICANT CHANGE UP (ref 11.5–14.5)
SODIUM SERPL-SCNC: 145 MMOL/L — SIGNIFICANT CHANGE UP (ref 135–146)
WBC # BLD: 12.51 K/UL — HIGH (ref 4.8–10.8)
WBC # FLD AUTO: 12.51 K/UL — HIGH (ref 4.8–10.8)

## 2019-05-28 RX ORDER — ALPRAZOLAM 0.25 MG
1 TABLET ORAL
Qty: 0 | Refills: 0 | DISCHARGE
Start: 2019-05-28

## 2019-05-28 RX ORDER — AMLODIPINE BESYLATE 2.5 MG/1
1 TABLET ORAL
Qty: 30 | Refills: 0
Start: 2019-05-28 | End: 2019-06-26

## 2019-05-28 RX ORDER — UMECLIDINIUM 62.5 UG/1
1 AEROSOL, POWDER ORAL
Qty: 0 | Refills: 0 | DISCHARGE

## 2019-05-28 RX ORDER — SODIUM POLYSTYRENE SULFONATE 4.1 MEQ/G
15 POWDER, FOR SUSPENSION ORAL
Qty: 150 | Refills: 0
Start: 2019-05-28

## 2019-05-28 RX ORDER — AMLODIPINE BESYLATE 2.5 MG/1
1 TABLET ORAL
Qty: 0 | Refills: 0 | DISCHARGE

## 2019-05-28 RX ORDER — ALBUTEROL 90 UG/1
2 AEROSOL, METERED ORAL
Qty: 0 | Refills: 0 | DISCHARGE

## 2019-05-28 RX ORDER — SODIUM POLYSTYRENE SULFONATE 4.1 MEQ/G
15 POWDER, FOR SUSPENSION ORAL
Refills: 0 | Status: DISCONTINUED | OUTPATIENT
Start: 2019-05-28 | End: 2019-05-28

## 2019-05-28 RX ORDER — ALBUTEROL 90 UG/1
3 AEROSOL, METERED ORAL
Qty: 0 | Refills: 0 | DISCHARGE

## 2019-05-28 RX ADMIN — Medication 60 MILLIGRAM(S): at 05:15

## 2019-05-28 RX ADMIN — ENOXAPARIN SODIUM 40 MILLIGRAM(S): 100 INJECTION SUBCUTANEOUS at 11:13

## 2019-05-28 RX ADMIN — BUDESONIDE AND FORMOTEROL FUMARATE DIHYDRATE 2 PUFF(S): 160; 4.5 AEROSOL RESPIRATORY (INHALATION) at 07:41

## 2019-05-28 RX ADMIN — Medication 10 MILLIGRAM(S): at 11:13

## 2019-05-28 RX ADMIN — CHLORHEXIDINE GLUCONATE 1 APPLICATION(S): 213 SOLUTION TOPICAL at 05:16

## 2019-05-28 RX ADMIN — AZITHROMYCIN 250 MILLIGRAM(S): 500 TABLET, FILM COATED ORAL at 11:13

## 2019-05-28 RX ADMIN — Medication 3 MILLILITER(S): at 06:32

## 2019-05-28 RX ADMIN — AMLODIPINE BESYLATE 10 MILLIGRAM(S): 2.5 TABLET ORAL at 05:15

## 2019-05-28 RX ADMIN — Medication 0.25 MILLIGRAM(S): at 10:51

## 2019-05-28 NOTE — PROGRESS NOTE ADULT - ASSESSMENT
Patient is a 73y old Female who presents with a chief complaint of shortness of breath (23 May 2019 13:34)  Currently admitted to medicine with the primary diagnosis of COPD exacerbation  73 F with PMH of HTN/GERD/COPD on prn oxygen 2L, Pulm- DR. Zhang, comes in with worsening SOB since 1 day, last night she took nebs 3 times and her inhalers, and did not improve. She had worsening of her baseline sob 2 weeks back and she took prednisone taper 20 mg for 1 week and 10 mg for this week as prescribed by dr. zhang. she does have mild cough at baseline. Former smoker- quit 1 year ago. Denies fever/chills/ n/v/abd pain/diarrhoea/any worsening sputum.  She lives with son and sister lives upstairs. Walks w/o assistance although has a walker.     In ED- received solumedrol, alb nebs. Was placed on NIV 12/6 40%, now feels much better and was weaned off NIV during admission.     ASSESSMENT & PLAN    #COPD exacerbation, moderate  - no PNA on CXR, no fever/chills/leucocytosis  titrate to PO steroid- slow taper, check ambulatory po2- patient has O2, she will need to use it while ambulating  - c/w nebs q4, symbicort q12, spiriva while in hospital  - daily peak flow   - complete PO azithro for 5d total (pt unable to tolerate IV)    # Leucocytosis > very likely from IV STEROID, improved    #HTN-  ace inhibitor held for hyperkalemia with subsequent hypertension  went up on amlodipine today- which patient tolerated well  will discharge patient home on 10mg po q daily of amlodipine while holding ace inhibitor for recurrent hyperkalemia    #AURELIANO with hyperkalemia   aureliano no resolved and hyperkalemia is better as well  - hold enalapril and monitor  kayexelate 15mg po every 3rd day, outpatient bmp with pmd in 1 week    #Anxiety disorder  - takes xanax prn and was started on paxil by PMD    #GERD  - c/w pepcid    #MISC  - dvt ppx- lovenox 40 q24  - oob-chair  - dash diet  - Pharmacy: [facility], - medrec needs to be verified by pharmacy.     patient medically stable for discharge home today. close outpatient pmd/ pulm f/u's

## 2019-05-28 NOTE — DISCHARGE NOTE PROVIDER - NSDCCPCAREPLAN_GEN_ALL_CORE_FT
PRINCIPAL DISCHARGE DIAGNOSIS  Diagnosis: COPD exacerbation  Assessment and Plan of Treatment: You have history of lung disease on oxygen as needed at home. Found to be in exacerbation - treated with oxygen, antibiotic, IV Steroids, and inhalers and clinically improved back to baseline  - Please follow up with Dr. Robbins within 1 week of discharge  - Use supplemental oxygen to maintain SaO2 >88% at home. Discuss need for constant O2 with Dr. Robbins  - continue using all inhalers as directed  - finish prednisone taper (60mg for 3 days, then 40mg for 3 days, then 20mg for 3 days then stop)      SECONDARY DISCHARGE DIAGNOSES  Diagnosis: Anxiety  Assessment and Plan of Treatment: Follow up with primary provider. Continue using alprazolam and paxil as directed.    Diagnosis: Hypertension  Assessment and Plan of Treatment: You have history of high blood pressure. We are holding enalapril given your kidney and high potassium which has now improved.  - Hold enalapril until you see Dr. Franks  - Please take amlodpine 10mg for blood pressure control instead until otherwise directed PRINCIPAL DISCHARGE DIAGNOSIS  Diagnosis: COPD exacerbation  Assessment and Plan of Treatment: You have history of lung disease on oxygen as needed at home. Found to be in exacerbation - treated with oxygen, antibiotic, IV Steroids, and inhalers and clinically improved back to baseline  - Please follow up with Dr. Robbins within 1 week of discharge  - Use supplemental oxygen to maintain SaO2 >88% at home. Discuss need for constant O2 with Dr. Robbins  - continue using all inhalers as directed  - finish prednisone taper (60mg for 3 days, then 40mg for 3 days, then 20mg for 3 days then stop)      SECONDARY DISCHARGE DIAGNOSES  Diagnosis: Hyperkalemia  Assessment and Plan of Treatment: While in hopsital your potassium was intermittently noted to be high  - Please do take enalapil   - take kayexalate to help lower your potassium every 3 days  - In 7 days you should make an appointment to see your primay care provider and get a repeat BMP blood work   - Please avoid high potassium foods such as bananas    Diagnosis: Anxiety  Assessment and Plan of Treatment: Follow up with primary provider. Continue using alprazolam and paxil as directed.    Diagnosis: Hypertension  Assessment and Plan of Treatment: You have history of high blood pressure. We are holding enalapril given your kidney and high potassium which has now improved.  - Hold enalapril until you see Dr. Franks  - Please take amlodpine 10mg for blood pressure control instead until otherwise directed PRINCIPAL DISCHARGE DIAGNOSIS  Diagnosis: COPD exacerbation  Assessment and Plan of Treatment: You have history of lung disease on oxygen as needed at home. Found to be in exacerbation - treated with oxygen, antibiotic, IV Steroids, and inhalers and clinically improved back to baseline  - Please follow up with Dr. Robbins within 1 week of discharge  - Use supplemental oxygen to maintain SaO2 >88% at home. Discuss need for constant O2 with Dr. Robbins as well as inhalers.  - continue using all inhalers as directed  - finish prednisone taper (60mg for 3 days, then 40mg for 3 days, then 20mg for 3 days then stop)      SECONDARY DISCHARGE DIAGNOSES  Diagnosis: Hyperkalemia  Assessment and Plan of Treatment: While in hopsital your potassium was intermittently noted to be high  - Please do take enalapil   - take kayexalate to help lower your potassium every 3 days  - In 7 days you should make an appointment to see your primay care provider and get a repeat BMP blood work   - Please avoid high potassium foods such as bananas    Diagnosis: Anxiety  Assessment and Plan of Treatment: Follow up with primary provider. Continue using alprazolam and paxil as directed.    Diagnosis: Hypertension  Assessment and Plan of Treatment: You have history of high blood pressure. We are holding enalapril given your kidney and high potassium which has now improved.  - Hold enalapril until you see Dr. Franks  - Please take amlodpine 10mg for blood pressure control instead until otherwise directed    Diagnosis: Acute on chronic respiratory failure with hypoxia  Assessment and Plan of Treatment:     Diagnosis: Acute on chronic respiratory failure with hypoxia  Assessment and Plan of Treatment:     Diagnosis: Acute on chronic respiratory failure with hypoxemia  Assessment and Plan of Treatment: PRINCIPAL DISCHARGE DIAGNOSIS  Diagnosis: COPD exacerbation  Assessment and Plan of Treatment: You have history of lung disease on oxygen as needed at home. Found to be in exacerbation - treated with oxygen, antibiotic, IV Steroids, and inhalers and clinically improved back to baseline  - Please follow up with Dr. Robbins within 1 week of discharge  - Use supplemental oxygen to maintain SaO2 >88% at home. Discuss need for constant O2 with Dr. Robbins as well as inhalers.  - continue using all inhalers as directed  - finish prednisone taper (60mg for 3 days, then 40mg for 3 days, then 20mg for 3 days then stop)      SECONDARY DISCHARGE DIAGNOSES  Diagnosis: Acute on chronic respiratory failure with hypoxia  Assessment and Plan of Treatment: Continue with inhalers and discuss other options such as symbicort with Dr. Robbins. Use oxygen continuously at home for 7 days as directed.    Diagnosis: Hyperkalemia  Assessment and Plan of Treatment: While in hopsital your potassium was intermittently noted to be high  - Please do take enalapil   - take kayexalate to help lower your potassium every 3 days  - In 7 days you should make an appointment to see your primay care provider and get a repeat BMP blood work   - Please avoid high potassium foods such as bananas    Diagnosis: Anxiety  Assessment and Plan of Treatment: Probable generalized anxiety disorder with breakthrough panic attacks.   - Follow up with primary provider.   - Continue using alprazolam and paxil as directed.    Diagnosis: Hypertension  Assessment and Plan of Treatment: You have history of high blood pressure. We are holding enalapril given your kidney and high potassium which has now improved.  - Hold enalapril until you see Dr. Franks  - Please take amlodpine 10mg for blood pressure control instead until otherwise directed

## 2019-05-28 NOTE — PROGRESS NOTE ADULT - SUBJECTIVE AND OBJECTIVE BOX
VENTURA MOCK 73y Female  MRN#: [id number]   CODE STATUS:___Full_____      SUBJECTIVE  Patient is a 73y old Female who presents with a chief complaint of shortness of breath (23 May 2019 13:34)  Currently admitted to medicine with the primary diagnosis of COPD exacerbation  Today is hospital day 5d, and this morning she is feeling stable in terms of breathing. Still having panic attacks as per pt  Yesterday BP was highly elevated, amlodipine increased and today somewhat improved.         OBJECTIVE  PAST MEDICAL & SURGICAL HISTORY  Anxiety  HTN (hypertension)  COPD (chronic obstructive pulmonary disease)  History of hysterectomy  History of cholecystectomy    ALLERGIES:  Levaquin (Other)    MEDICATIONS:  STANDING MEDICATIONS  ALBUTerol/ipratropium for Nebulization 3 milliLiter(s) Nebulizer every 4 hours  amLODIPine   Tablet 10 milliGRAM(s) Oral daily  azithromycin   Tablet 250 milliGRAM(s) Oral daily  buDESOnide 160 MICROgram(s)/formoterol 4.5 MICROgram(s) Inhaler 2 Puff(s) Inhalation two times a day  chlorhexidine 4% Liquid 1 Application(s) Topical <User Schedule>  enoxaparin Injectable 40 milliGRAM(s) SubCutaneous daily  famotidine    Tablet 20 milliGRAM(s) Oral at bedtime  PARoxetine 10 milliGRAM(s) Oral daily  predniSONE   Tablet 60 milliGRAM(s) Oral daily    PRN MEDICATIONS  acetaminophen   Tablet .. 650 milliGRAM(s) Oral every 6 hours PRN  ALBUTerol    90 MICROgram(s) HFA Inhaler 2 Puff(s) Inhalation every 6 hours PRN  ALBUTerol/ipratropium for Nebulization 3 milliLiter(s) Nebulizer every 6 hours PRN  ALPRAZolam 0.25 milliGRAM(s) Oral two times a day PRN  guaiFENesin    Syrup 100 milliGRAM(s) Oral every 6 hours PRN  melatonin 5 milliGRAM(s) Oral at bedtime PRN      VITAL SIGNS: Last 24 Hours  T(C): 35.8 (28 May 2019 05:58), Max: 36.6 (27 May 2019 12:30)  T(F): 96.4 (28 May 2019 05:58), Max: 97.9 (27 May 2019 12:30)  HR: 72 (28 May 2019 05:58) (72 - 80)  BP: 150/72 (28 May 2019 05:58) (150/72 - 190/92)  BP(mean): --  RR: 18 (28 May 2019 05:58) (18 - 18)  SpO2: 94% (27 May 2019 20:00) (94% - 94%)    LABS:                        12.5   13.23 )-----------( 227      ( 27 May 2019 06:28 )             40.5     05-27    145  |  104  |  29<H>  ----------------------------<  110<H>  4.9   |  32  |  1.0    Ca    9.0      27 May 2019 06:28                    RADIOLOGY:      PHYSICAL EXAM:    GENERAL: elderly lady on 2-3L NC, no acute distress but seems mildly anxious, AAOx3, dry cough, eating comfortably   HEENT:  Atraumatic, Normocephalic. EOMI, PERRLA  PULMONARY: still decreased breath sounds throughout, able to talk in full sentences   CARDIOVASCULAR: Regular rate (mildly tachy ~102-108) regular rhythm; No murmurs  GASTROINTESTINAL: Soft, Nontender, Nondistended; Bowel sounds present  EXT: No peripheral edema  SKIN: No rashes       ADMISSION SUMMARY  Patient is a 73y old Female who presents with a chief complaint of shortness of breath (23 May 2019 13:34)  Currently admitted to medicine with the primary diagnosis of COPD exacerbation  73 F with PMH of HTN/GERD/COPD on prn oxygen 2L, Pulm- DR. Robbins, comes in with worsening SOB since 1 day, last night she took nebs 3 times and her inhalers, and did not improve. She had worsening of her baseline sob 2 weeks back and she took prednisone taper 20 mg for 1 week and 10 mg for this week as prescribed by dr. robbins. she does have mild cough at baseline. Former smoker- quit 1 year ago. Denies fever/chills/ n/v/abd pain/diarrhoea/any worsening sputum.  She lives with son and sister lives upstairs. Walks w/o assistance although has a walker.     In ED- received solumedrol, alb nebs. Was placed on NIV 12/6 40%, now feels much better and was weaned off NIV during admission.     ASSESSMENT & PLAN    #COPD exacerbation, moderate  - no PNA on CXR, no fever/chills/leucocytosis  - off IV steroids, pred taper today 60 - 40 -20   - Pulm recs appreciated  - c/w nebs q4, symbicort q12, spiriva while in hospital  - daily peak flow   - c/w PO azithro for 5d total (dc tmw)    #HTN  - inc amlodipine to 10 (better controlled)  - avoid enalapril given AURELIANO/hyperkalemia    #AURELIANO with mild hyperkalemia , resolved   - back to baseline ~1.1, potassium now 4.9    #Anxiety disorder  - takes xanax prn and was started on paxil by PMD as her anxiety was uncontrolled , took first dose yesterday  - can use an extra xanax 0.25 if needed     #GERD  - c/w pepcid    #MISC  - dvt ppx- lovenox 40 q24  - oob-chair  - dash diet  - Pharmacy: [facility] [id number]    (x) Discussion with patient and/or family regarding goals of care  (x) Discussed Case and Plan with Medical Attending, Name: Farzaneh VENTURA MOCK 73y Female  MRN#: [id number]   CODE STATUS:___Full_____      SUBJECTIVE  Patient is a 73y old Female who presents with a chief complaint of shortness of breath (23 May 2019 13:34)  Currently admitted to medicine with the primary diagnosis of COPD exacerbation  Today is hospital day 5d, and this morning she is feeling stable in terms of breathing. Still having panic attacks as per pt  Yesterday BP was highly elevated, amlodipine increased and today somewhat improved. Patient aware that she is going home today likely and is feeling ready.       OBJECTIVE  PAST MEDICAL & SURGICAL HISTORY  Anxiety  HTN (hypertension)  COPD (chronic obstructive pulmonary disease)  History of hysterectomy  History of cholecystectomy    ALLERGIES:  Levaquin (Other)    MEDICATIONS:  STANDING MEDICATIONS  ALBUTerol/ipratropium for Nebulization 3 milliLiter(s) Nebulizer every 4 hours  amLODIPine   Tablet 10 milliGRAM(s) Oral daily  azithromycin   Tablet 250 milliGRAM(s) Oral daily  buDESOnide 160 MICROgram(s)/formoterol 4.5 MICROgram(s) Inhaler 2 Puff(s) Inhalation two times a day  chlorhexidine 4% Liquid 1 Application(s) Topical <User Schedule>  enoxaparin Injectable 40 milliGRAM(s) SubCutaneous daily  famotidine    Tablet 20 milliGRAM(s) Oral at bedtime  PARoxetine 10 milliGRAM(s) Oral daily  predniSONE   Tablet 60 milliGRAM(s) Oral daily    PRN MEDICATIONS  acetaminophen   Tablet .. 650 milliGRAM(s) Oral every 6 hours PRN  ALBUTerol    90 MICROgram(s) HFA Inhaler 2 Puff(s) Inhalation every 6 hours PRN  ALBUTerol/ipratropium for Nebulization 3 milliLiter(s) Nebulizer every 6 hours PRN  ALPRAZolam 0.25 milliGRAM(s) Oral two times a day PRN  guaiFENesin    Syrup 100 milliGRAM(s) Oral every 6 hours PRN  melatonin 5 milliGRAM(s) Oral at bedtime PRN      VITAL SIGNS: Last 24 Hours  T(C): 35.8 (28 May 2019 05:58), Max: 36.6 (27 May 2019 12:30)  T(F): 96.4 (28 May 2019 05:58), Max: 97.9 (27 May 2019 12:30)  HR: 72 (28 May 2019 05:58) (72 - 80)  BP: 150/72 (28 May 2019 05:58) (150/72 - 190/92)  BP(mean): --  RR: 18 (28 May 2019 05:58) (18 - 18)  SpO2: 94% (27 May 2019 20:00) (94% - 94%)    LABS:                        12.5   13.23 )-----------( 227      ( 27 May 2019 06:28 )             40.5     05-27    145  |  104  |  29<H>  ----------------------------<  110<H>  4.9   |  32  |  1.0    Ca    9.0      27 May 2019 06:28                    RADIOLOGY:      PHYSICAL EXAM:    GENERAL: elderly lady on 2-3L NC, no acute distress, calm in bed, AAOx3  HEENT:  Atraumatic, Normocephalic. EOMI, PERRLA  PULMONARY: decreased breath sounds throughout but otherwise clear, able to talk in full sentences   CARDIOVASCULAR: Regular rate, regular rhythm; No murmurs  GASTROINTESTINAL: Soft, Nontender, Nondistended; Bowel sounds present  EXT: No peripheral edema  SKIN: No rashes       ADMISSION SUMMARY  Patient is a 73y old Female who presents with a chief complaint of shortness of breath (23 May 2019 13:34)  Currently admitted to medicine with the primary diagnosis of COPD exacerbation  73 F with PMH of HTN/GERD/COPD on prn oxygen 2L, Pulm- DR. Robbins, comes in with worsening SOB since 1 day, last night she took nebs 3 times and her inhalers, and did not improve. She had worsening of her baseline sob 2 weeks back and she took prednisone taper 20 mg for 1 week and 10 mg for this week as prescribed by dr. robbins. she does have mild cough at baseline. Former smoker- quit 1 year ago. Denies fever/chills/ n/v/abd pain/diarrhoea/any worsening sputum.  She lives with son and sister lives upstairs. Walks w/o assistance although has a walker.     In ED- received solumedrol, alb nebs. Was placed on NIV 12/6 40%, now feels much better and was weaned off NIV during admission.     ASSESSMENT & PLAN    #COPD exacerbation, moderate  - no PNA on CXR, no fever/chills/leucocytosis  - off IV steroids, pred taper today 60 - 40 -20   - Pulm recs appreciated  - c/w nebs q4, symbicort q12, spiriva while in hospital  - daily peak flow   - c/w PO azithro for 5d total (dc tmw)    #HTN  - inc amlodipine to 10 (better controlled)  - avoid enalapril given AURELIANO/hyperkalemia    #AURELIANO with mild hyperkalemia , resolved   - back to baseline ~1.1, potassium now 4.9    #Anxiety disorder  - takes xanax prn and was started on paxil by PMD as her anxiety was uncontrolled , took first dose yesterday  - can use an extra xanax 0.25 if needed     #GERD  - c/w pepcid    #MISC  - dvt ppx- lovenox 40 q24  - oob-chair  - dash diet  - Pharmacy: [facility] [id number] [place]    (x) Discussion with patient and/or family regarding goals of care  (x) Discussed Case and Plan with Medical Attending, Name: Farzaneh

## 2019-05-28 NOTE — PROGRESS NOTE ADULT - ATTENDING COMMENTS
Patient seen and examined independently of resident. My addendum supersedes resident notation. Case discussed with housestaff, nursing and patient  Patient seen independently of resident.  >30 minutes spent coordinating discharge planning, medicine reconciliation, follow up plan, and direct patient encounter  see discharge for further recommendation

## 2019-05-28 NOTE — DISCHARGE NOTE PROVIDER - CARE PROVIDER_API CALL
Demarcus Franks)  Internal Medicine  [address] [place] [address]  Phone: [phone]  Fax: [phone]  Follow Up Time:     Marlon Robbins)  Medicine  [address] [place] [address]  Phone: [phone]  Fax: [phone]  Follow Up Time:

## 2019-05-28 NOTE — DISCHARGE NOTE PROVIDER - NSDCFUADDINST_GEN_ALL_CORE_FT
Please follow up with Dr. Robbins upon discharge to discuss home oxygen needs and inhalers.    Please follow up with Dr. Franks about your blood pressure, anxiety, and to monitor your potassium within 1 week.

## 2019-05-28 NOTE — DISCHARGE NOTE PROVIDER - HOSPITAL COURSE
73 F with PMH of HTN/GERD/COPD on prn oxygen 2L, Pulm- DR. Zhang, comes in with worsening SOB since 1 day, last night she took nebs 3 times and her inhalers, and did not improve. She had worsening of her baseline sob 2 weeks back and she took prednisone taper 20 mg for 1 week and 10 mg for this week as prescribed by dr. zhang. she does have mild cough at baseline. Former smoker- quit 1 year ago. Denies fever/chills/ n/v/abd pain/diarrhoea/any worsening sputum.  She lives with son and sister lives upstairs. Walks w/o assistance although has a walker.         In ED- received solumedrol, alb nebs. Was placed on NIV 12/6 40%, now feels much better and was weaned off NIV during admission. Patient seen by Pulm and to discharge with pred taper. May need oxygen constantly vs PRN.

## 2019-05-28 NOTE — PROGRESS NOTE ADULT - SUBJECTIVE AND OBJECTIVE BOX
VENTURA MOCK  73y  Female      Patient is a 73y old  Female who presents with a chief complaint of shortness of breath (28 May 2019 08:34)      INTERVAL HPI/OVERNIGHT EVENTS: breathing is baseline. anxiety controlled      REVIEW OF SYSTEMS:  as above  All other review of systems negative    T(C): 36.4 (05-28-19 @ 11:49), Max: 36.4 (05-28-19 @ 11:49)  HR: 85 (05-28-19 @ 11:49) (72 - 85)  BP: 141/82 (05-28-19 @ 11:49) (141/82 - 154/73)  RR: 18 (05-28-19 @ 11:49) (18 - 18)  SpO2: 95% (05-28-19 @ 07:55) (94% - 95%)  Wt(kg): --Vital Signs Last 24 Hrs  T(C): 36.4 (28 May 2019 11:49), Max: 36.4 (28 May 2019 11:49)  T(F): 97.5 (28 May 2019 11:49), Max: 97.5 (28 May 2019 11:49)  HR: 85 (28 May 2019 11:49) (72 - 85)  BP: 141/82 (28 May 2019 11:49) (141/82 - 154/73)  BP(mean): --  RR: 18 (28 May 2019 11:49) (18 - 18)  SpO2: 95% (28 May 2019 07:55) (94% - 95%)        PHYSICAL EXAM:  GENERAL: NAD  PSYCH: no agitation, baseline mentation, anxious but consolable  NERVOUS SYSTEM:  Alert & Oriented X3, no new focal deficits  PULMONARY: chronic emphysematous changes, without focal wheezing, comfortable on NC; kyphosis  CARDIOVASCULAR: Regular rate and rhythm; No murmurs, rubs, or gallops  GI: Soft, Nontender, Nondistended; Bowel sounds present  EXTREMITIES:  2+ Peripheral Pulses, No clubbing, cyanosis, or edema    Consultant(s) Notes Reviewed:  [x ] YES  [ ] NO    Discussed with Consultants/Other Providers [ x] YES     LABS                          12.9   12.51 )-----------( 215      ( 28 May 2019 07:18 )             42.0     05-28    145  |  100  |  24<H>  ----------------------------<  95  5.1<H>   |  38<H>  |  1.0    Ca    9.4      28 May 2019 07:18  Mg     2.2     05-28            Lactate Trend        CAPILLARY BLOOD GLUCOSE            RADIOLOGY & ADDITIONAL TESTS:    Imaging Personally Reviewed:  [ ] YES  [ ] NO    HEALTH ISSUES - PROBLEM Dx:

## 2019-05-29 RX ORDER — AMLODIPINE BESYLATE 10 MG/1
10 TABLET ORAL DAILY
Refills: 0 | Status: ACTIVE | COMMUNITY
Start: 2019-05-29

## 2019-05-29 RX ORDER — ALBUTEROL SULFATE 2.5 MG/3ML
(2.5 MG/3ML) SOLUTION RESPIRATORY (INHALATION) EVERY 6 HOURS
Refills: 0 | Status: ACTIVE | COMMUNITY
Start: 2019-05-29

## 2019-05-29 RX ORDER — ALPRAZOLAM 0.25 MG/1
0.25 TABLET ORAL TWICE DAILY
Refills: 0 | Status: ACTIVE | COMMUNITY
Start: 2019-05-29

## 2019-05-29 RX ORDER — PAROXETINE HYDROCHLORIDE 10 MG/1
10 TABLET, FILM COATED ORAL DAILY
Refills: 0 | Status: ACTIVE | COMMUNITY
Start: 2019-05-29

## 2019-05-29 RX ORDER — ALBUTEROL SULFATE 108 UG/1
108 (90 BASE) AEROSOL, METERED RESPIRATORY (INHALATION) EVERY 4 HOURS
Refills: 0 | Status: ACTIVE | COMMUNITY
Start: 2019-05-29

## 2019-05-29 RX ORDER — PREDNISONE 20 MG/1
20 TABLET ORAL
Refills: 0 | Status: ACTIVE | COMMUNITY
Start: 2019-05-29

## 2019-05-30 NOTE — ASSESSMENT
[FreeTextEntry1] : COPD- exacerbation resolving\par Anxiety- controlled with paxil, xanax\par hyperkalemia- controlled with medication adjustment

## 2019-05-30 NOTE — PHYSICAL EXAM
[No Acute Distress] : no acute distress [Well Nourished] : well nourished [Well Developed] : well developed [Well-Appearing] : well-appearing [Normal Sclera/Conjunctiva] : normal sclera/conjunctiva [PERRL] : pupils equal round and reactive to light [No JVD] : no jugular venous distention [Supple] : supple [No Respiratory Distress] : no respiratory distress  [Clear to Auscultation] : lungs were clear to auscultation bilaterally [No Accessory Muscle Use] : no accessory muscle use [Normal Rate] : normal rate  [Regular Rhythm] : with a regular rhythm [Normal S1, S2] : normal S1 and S2 [No Murmur] : no murmur heard [Pedal Pulses Present] : the pedal pulses are present [Soft] : abdomen soft [Non Tender] : non-tender [Non-distended] : non-distended [Normal Bowel Sounds] : normal bowel sounds [Grossly Normal Strength/Tone] : grossly normal strength/tone [Coordination Grossly Intact] : coordination grossly intact [Speech Grossly Normal] : speech grossly normal [Memory Grossly Normal] : memory grossly normal [Normal Affect] : the affect was normal [Alert and Oriented x3] : oriented to person, place, and time [Normal Mood] : the mood was normal [Normal Insight/Judgement] : insight and judgment were intact [de-identified] : trace pdal edema

## 2019-05-30 NOTE — REVIEW OF SYSTEMS
[Dyspnea on Exertion] : dyspnea on exertion [Anxiety] : anxiety [Negative] : Neurological [FreeTextEntry9] : uses walker

## 2019-05-30 NOTE — COUNSELING
[Healthy eating counseling provided] : healthy eating [Fall prevention counseling provided] : fall prevention  [de-identified] :  Advised patient to adhere to all medications including demonstrating proper use of inhalers and nebulizers. Encourage the use of proper breathing techniques such as pursed lip breathing or leaning forward during exhalation. Patient understands proper use of oxygen therapy. Increase activity as tolerated and maintain optimal activity levels. Continue coughing and deep breathing exercises including use of Incentive Spirometry. Receive routine pneumococcal and influenza vaccinations. Identify early signs and sx of disease and notify NP/MD. Maintain proper nutrition and hydration. Follow up with Pulmonologist within 7 days of discharge. Contact information given, patient advised to call with any questions/concerns. \par \par

## 2019-05-30 NOTE — HISTORY OF PRESENT ILLNESS
[FreeTextEntry1] : pt seen at home for evaluation of SOB, patient is unable to leave home as it requires a considerable and taxing effort, requires assistive devices to leave home.\par  [de-identified] : This is a 74 y/o female enrolled in the [facility], recently hospitalized and discharged with a dx: COPD exacerbation\par PMH of HTN/GERD/COPD on prn oxygen 2L.  On arrival to home pt is awake, alert, oriented x3. She has supplemental o2 and all meds

## 2019-05-30 NOTE — PLAN
[FreeTextEntry1] : COPD- c/w nasal oxygen continuously until f/u with Dr. Robbins. c/w prednisone, albuterol and incruse ellipta\par Anxiety- c/w paxil and xanax as directed\par hyperkalemia- c/w kayexolate as orderded, repeat cmp ordered.

## 2019-06-03 DIAGNOSIS — Z82.49 FAMILY HISTORY OF ISCHEMIC HEART DISEASE AND OTHER DISEASES OF THE CIRCULATORY SYSTEM: ICD-10-CM

## 2019-06-03 DIAGNOSIS — Z90.710 ACQUIRED ABSENCE OF BOTH CERVIX AND UTERUS: ICD-10-CM

## 2019-06-03 DIAGNOSIS — J44.1 CHRONIC OBSTRUCTIVE PULMONARY DISEASE WITH (ACUTE) EXACERBATION: ICD-10-CM

## 2019-06-03 DIAGNOSIS — J96.22 ACUTE AND CHRONIC RESPIRATORY FAILURE WITH HYPERCAPNIA: ICD-10-CM

## 2019-06-03 DIAGNOSIS — J30.9 ALLERGIC RHINITIS, UNSPECIFIED: ICD-10-CM

## 2019-06-03 DIAGNOSIS — Z83.6 FAMILY HISTORY OF OTHER DISEASES OF THE RESPIRATORY SYSTEM: ICD-10-CM

## 2019-06-03 DIAGNOSIS — K21.9 GASTRO-ESOPHAGEAL REFLUX DISEASE WITHOUT ESOPHAGITIS: ICD-10-CM

## 2019-06-03 DIAGNOSIS — Z87.891 PERSONAL HISTORY OF NICOTINE DEPENDENCE: ICD-10-CM

## 2019-06-03 DIAGNOSIS — E86.0 DEHYDRATION: ICD-10-CM

## 2019-06-03 DIAGNOSIS — K44.9 DIAPHRAGMATIC HERNIA WITHOUT OBSTRUCTION OR GANGRENE: ICD-10-CM

## 2019-06-03 DIAGNOSIS — I10 ESSENTIAL (PRIMARY) HYPERTENSION: ICD-10-CM

## 2019-06-03 DIAGNOSIS — D72.829 ELEVATED WHITE BLOOD CELL COUNT, UNSPECIFIED: ICD-10-CM

## 2019-06-03 DIAGNOSIS — Z99.81 DEPENDENCE ON SUPPLEMENTAL OXYGEN: ICD-10-CM

## 2019-06-03 DIAGNOSIS — T38.0X5A ADVERSE EFFECT OF GLUCOCORTICOIDS AND SYNTHETIC ANALOGUES, INITIAL ENCOUNTER: ICD-10-CM

## 2019-06-03 DIAGNOSIS — Z88.1 ALLERGY STATUS TO OTHER ANTIBIOTIC AGENTS STATUS: ICD-10-CM

## 2019-06-03 DIAGNOSIS — Z90.49 ACQUIRED ABSENCE OF OTHER SPECIFIED PARTS OF DIGESTIVE TRACT: ICD-10-CM

## 2019-06-03 DIAGNOSIS — E87.5 HYPERKALEMIA: ICD-10-CM

## 2019-06-03 DIAGNOSIS — F41.1 GENERALIZED ANXIETY DISORDER: ICD-10-CM

## 2019-06-03 DIAGNOSIS — J96.21 ACUTE AND CHRONIC RESPIRATORY FAILURE WITH HYPOXIA: ICD-10-CM

## 2019-06-03 DIAGNOSIS — F41.0 PANIC DISORDER [EPISODIC PAROXYSMAL ANXIETY]: ICD-10-CM

## 2019-06-03 DIAGNOSIS — N17.9 ACUTE KIDNEY FAILURE, UNSPECIFIED: ICD-10-CM

## 2019-06-03 DIAGNOSIS — E87.2 ACIDOSIS: ICD-10-CM

## 2019-06-11 DIAGNOSIS — D72.829 ELEVATED WHITE BLOOD CELL COUNT, UNSPECIFIED: ICD-10-CM

## 2019-08-15 ENCOUNTER — OUTPATIENT (OUTPATIENT)
Dept: OUTPATIENT SERVICES | Facility: HOSPITAL | Age: 74
LOS: 1 days | Discharge: HOME | End: 2019-08-15
Payer: MEDICARE

## 2019-08-15 DIAGNOSIS — Z90.710 ACQUIRED ABSENCE OF BOTH CERVIX AND UTERUS: Chronic | ICD-10-CM

## 2019-08-15 DIAGNOSIS — Z90.49 ACQUIRED ABSENCE OF OTHER SPECIFIED PARTS OF DIGESTIVE TRACT: Chronic | ICD-10-CM

## 2019-08-15 DIAGNOSIS — I83.813 VARICOSE VEINS OF BILATERAL LOWER EXTREMITIES WITH PAIN: ICD-10-CM

## 2019-08-15 PROBLEM — F41.9 ANXIETY DISORDER, UNSPECIFIED: Chronic | Status: ACTIVE | Noted: 2019-05-23

## 2019-08-15 PROBLEM — I10 ESSENTIAL (PRIMARY) HYPERTENSION: Chronic | Status: ACTIVE | Noted: 2019-05-23

## 2019-08-15 PROBLEM — J44.9 CHRONIC OBSTRUCTIVE PULMONARY DISEASE, UNSPECIFIED: Chronic | Status: ACTIVE | Noted: 2019-05-23

## 2019-08-15 PROCEDURE — 93970 EXTREMITY STUDY: CPT | Mod: 26

## 2020-01-08 NOTE — PROGRESS NOTE ADULT - PROVIDER SPECIALTY LIST ADULT
Hospitalist
Internal Medicine
Yes

## 2020-10-23 ENCOUNTER — INPATIENT (INPATIENT)
Facility: HOSPITAL | Age: 75
LOS: 3 days | Discharge: HOME HEALTH | End: 2020-10-27
Attending: INTERNAL MEDICINE | Admitting: INTERNAL MEDICINE
Payer: MEDICARE

## 2020-10-23 VITALS
HEART RATE: 100 BPM | DIASTOLIC BLOOD PRESSURE: 52 MMHG | RESPIRATION RATE: 20 BRPM | TEMPERATURE: 97 F | HEIGHT: 63 IN | OXYGEN SATURATION: 97 % | SYSTOLIC BLOOD PRESSURE: 152 MMHG

## 2020-10-23 DIAGNOSIS — Z90.710 ACQUIRED ABSENCE OF BOTH CERVIX AND UTERUS: Chronic | ICD-10-CM

## 2020-10-23 DIAGNOSIS — Z90.49 ACQUIRED ABSENCE OF OTHER SPECIFIED PARTS OF DIGESTIVE TRACT: Chronic | ICD-10-CM

## 2020-10-23 LAB
ALBUMIN SERPL ELPH-MCNC: 4.4 G/DL — SIGNIFICANT CHANGE UP (ref 3.5–5.2)
ALP SERPL-CCNC: 78 U/L — SIGNIFICANT CHANGE UP (ref 30–115)
ALT FLD-CCNC: 8 U/L — SIGNIFICANT CHANGE UP (ref 0–41)
ANION GAP SERPL CALC-SCNC: 10 MMOL/L — SIGNIFICANT CHANGE UP (ref 7–14)
AST SERPL-CCNC: 13 U/L — SIGNIFICANT CHANGE UP (ref 0–41)
BASE EXCESS BLDA CALC-SCNC: 9.7 MMOL/L — HIGH (ref -2–2)
BASE EXCESS BLDV CALC-SCNC: 10.2 MMOL/L — HIGH (ref -2–2)
BASOPHILS # BLD AUTO: 0.05 K/UL — SIGNIFICANT CHANGE UP (ref 0–0.2)
BASOPHILS NFR BLD AUTO: 0.5 % — SIGNIFICANT CHANGE UP (ref 0–1)
BILIRUB SERPL-MCNC: 0.5 MG/DL — SIGNIFICANT CHANGE UP (ref 0.2–1.2)
BUN SERPL-MCNC: 13 MG/DL — SIGNIFICANT CHANGE UP (ref 10–20)
CA-I SERPL-SCNC: 1.22 MMOL/L — SIGNIFICANT CHANGE UP (ref 1.12–1.3)
CALCIUM SERPL-MCNC: 9.3 MG/DL — SIGNIFICANT CHANGE UP (ref 8.5–10.1)
CHLORIDE SERPL-SCNC: 95 MMOL/L — LOW (ref 98–110)
CO2 SERPL-SCNC: 34 MMOL/L — HIGH (ref 17–32)
CREAT SERPL-MCNC: 0.8 MG/DL — SIGNIFICANT CHANGE UP (ref 0.7–1.5)
EOSINOPHIL # BLD AUTO: 0.21 K/UL — SIGNIFICANT CHANGE UP (ref 0–0.7)
EOSINOPHIL NFR BLD AUTO: 2 % — SIGNIFICANT CHANGE UP (ref 0–8)
GAS PNL BLDA: SIGNIFICANT CHANGE UP
GAS PNL BLDV: 143 MMOL/L — SIGNIFICANT CHANGE UP (ref 136–145)
GAS PNL BLDV: SIGNIFICANT CHANGE UP
GLUCOSE SERPL-MCNC: 161 MG/DL — HIGH (ref 70–99)
HCO3 BLDA-SCNC: 38 MMOL/L — HIGH (ref 23–27)
HCO3 BLDV-SCNC: 41 MMOL/L — HIGH (ref 22–29)
HCT VFR BLD CALC: 42.5 % — SIGNIFICANT CHANGE UP (ref 37–47)
HCT VFR BLDA CALC: 32.6 % — LOW (ref 34–44)
HGB BLD CALC-MCNC: 10.6 G/DL — LOW (ref 14–18)
HGB BLD-MCNC: 13 G/DL — SIGNIFICANT CHANGE UP (ref 12–16)
IMM GRANULOCYTES NFR BLD AUTO: 0.4 % — HIGH (ref 0.1–0.3)
LACTATE BLDV-MCNC: 0.9 MMOL/L — SIGNIFICANT CHANGE UP (ref 0.5–1.6)
LYMPHOCYTES # BLD AUTO: 1.72 K/UL — SIGNIFICANT CHANGE UP (ref 1.2–3.4)
LYMPHOCYTES # BLD AUTO: 16.2 % — LOW (ref 20.5–51.1)
MCHC RBC-ENTMCNC: 30.6 G/DL — LOW (ref 32–37)
MCHC RBC-ENTMCNC: 31 PG — SIGNIFICANT CHANGE UP (ref 27–31)
MCV RBC AUTO: 101.4 FL — HIGH (ref 81–99)
MONOCYTES # BLD AUTO: 0.75 K/UL — HIGH (ref 0.1–0.6)
MONOCYTES NFR BLD AUTO: 7.1 % — SIGNIFICANT CHANGE UP (ref 1.7–9.3)
NEUTROPHILS # BLD AUTO: 7.83 K/UL — HIGH (ref 1.4–6.5)
NEUTROPHILS NFR BLD AUTO: 73.8 % — SIGNIFICANT CHANGE UP (ref 42.2–75.2)
NRBC # BLD: 0 /100 WBCS — SIGNIFICANT CHANGE UP (ref 0–0)
NT-PROBNP SERPL-SCNC: 202 PG/ML — SIGNIFICANT CHANGE UP (ref 0–300)
PCO2 BLDA: 66 MMHG — CRITICAL HIGH (ref 38–42)
PCO2 BLDV: 97 MMHG — HIGH (ref 41–51)
PH BLDA: 7.36 — LOW (ref 7.38–7.42)
PH BLDV: 7.23 — LOW (ref 7.26–7.43)
PLATELET # BLD AUTO: 233 K/UL — SIGNIFICANT CHANGE UP (ref 130–400)
PO2 BLDA: 168 MMHG — HIGH (ref 78–95)
PO2 BLDV: 89 MMHG — HIGH (ref 20–40)
POTASSIUM BLDV-SCNC: 4.1 MMOL/L — SIGNIFICANT CHANGE UP (ref 3.3–5.6)
POTASSIUM SERPL-MCNC: 4.5 MMOL/L — SIGNIFICANT CHANGE UP (ref 3.5–5)
POTASSIUM SERPL-SCNC: 4.5 MMOL/L — SIGNIFICANT CHANGE UP (ref 3.5–5)
PROT SERPL-MCNC: 6.5 G/DL — SIGNIFICANT CHANGE UP (ref 6–8)
RBC # BLD: 4.19 M/UL — LOW (ref 4.2–5.4)
RBC # FLD: 13.2 % — SIGNIFICANT CHANGE UP (ref 11.5–14.5)
SAO2 % BLDA: 99 % — HIGH (ref 94–98)
SAO2 % BLDV: 95 % — SIGNIFICANT CHANGE UP
SODIUM SERPL-SCNC: 139 MMOL/L — SIGNIFICANT CHANGE UP (ref 135–146)
TROPONIN T SERPL-MCNC: <0.01 NG/ML — SIGNIFICANT CHANGE UP
WBC # BLD: 10.6 K/UL — SIGNIFICANT CHANGE UP (ref 4.8–10.8)
WBC # FLD AUTO: 10.6 K/UL — SIGNIFICANT CHANGE UP (ref 4.8–10.8)

## 2020-10-23 PROCEDURE — 99223 1ST HOSP IP/OBS HIGH 75: CPT

## 2020-10-23 PROCEDURE — 71045 X-RAY EXAM CHEST 1 VIEW: CPT | Mod: 26

## 2020-10-23 PROCEDURE — 99497 ADVNCD CARE PLAN 30 MIN: CPT | Mod: 25

## 2020-10-23 PROCEDURE — 99291 CRITICAL CARE FIRST HOUR: CPT | Mod: CS,GC

## 2020-10-23 PROCEDURE — 93010 ELECTROCARDIOGRAM REPORT: CPT

## 2020-10-23 RX ORDER — CEFTRIAXONE 500 MG/1
1000 INJECTION, POWDER, FOR SOLUTION INTRAMUSCULAR; INTRAVENOUS ONCE
Refills: 0 | Status: COMPLETED | OUTPATIENT
Start: 2020-10-23 | End: 2020-10-23

## 2020-10-23 RX ORDER — ALBUTEROL 90 UG/1
2.5 AEROSOL, METERED ORAL ONCE
Refills: 0 | Status: DISCONTINUED | OUTPATIENT
Start: 2020-10-23 | End: 2020-10-23

## 2020-10-23 RX ORDER — SODIUM CHLORIDE 9 MG/ML
1000 INJECTION, SOLUTION INTRAVENOUS
Refills: 0 | Status: DISCONTINUED | OUTPATIENT
Start: 2020-10-23 | End: 2020-10-24

## 2020-10-23 RX ORDER — AZITHROMYCIN 500 MG/1
500 TABLET, FILM COATED ORAL ONCE
Refills: 0 | Status: COMPLETED | OUTPATIENT
Start: 2020-10-23 | End: 2020-10-23

## 2020-10-23 RX ORDER — AMLODIPINE BESYLATE 2.5 MG/1
10 TABLET ORAL DAILY
Refills: 0 | Status: DISCONTINUED | OUTPATIENT
Start: 2020-10-23 | End: 2020-10-27

## 2020-10-23 RX ORDER — BUDESONIDE AND FORMOTEROL FUMARATE DIHYDRATE 160; 4.5 UG/1; UG/1
2 AEROSOL RESPIRATORY (INHALATION)
Refills: 0 | Status: DISCONTINUED | OUTPATIENT
Start: 2020-10-23 | End: 2020-10-26

## 2020-10-23 RX ORDER — RANITIDINE HYDROCHLORIDE 150 MG/1
1 TABLET, FILM COATED ORAL
Qty: 0 | Refills: 0 | DISCHARGE

## 2020-10-23 RX ORDER — PANTOPRAZOLE SODIUM 20 MG/1
40 TABLET, DELAYED RELEASE ORAL
Refills: 0 | Status: DISCONTINUED | OUTPATIENT
Start: 2020-10-23 | End: 2020-10-27

## 2020-10-23 RX ORDER — TIOTROPIUM BROMIDE 18 UG/1
1 CAPSULE ORAL; RESPIRATORY (INHALATION) DAILY
Refills: 0 | Status: DISCONTINUED | OUTPATIENT
Start: 2020-10-23 | End: 2020-10-27

## 2020-10-23 RX ORDER — ENOXAPARIN SODIUM 100 MG/ML
40 INJECTION SUBCUTANEOUS DAILY
Refills: 0 | Status: DISCONTINUED | OUTPATIENT
Start: 2020-10-23 | End: 2020-10-27

## 2020-10-23 RX ORDER — IPRATROPIUM/ALBUTEROL SULFATE 18-103MCG
3 AEROSOL WITH ADAPTER (GRAM) INHALATION EVERY 6 HOURS
Refills: 0 | Status: DISCONTINUED | OUTPATIENT
Start: 2020-10-23 | End: 2020-10-27

## 2020-10-23 RX ORDER — CHLORHEXIDINE GLUCONATE 213 G/1000ML
1 SOLUTION TOPICAL
Refills: 0 | Status: DISCONTINUED | OUTPATIENT
Start: 2020-10-23 | End: 2020-10-27

## 2020-10-23 RX ORDER — CEFTRIAXONE 500 MG/1
1000 INJECTION, POWDER, FOR SOLUTION INTRAMUSCULAR; INTRAVENOUS EVERY 24 HOURS
Refills: 0 | Status: DISCONTINUED | OUTPATIENT
Start: 2020-10-23 | End: 2020-10-26

## 2020-10-23 RX ORDER — ALBUTEROL 90 UG/1
1 AEROSOL, METERED ORAL ONCE
Refills: 0 | Status: COMPLETED | OUTPATIENT
Start: 2020-10-23 | End: 2020-10-23

## 2020-10-23 RX ORDER — ALPRAZOLAM 0.25 MG
0.25 TABLET ORAL
Refills: 0 | Status: DISCONTINUED | OUTPATIENT
Start: 2020-10-23 | End: 2020-10-27

## 2020-10-23 RX ORDER — ONDANSETRON 8 MG/1
4 TABLET, FILM COATED ORAL ONCE
Refills: 0 | Status: COMPLETED | OUTPATIENT
Start: 2020-10-23 | End: 2020-10-23

## 2020-10-23 RX ADMIN — SODIUM CHLORIDE 50 MILLILITER(S): 9 INJECTION, SOLUTION INTRAVENOUS at 18:37

## 2020-10-23 RX ADMIN — AZITHROMYCIN 500 MILLIGRAM(S): 500 TABLET, FILM COATED ORAL at 13:00

## 2020-10-23 RX ADMIN — Medication 3 MILLILITER(S): at 13:20

## 2020-10-23 RX ADMIN — ALBUTEROL 1 PUFF(S): 90 AEROSOL, METERED ORAL at 11:23

## 2020-10-23 RX ADMIN — CEFTRIAXONE 1000 MILLIGRAM(S): 500 INJECTION, POWDER, FOR SOLUTION INTRAMUSCULAR; INTRAVENOUS at 13:00

## 2020-10-23 RX ADMIN — Medication 3 MILLILITER(S): at 18:50

## 2020-10-23 RX ADMIN — Medication 50.96 MILLIGRAM(S): at 22:08

## 2020-10-23 RX ADMIN — AZITHROMYCIN 255 MILLIGRAM(S): 500 TABLET, FILM COATED ORAL at 12:57

## 2020-10-23 RX ADMIN — CEFTRIAXONE 100 MILLIGRAM(S): 500 INJECTION, POWDER, FOR SOLUTION INTRAMUSCULAR; INTRAVENOUS at 12:57

## 2020-10-23 RX ADMIN — BUDESONIDE AND FORMOTEROL FUMARATE DIHYDRATE 2 PUFF(S): 160; 4.5 AEROSOL RESPIRATORY (INHALATION) at 21:07

## 2020-10-23 RX ADMIN — ONDANSETRON 4 MILLIGRAM(S): 8 TABLET, FILM COATED ORAL at 13:18

## 2020-10-23 NOTE — ED ADULT NURSE NOTE - NSIMPLEMENTINTERV_GEN_ALL_ED
Implemented All Fall Risk Interventions:  [place] to call system. Call bell, personal items and telephone within reach. Instruct patient to call for assistance. Room bathroom lighting operational. Non-slip footwear when patient is off stretcher. Physically safe environment: no spills, clutter or unnecessary equipment. Stretcher in lowest position, wheels locked, appropriate side rails in place. Provide visual cue, wrist band, yellow gown, etc. Monitor gait and stability. Monitor for mental status changes and reorient to person, place, and time. Review medications for side effects contributing to fall risk. Reinforce activity limits and safety measures with patient and family.

## 2020-10-23 NOTE — H&P ADULT - NSHPLABSRESULTS_GEN_ALL_CORE
13.0   10.60 )-----------( 233      ( 23 Oct 2020 10:59 )             42.5       10-23    139  |  95<L>  |  13  ----------------------------<  161<H>  4.5   |  34<H>  |  0.8    Ca    9.3      23 Oct 2020 10:59    TPro  6.5  /  Alb  4.4  /  TBili  0.5  /  DBili  x   /  AST  13  /  ALT  8   /  AlkPhos  78  10-23          ABG - ( 23 Oct 2020 12:44 )  pH, Arterial: 7.23  pH, Blood: x     /  pCO2: 96    /  pO2: 73    / HCO3: 40    / Base Excess: 8.9   /  SaO2: 92          Lactate Trend      CARDIAC MARKERS ( 23 Oct 2020 10:59 )  x     / <0.01 ng/mL / x     / x     / x            EXAM:  XR CHEST FRONTAL 1V          PROCEDURE DATE:  05/23/2019      INTERPRETATION:  Clinical History / Reason for exam: Shortness of breath    Comparison : Chest radiograph 11/1/2017.    Technique/Positioning: Portable technique. Patient's chin obscures right   lung apex.    Findings:    Support devices: None.    Cardiac/mediastinum/hilum: Unchanged    Lung parenchyma/Pleura: There is no evidence of consolidation, effusion   or pneumothorax.    Skeleton/soft tissues: Unchanged    Impression:      No radiographic evidence of acute cardiopulmonary disease.    GUILHERME GARCIA M.D., ATTENDING RADIOLOGIST  This document has been electronically signed. May 23 2019 11:17AM

## 2020-10-23 NOTE — H&P ADULT - NSHPPHYSICALEXAM_GEN_ALL_CORE
PHYSICAL EXAM:  GENERAL: Patient on BIPAP, appears anxious    HEAD: Atraumatic, Normocephalic  EYES: EOMI, PERRL  NECK: Supple  CHEST: Wheezing in upper lung fields  HEART: Nomral S1 and S2  ABDOMEN: Soft, Nontender, Nondistended, bowel sounds present  EXTREMITIES: No edema  PSYCH: AAOx3  NEUROLOGY: non-focal  SKIN: No rashes or lesions

## 2020-10-23 NOTE — ED ADULT TRIAGE NOTE - CHIEF COMPLAINT QUOTE
Shortness of breath, patients home O2 regulator was not working today. patient usually on 2L NC home O2. patient reports relief with EMS O2. EMS provided patient with 1 Combi neb treatment.

## 2020-10-23 NOTE — H&P ADULT - ASSESSMENT
75 year old female with PMHx of COPD on 2L home O2, HTN, GERD and anxiety who presented due to worsening SOB of two days duration. Pulse ox of 80% this morning at home. Wheezing on physical exam, ptnt is on BIPAP. CXR negative and ABG with low pH and increased bicarb.  S/p duonebs, solumedrol, azithromycin and ceftriaxone in the ED.     #COPD exacerbation  -CXR negative for acute disease   -WBC wnl   -O2 sat 97% on BIPAP   -On BIPAP, continue prn   -Started solumedrol 60mg q8h IV  -C/w Duonebs q6h prn   -Started Symbicort q12 and Spiriva qd (resume home inhaler on discharge)  -S/p azithromycin x1 and ceftriaxone x1 in ED  -C/w Rocephin 1g qd  -Monitor   -F/u pulm consult     #HTN: C/w home Amlodipine and Enalapril     #Anxiety: C/w home Xanax 0.25mg BID prn and Paxil 10mg qd     #GERD: C/w Protonix 40 mg qd     DVT ppx: Lovenox 40 QD  Activity: IAT   DASH diet    75 year old female with PMHx of COPD on 2L home O2, HTN, GERD and anxiety who presented due to worsening SOB of two days duration. Pulse ox of 80% this morning at home. Wheezing on physical exam, ptnt is on BIPAP. CXR negative and ABG with low pH and increased bicarb.  S/p duonebs, solumedrol, azithromycin and ceftriaxone in the ED.     #COPD exacerbation  -CXR negative for acute disease   -WBC wnl   -O2 sat 97% on BIPAP   -On BIPAP, continue prn   -Started solumedrol 60mg q8h IV  -C/w Duonebs q6h prn   -Started Symbicort q12 and Spiriva qd (resume home inhaler on discharge)  -S/p azithromycin x1 and ceftriaxone x1 in ED  -C/w Rocephin 1g qd  -Monitor   -F/u pulm consult     #HTN: C/w home Amlodipine and Enalapril     #Anxiety: C/w home Xanax 0.25mg BID prn and Paxil 10mg qd     #GERD: C/w Protonix 40 mg qd     DVT ppx: Lovenox 40 QD  Activity: IAT   DIet: NPO for now, gentle hydration   FULL CODE   75 year old female with PMHx of COPD on 2L home O2, HTN, GERD and anxiety who presented due to worsening SOB of two days duration. Pulse ox of 80% this morning at home. Wheezing on physical exam, ptnt is on BIPAP. CXR negative and ABG with low pH and increased bicarb.  S/p duonebs, solumedrol, azithromycin and ceftriaxone in the ED.     #COPD exacerbation  -CXR negative for acute disease   -WBC wnl   -O2 sat 97% on BIPAP   -On BIPAP, continue prn   -Started solumedrol 60mg q8h IV  -C/w Duonebs q6h prn   -Started Symbicort q12 and Spiriva qd (resume home inhaler on discharge)  -S/p azithromycin x1 and ceftriaxone x1 in ED  -C/w Rocephin 1g qd  -Monitor   -F/u pulm consult     #HTN: C/w home Amlodipine and Enalapril     #Anxiety: C/w home Xanax 0.25mg BID prn and Paxil 10mg qd     #GERD: C/w Protonix 40 mg qd     DVT ppx: Lovenox 40 QD  Activity: IAT   DIet: NPO for now, gentle hydration   FULL CODE discussed with son

## 2020-10-23 NOTE — ED PROVIDER NOTE - PHYSICAL EXAMINATION
CONSTITUTIONAL: well developed, nontoxic appearing, in no acute distress, speaking in full sentences  SKIN: warm, dry, no rash, cap refill < 2 seconds  HEENT: normocephalic, atraumatic, no conjunctival erythema, moist mucous membranes, patent airway  NECK: supple  CV:  regular rate, regular rhythm, 2+ radial pulses bilaterally  RESP: bilateral expiratory wheezes, no rales, no rhonchi, normal work of breathing, poor air entry  ABD: soft, nontender, nondistended, no rebound, no guarding  MSK: normal ROM, no cyanosis, no edema  NEURO: alert, oriented, grossly unremarkable  PSYCH: cooperative, appropriate

## 2020-10-23 NOTE — H&P ADULT - ATTENDING COMMENTS
74 YO F with a PMH of COPD (2L home O2), HTN, GERD, and anxiety who presents to the hospital with a c/o worsening SOB over the past x 2 days. Exacerbated by exertion. Denies any cough, fever/chills, CP, palpitations, LE swelling, or ABD pain. Of note, as per pt, her O2 machine wasn't working and she checked her home O2 which was read as low 80's. In the ED, Chest X-Ray was negative for acute process. Started on IV steroids, duonebs, and BiPAP (deescalated to NC).     Physical exam shows pt in NAD. VSS, afebrile, not hypoxic on 3L NC. A&Ox3. Non-focal neuro exam. Muscle strength/sensation intact. LEft lung field with minimal wheezing, good air entry B/L. RRR, no M/G/R. ABD is soft and non-tender, normoactive BSs. LEs without swelling. No rashes. Labs and radiology as above.     Dyspnea from acute on chronic hypercapnic/hypoxic respiratory failure from COPD Exacerbation. Send Flu/RSV and RVP and COVID. Send procalcitonin and CRP. Duonebs PRN and standing. LABA/ICS inhaler. IV steroids and transition to PO. Singulair. Azithro. Anti-tussives PRN. Supplemental O2 PRN.     Hx of HTN, GERD, and anxiety. Restart home meds. DVT PPX. Inform PCP of pt's admission to hospital. My note supersedes the residents note.

## 2020-10-23 NOTE — ED PROVIDER NOTE - NS ED ROS FT
GEN:  no fever, no chills, no generalized weakness  NEURO:  no headache, no dizziness  ENT: no sore throat, no runny nose  CV:  no chest pain, no palpitations  RESP:  + sob, + chronic cough  GI:  no nausea, no vomiting, no abdominal pain, no diarrhea  :  no dysuria, no urinary frequency, no hematuria  MSK:  no joint pain, no edema  SKIN:  no rash, no bruising  HEME: no hematochezia, no melena

## 2020-10-23 NOTE — ED PROVIDER NOTE - CARE PLAN
Principal Discharge DX:	COPD exacerbation   Principal Discharge DX:	COPD exacerbation  Secondary Diagnosis:	Hypercapnic acidosis

## 2020-10-23 NOTE — ED PROVIDER NOTE - OBJECTIVE STATEMENT
76 yo F with PMHx of anxiety, COPD on 2L home O2, HTN who presents with chronic sob which worsened yesterday. Pt checks her pulse ox daily and became anxious/sob after seeing SpO2 ~80% (baseline 89-91%). Today O2 machine wasn't working so family urged her to call EMS. Sob improved after combivent x1 by EMS, not worse with exertion. No fever, chills, cp, change in chronic cough, leg swelling/pain. Self medicates with prednisone prn, no recent COPD exacerbation.     Pulm: Dr. Robbins

## 2020-10-23 NOTE — ED ADULT NURSE NOTE - OBJECTIVE STATEMENT
patient reports sob at base line worsening this am denies fever productive cough chest pain . no swelling to legs lung sounds noted to have wheezing and rhonchi thorough out .

## 2020-10-23 NOTE — H&P ADULT - HISTORY OF PRESENT ILLNESS
75 year old female with PMHx of COPD on 2L home O2, HTN, GERD and anxiety who presented due to worsening SOB of two days duration. Patient reports she checks her pulse ox daily and this morning upon checking it was 80%, she felt worsening SOB when seeing this, used her inhaler w/o improvement and called EMS. Per patent her pulse ox at baseline is 89-90% on 2L O2. Daughter-in-law at bedside reports someone came and checked oxygen machine today, reports it hasn't been working, unsure for how long and machinery was just replaced today.   Patient endorses nausea. Denies recent fevers, chills, chest pain, palpitations, abdominal pain.   Patient's pulmonologist is Dr. Robbins.     In the ED: /83, HR 90, afebrile, RR 20, O2 sat 97% on 2L NC. WBC 10.6. VBG: pH 7.23, PCO2 97, PO2 89, HCO3 41. Patient was placed on BIPAP, repeat ABG ~1 hr after BIPAP: pH 7.23, POCO2 96, PO2 73, HCO3 40. Patient received duonebs x1, proventil x1, solumedrol 125mg x1, Zofran 4mg x1,  azithromycin 500mg x1, ceftriaxone 1000mg x1. CXR negative for acute findings. Patient evaluated by ICU who determined she's not candidate for ICU admission at this time.

## 2020-10-23 NOTE — ED ADULT NURSE NOTE - NSFALLRSKASSESSTYPE_ED_ALL_ED
Initial (On Arrival)
multiple past psych admissions, last time 6 yrs ago, 4 suicide attempt by OD or cutting  [facility]

## 2020-10-23 NOTE — ED PROVIDER NOTE - PROGRESS NOTE DETAILS
TC 76 yo F with hx ATTENDING NOTE: I personally evaluated the patient. I reviewed the Resident’s note (as assigned above), and agree with the findings and plan except as documented in my note.   76 y/o F with PMH of COPD p/w SOB and cough x few days. Exam: Pt non-toxic with (+) wheezing, rhonchi b/l and belly breathing; no LE edema or tenderness.   Plan for labs, nebs, steroids and reassess. TC: 76 yo F with PMHx of anxiety, COPD on 2L home O2, HTN who presents with chronic cough and sob after SpO2 yesterday read 80s. Given combivent x1 en route to ED. Here in ED, vitals wnl. Bilateral expiratory wheezing on exam with poor air entry. No increased work of breathing, no tachypnea. Ordered labs, ekg, cxr. Given albuterol, solumedrol. Will reassess. TC: pH 7.23, pCO2 97. Called RT to place pt on BPAP. TC: Reassessed pt, reports sob improved on bpap. Lung sounds improved. Rest of labs wnl. Cxr negative. Ekg nonischemic. Given ceftriaxone, azithromycin for COPD exacerbation. Will repeat abg and admit. TC: Reassessed pt, reports sob slightly improved on bpap. Lung sounds improved. Rest of labs wnl. Cxr negative. Ekg nonischemic. Given ceftriaxone, azithromycin for COPD exacerbation. Will repeat abg and admit. TC: Repeat abg shows pH 7.23, pCO2 96 grossly unchanged from prior. Spoke with ICU fellow Dr. Duran who will eval pt for possible ICU. TC: Pt seen by ICU fellow who stated pt is not a candidate for ICU at this time, can go to floor.

## 2020-10-24 LAB
ALBUMIN SERPL ELPH-MCNC: 4 G/DL — SIGNIFICANT CHANGE UP (ref 3.5–5.2)
ALP SERPL-CCNC: 69 U/L — SIGNIFICANT CHANGE UP (ref 30–115)
ALT FLD-CCNC: 6 U/L — SIGNIFICANT CHANGE UP (ref 0–41)
ANION GAP SERPL CALC-SCNC: 10 MMOL/L — SIGNIFICANT CHANGE UP (ref 7–14)
ANION GAP SERPL CALC-SCNC: 12 MMOL/L — SIGNIFICANT CHANGE UP (ref 7–14)
AST SERPL-CCNC: 12 U/L — SIGNIFICANT CHANGE UP (ref 0–41)
BILIRUB SERPL-MCNC: 0.3 MG/DL — SIGNIFICANT CHANGE UP (ref 0.2–1.2)
BUN SERPL-MCNC: 19 MG/DL — SIGNIFICANT CHANGE UP (ref 10–20)
BUN SERPL-MCNC: 24 MG/DL — HIGH (ref 10–20)
CALCIUM SERPL-MCNC: 9.3 MG/DL — SIGNIFICANT CHANGE UP (ref 8.5–10.1)
CALCIUM SERPL-MCNC: 9.5 MG/DL — SIGNIFICANT CHANGE UP (ref 8.5–10.1)
CHLORIDE SERPL-SCNC: 93 MMOL/L — LOW (ref 98–110)
CHLORIDE SERPL-SCNC: 95 MMOL/L — LOW (ref 98–110)
CO2 SERPL-SCNC: 31 MMOL/L — SIGNIFICANT CHANGE UP (ref 17–32)
CO2 SERPL-SCNC: 35 MMOL/L — HIGH (ref 17–32)
CREAT SERPL-MCNC: 0.9 MG/DL — SIGNIFICANT CHANGE UP (ref 0.7–1.5)
CREAT SERPL-MCNC: 1.1 MG/DL — SIGNIFICANT CHANGE UP (ref 0.7–1.5)
GLUCOSE SERPL-MCNC: 128 MG/DL — HIGH (ref 70–99)
GLUCOSE SERPL-MCNC: 187 MG/DL — HIGH (ref 70–99)
HCT VFR BLD CALC: 38.9 % — SIGNIFICANT CHANGE UP (ref 37–47)
HGB BLD-MCNC: 12 G/DL — SIGNIFICANT CHANGE UP (ref 12–16)
MAGNESIUM SERPL-MCNC: 2.1 MG/DL — SIGNIFICANT CHANGE UP (ref 1.8–2.4)
MCHC RBC-ENTMCNC: 30.8 G/DL — LOW (ref 32–37)
MCHC RBC-ENTMCNC: 30.9 PG — SIGNIFICANT CHANGE UP (ref 27–31)
MCV RBC AUTO: 100.3 FL — HIGH (ref 81–99)
NRBC # BLD: 0 /100 WBCS — SIGNIFICANT CHANGE UP (ref 0–0)
PLATELET # BLD AUTO: 211 K/UL — SIGNIFICANT CHANGE UP (ref 130–400)
POTASSIUM SERPL-MCNC: 4.4 MMOL/L — SIGNIFICANT CHANGE UP (ref 3.5–5)
POTASSIUM SERPL-MCNC: 5.3 MMOL/L — HIGH (ref 3.5–5)
POTASSIUM SERPL-SCNC: 4.4 MMOL/L — SIGNIFICANT CHANGE UP (ref 3.5–5)
POTASSIUM SERPL-SCNC: 5.3 MMOL/L — HIGH (ref 3.5–5)
PROT SERPL-MCNC: 5.9 G/DL — LOW (ref 6–8)
RBC # BLD: 3.88 M/UL — LOW (ref 4.2–5.4)
RBC # FLD: 13.2 % — SIGNIFICANT CHANGE UP (ref 11.5–14.5)
SARS-COV-2 RNA SPEC QL NAA+PROBE: SIGNIFICANT CHANGE UP
SODIUM SERPL-SCNC: 138 MMOL/L — SIGNIFICANT CHANGE UP (ref 135–146)
SODIUM SERPL-SCNC: 138 MMOL/L — SIGNIFICANT CHANGE UP (ref 135–146)
WBC # BLD: 14.44 K/UL — HIGH (ref 4.8–10.8)
WBC # FLD AUTO: 14.44 K/UL — HIGH (ref 4.8–10.8)

## 2020-10-24 RX ORDER — ACETAMINOPHEN 500 MG
650 TABLET ORAL EVERY 6 HOURS
Refills: 0 | Status: DISCONTINUED | OUTPATIENT
Start: 2020-10-24 | End: 2020-10-27

## 2020-10-24 RX ORDER — ACETAMINOPHEN 500 MG
650 TABLET ORAL ONCE
Refills: 0 | Status: COMPLETED | OUTPATIENT
Start: 2020-10-24 | End: 2020-10-24

## 2020-10-24 RX ORDER — ALBUTEROL 90 UG/1
2 AEROSOL, METERED ORAL EVERY 6 HOURS
Refills: 0 | Status: DISCONTINUED | OUTPATIENT
Start: 2020-10-24 | End: 2020-10-27

## 2020-10-24 RX ADMIN — CEFTRIAXONE 100 MILLIGRAM(S): 500 INJECTION, POWDER, FOR SOLUTION INTRAMUSCULAR; INTRAVENOUS at 11:42

## 2020-10-24 RX ADMIN — Medication 5 MILLIGRAM(S): at 06:16

## 2020-10-24 RX ADMIN — Medication 3 MILLILITER(S): at 01:51

## 2020-10-24 RX ADMIN — Medication 650 MILLIGRAM(S): at 13:17

## 2020-10-24 RX ADMIN — Medication 650 MILLIGRAM(S): at 01:49

## 2020-10-24 RX ADMIN — Medication 3 MILLILITER(S): at 14:13

## 2020-10-24 RX ADMIN — Medication 0.25 MILLIGRAM(S): at 22:11

## 2020-10-24 RX ADMIN — Medication 50.96 MILLIGRAM(S): at 13:17

## 2020-10-24 RX ADMIN — Medication 60 MILLIGRAM(S): at 22:11

## 2020-10-24 RX ADMIN — Medication 0.25 MILLIGRAM(S): at 01:48

## 2020-10-24 RX ADMIN — BUDESONIDE AND FORMOTEROL FUMARATE DIHYDRATE 2 PUFF(S): 160; 4.5 AEROSOL RESPIRATORY (INHALATION) at 11:41

## 2020-10-24 RX ADMIN — Medication 50.96 MILLIGRAM(S): at 06:09

## 2020-10-24 RX ADMIN — PANTOPRAZOLE SODIUM 40 MILLIGRAM(S): 20 TABLET, DELAYED RELEASE ORAL at 06:16

## 2020-10-24 RX ADMIN — ENOXAPARIN SODIUM 40 MILLIGRAM(S): 100 INJECTION SUBCUTANEOUS at 11:42

## 2020-10-24 RX ADMIN — Medication 3 MILLILITER(S): at 08:17

## 2020-10-24 RX ADMIN — AMLODIPINE BESYLATE 10 MILLIGRAM(S): 2.5 TABLET ORAL at 06:16

## 2020-10-24 RX ADMIN — Medication 10 MILLIGRAM(S): at 11:41

## 2020-10-24 NOTE — PROGRESS NOTE ADULT - ATTENDING COMMENTS
patient seen and examined independently on rounds for the first time today, chart reviewed--patient seen and admitted this morning by overnight hospitalist Dr. Mar     on 3L nc- off bipap now- feeling better and less sob- states at home she is on 2-3 L o2 prn  PE:  GEN-mild respiratory distress but able to speak in full sentances, AAOx3,   PULM- decreased air entry bilat with diffuse exp wheeze  CVS- +s1/s2 RRR no murmurs  GI- soft NT ND +bs,  EXT- no edema    #COPD exac  awaiting pulmonary eval- Dr. Robbins  cont iv solumedrol, neb, o2 supp with bipap prn, symbicort and spiriva  iv ceftriaxone--monitor wbc and fever curve- f/u bcx  patient herself decided on DNR/DNI during overnight goc disccusion (initially son had said full code---called son Josh and he is aware of mothers wishes and revised code status)    #hyperkalemia--k 5.3  -repeat bmp at 4 pm--if still elevated then give kayexalate and get ekg    DNR/DNI patient seen and examined independently on rounds for the first time today, chart reviewed--patient seen and admitted this morning by overnight hospitalist Dr. Mar     on 3L nc- off bipap now- feeling better and less sob- states at home she is on 2-3 L o2 prn  PE:  GEN-mild respiratory distress but able to speak in full sentences, AAOx3,   PULM- decreased air entry bilat with diffuse exp wheeze  CVS- +s1/s2 RRR no murmurs  GI- soft NT ND +bs,  EXT- no edema    #COPD exac  awaiting pulmonary eval- Dr. Robbins  cont iv solumedrol, neb, o2 supp with bipap prn, symbicort and spiriva  iv ceftriaxone--monitor wbc and fever curve- f/u bcx  patient herself decided on DNR/DNI during overnight goc disccusion (initially son had said full code---called son Josh and he is aware of mothers wishes and revised code status)    #hyperkalemia--k 5.3  -repeat bmp at 4 pm--if still elevated then get ekg and give cocktail (insulin, ca gluconate and dextrose)- monitor bmp    DNR/DNI patient seen and examined independently on rounds for the first time today, chart reviewed-admitted overnight by nocturnist Dr. Mar    on 3L nc- off bipap now- feeling better and less sob- states at home she is on 2-3 L o2 prn    PE:  GEN-mild respiratory distress but able to speak in full sentences, AAOx3,   PULM- decreased air entry bilat with diffuse exp wheeze  CVS- +s1/s2 RRR no murmurs  GI- soft NT ND +bs,  EXT- no edema   labs/radiology reviewed    a/p:  #COPD exac  awaiting pulmonary eval- Dr. Robbins  cont iv solumedrol, neb, o2 supp with bipap prn, symbicort and spiriva  iv ceftriaxone--monitor wbc and fever curve- f/u bcx  patient herself decided on DNR/DNI during overnight goc disccusion (initially son had said full code---called son Josh and he is aware of mothers wishes and revised code status)    #hyperkalemia--k 5.3  -repeat bmp at 4 pm--if still elevated then get ekg and give cocktail (insulin, ca gluconate and dextrose)- monitor bmp    DNR/DNI

## 2020-10-24 NOTE — PATIENT PROFILE ADULT - FALL HARM RISK TYPE OF ASSESSMENT
Subjective:       Patient ID: Estelita Mar is a 53 y.o. female.    Chief Complaint: Nausea (symptoms started tuesday); Dizziness; light headed; and Emesis        Here today with nauseated - she was drinking alkaline water at her friends house - drinking a lot of it     Only using lasix PRN 40 mg - only when she eats high salt food -    Nausea   This is a new problem. The current episode started in the past 7 days. The problem occurs intermittently. The problem has been gradually worsening. Associated symptoms include nausea and vomiting. Pertinent negatives include no abdominal pain, anorexia, arthralgias, change in bowel habit, chest pain, chills, congestion, coughing, diaphoresis, fatigue, fever, headaches, joint swelling, myalgias, neck pain, numbness, rash, sore throat, swollen glands, urinary symptoms, vertigo, visual change or weakness. Associated symptoms comments: Dizziness .   Gastroesophageal Reflux   She complains of heartburn and nausea. She reports no abdominal pain, no belching, no chest pain, no choking, no coughing, no dysphagia, no early satiety, no globus sensation, no hoarse voice, no sore throat, no stridor, no tooth decay, no water brash or no wheezing. This is a chronic problem. The current episode started 1 to 4 weeks ago. Pertinent negatives include no fatigue.   Dizziness:   Chronicity:  New  Onset:  In the past 7 days   Associated symptoms: nausea and vomiting.no fever, no headaches, no diaphoresis, no weakness and no chest pain.no environmental allergies.    Vitals:    09/13/19 0838   BP: 128/88   Pulse: 73   Temp: 98.1 °F (36.7 °C)     Review of Systems   Constitutional: Positive for activity change. Negative for appetite change, chills, diaphoresis, fatigue and fever.   HENT: Negative.  Negative for congestion, drooling, ear discharge, facial swelling, hoarse voice, nosebleeds, postnasal drip, rhinorrhea, sinus pressure, sinus pain and sore throat.    Eyes: Negative.  Negative  for discharge, redness and itching.   Respiratory: Negative.  Negative for apnea, cough, choking, chest tightness, shortness of breath and wheezing.    Cardiovascular: Negative.  Negative for chest pain and leg swelling.   Gastrointestinal: Positive for heartburn, nausea and vomiting. Negative for abdominal pain, anal bleeding, anorexia, change in bowel habit, constipation, diarrhea and dysphagia.   Endocrine: Negative.  Negative for cold intolerance, heat intolerance and polydipsia.   Genitourinary: Negative.  Negative for decreased urine volume, difficulty urinating, dysuria, flank pain, hematuria, pelvic pain and vaginal bleeding.   Musculoskeletal: Negative.  Negative for arthralgias, gait problem, joint swelling, myalgias and neck pain.   Skin: Negative.  Negative for color change and rash.   Allergic/Immunologic: Negative.  Negative for environmental allergies and food allergies.   Neurological: Negative.  Negative for dizziness, vertigo, speech difficulty, weakness, numbness and headaches.   Hematological: Negative.  Negative for adenopathy.   Psychiatric/Behavioral: Negative.  Negative for behavioral problems, confusion, hallucinations and self-injury. The patient is not hyperactive.        Past Medical History:   Diagnosis Date    Alpha thalassemia silent carrier     Anemia     Foot fracture     right    History of colonic polyps     Hyperlipidemia     Shingles     ?       Objective:      Physical Exam   Constitutional: She is oriented to person, place, and time. She appears well-developed and well-nourished.   HENT:   Head: Normocephalic and atraumatic.   Right Ear: Hearing, tympanic membrane, external ear and ear canal normal.   Left Ear: Hearing, tympanic membrane, external ear and ear canal normal.   Nose: Nose normal. No mucosal edema, rhinorrhea or sinus tenderness. Right sinus exhibits no maxillary sinus tenderness and no frontal sinus tenderness. Left sinus exhibits no maxillary sinus  tenderness and no frontal sinus tenderness.   Mouth/Throat: Uvula is midline, oropharynx is clear and moist and mucous membranes are normal. No oropharyngeal exudate or posterior oropharyngeal edema.   Eyes: Pupils are equal, round, and reactive to light. Conjunctivae and EOM are normal.   Neck: Normal range of motion. Neck supple.   Cardiovascular: Normal rate, regular rhythm, normal heart sounds and intact distal pulses. Exam reveals no friction rub.   No murmur heard.  Pulmonary/Chest: Effort normal and breath sounds normal. No stridor. No respiratory distress. She has no wheezes. She has no rales.   Abdominal: Soft. Bowel sounds are normal.   Musculoskeletal: Normal range of motion. She exhibits no edema or tenderness.   Neurological: She is alert and oriented to person, place, and time. No cranial nerve deficit. Coordination normal.   Skin: Skin is warm and dry.   Psychiatric: She has a normal mood and affect. Her behavior is normal. Judgment and thought content normal.   Nursing note and vitals reviewed.      Assessment:       1. Nausea    2. Obesity due to excess calories without serious comorbidity, unspecified classification    3. Dizziness        Plan:       Nausea  -     CBC auto differential; Future; Expected date: 09/13/2019  -     Comprehensive metabolic panel; Future; Expected date: 09/13/2019  -     TSH; Future; Expected date: 09/13/2019  -     Lipid panel; Future; Expected date: 09/13/2019  -     ondansetron (ZOFRAN) 4 MG tablet; Take 1 tablet (4 mg total) by mouth every 8 (eight) hours as needed for Nausea.  Dispense: 20 tablet; Refill: 0    Obesity due to excess calories without serious comorbidity, unspecified classification  -     CBC auto differential; Future; Expected date: 09/13/2019  -     Comprehensive metabolic panel; Future; Expected date: 09/13/2019  -     TSH; Future; Expected date: 09/13/2019  -     Lipid panel; Future; Expected date: 09/13/2019    Dizziness  -     CBC auto  differential; Future; Expected date: 09/13/2019  -     Comprehensive metabolic panel; Future; Expected date: 09/13/2019  -     TSH; Future; Expected date: 09/13/2019  -     Lipid panel; Future; Expected date: 09/13/2019          Will call with results   Fu with PCP as directed            admission

## 2020-10-24 NOTE — PROGRESS NOTE ADULT - SUBJECTIVE AND OBJECTIVE BOX
VENTURA MOCK 75y Female  MRN#: [id number]     SUBJECTIVE  Patient is a 75y old Female who presents with a chief complaint of COPD exacerbation (23 Oct 2020 15:07)  Currently admitted to medicine with the primary diagnosis of COPD exacerbatios    Today is hospital day 1d, and this morning she is well. Off BIPAP, on 2L nasal canula. Comfortable    OBJECTIVE  PAST MEDICAL & SURGICAL HISTORY  Anxiety    HTN (hypertension)    COPD (chronic obstructive pulmonary disease)    History of hysterectomy    History of cholecystectomy      ALLERGIES:  Levaquin (Other)    MEDICATIONS:  STANDING MEDICATIONS  albuterol/ipratropium for Nebulization 3 milliLiter(s) Nebulizer every 6 hours  amLODIPine   Tablet 10 milliGRAM(s) Oral daily  budesonide  80 MICROgram(s)/formoterol 4.5 MICROgram(s) Inhaler 2 Puff(s) Inhalation two times a day  cefTRIAXone   IVPB 1000 milliGRAM(s) IV Intermittent every 24 hours  chlorhexidine 4% Liquid 1 Application(s) Topical <User Schedule>  enalapril 5 milliGRAM(s) Oral daily  enoxaparin Injectable 40 milliGRAM(s) SubCutaneous daily  lactated ringers. 1000 milliLiter(s) IV Continuous <Continuous>  methylPREDNISolone sodium succinate IVPB 60 milliGRAM(s) IV Intermittent every 8 hours  pantoprazole    Tablet 40 milliGRAM(s) Oral before breakfast  PARoxetine 10 milliGRAM(s) Oral daily  tiotropium 18 MICROgram(s) Capsule 1 Capsule(s) Inhalation daily    PRN MEDICATIONS  ALPRAZolam 0.25 milliGRAM(s) Oral two times a day PRN      VITAL SIGNS: Last 24 Hours  T(C): 35.9 (24 Oct 2020 07:28), Max: 36.4 (23 Oct 2020 15:28)  T(F): 96.7 (24 Oct 2020 07:28), Max: 97.6 (23 Oct 2020 15:28)  HR: 82 (24 Oct 2020 07:28) (80 - 98)  BP: 129/67 (24 Oct 2020 07:28) (110/60 - 143/83)  BP(mean): 84 (24 Oct 2020 03:15) (84 - 84)  RR: 20 (24 Oct 2020 07:28) (18 - 22)  SpO2: 98% (24 Oct 2020 06:08) (95% - 100%)    LABS:                        12.0   14.44 )-----------( 211      ( 24 Oct 2020 08:24 )             38.9     10-24    138  |  95<L>  |  19  ----------------------------<  128<H>  5.3<H>   |  31  |  0.9    Ca    9.5      24 Oct 2020 08:24  Mg     2.1     10-24    TPro  5.9<L>  /  Alb  4.0  /  TBili  0.3  /  DBili  x   /  AST  12  /  ALT  6   /  AlkPhos  69  10-24        ABG - ( 23 Oct 2020 16:13 )  pH, Arterial: 7.36  pH, Blood: x     /  pCO2: 66    /  pO2: 168   / HCO3: 38    / Base Excess: 9.7   /  SaO2: 99                    CARDIAC MARKERS ( 23 Oct 2020 10:59 )  x     / <0.01 ng/mL / x     / x     / x          RADIOLOGY:    PHYSICAL EXAM:    GENERAL: NAD, well-developed, AAOx3  HEENT:  Atraumatic, Normocephalic. EOMI, PERRLA, conjunctiva and sclera clear, No JVD  PULMONARY: Clear to auscultation bilaterally; Minimal expiratory wheezing.   CARDIOVASCULAR: Regular rate and rhythm; No murmurs, rubs, or gallops  GASTROINTESTINAL: Soft, Nontender, Nondistended; Bowel sounds present  MUSCULOSKELETAL:  2+ Peripheral Pulses, No clubbing, cyanosis, or edema  NEUROLOGY: non-focal  SKIN: No rashes or lesions      ASSESSMENT & PLAN  75 year old female with PMHx of COPD on 2L home O2, HTN, GERD and anxiety who presented due to worsening SOB of two days duration. Pulse ox of 80% this morning at home. Wheezing on physical exam, ptnt is on BIPAP. CXR negative and ABG with low pH and increased bicarb.  S/p duonebs, solumedrol, azithromycin and ceftriaxone in the ED.     #COPD exacerbation  -CXR negative for acute disease   -O2 sat 98% on 2L nasal canula  -BIPAP PRN  -solumedrol 60mg q8h IV. Switch to PO on dc  -C/w Duonebs q6h prn   -Started Symbicort q12 and Spiriva qd (resume home inhaler on discharge)  -C/w Rocephin 1g qd  -Monitor   -F/u pulm consult (dr. zhang)    #HTN: C/w home Amlodipine and Enalapril     #Anxiety: C/w home Xanax 0.25mg BID prn and Paxil 10mg qd     #GERD: C/w Protonix 40 mg qd     DVT ppx: Lovenox 40 QD  Activity: IAT   Diet : DASH  Dispo: from home  FULL CODE discussed with son   VENTURA MOCK 75y Female  MRN#: [id number]     SUBJECTIVE  Patient is a 75y old Female who presents with a chief complaint of COPD exacerbation (23 Oct 2020 15:07)  Currently admitted to medicine with the primary diagnosis of COPD exacerbatios    Today is hospital day 1d, and this morning she is well. Off BIPAP, on 2L nasal canula. Comfortable    OBJECTIVE  PAST MEDICAL & SURGICAL HISTORY  Anxiety    HTN (hypertension)    COPD (chronic obstructive pulmonary disease)    History of hysterectomy    History of cholecystectomy      ALLERGIES:  Levaquin (Other)    MEDICATIONS:  STANDING MEDICATIONS  albuterol/ipratropium for Nebulization 3 milliLiter(s) Nebulizer every 6 hours  amLODIPine   Tablet 10 milliGRAM(s) Oral daily  budesonide  80 MICROgram(s)/formoterol 4.5 MICROgram(s) Inhaler 2 Puff(s) Inhalation two times a day  cefTRIAXone   IVPB 1000 milliGRAM(s) IV Intermittent every 24 hours  chlorhexidine 4% Liquid 1 Application(s) Topical <User Schedule>  enalapril 5 milliGRAM(s) Oral daily  enoxaparin Injectable 40 milliGRAM(s) SubCutaneous daily  lactated ringers. 1000 milliLiter(s) IV Continuous <Continuous>  methylPREDNISolone sodium succinate IVPB 60 milliGRAM(s) IV Intermittent every 8 hours  pantoprazole    Tablet 40 milliGRAM(s) Oral before breakfast  PARoxetine 10 milliGRAM(s) Oral daily  tiotropium 18 MICROgram(s) Capsule 1 Capsule(s) Inhalation daily    PRN MEDICATIONS  ALPRAZolam 0.25 milliGRAM(s) Oral two times a day PRN      VITAL SIGNS: Last 24 Hours  T(C): 35.9 (24 Oct 2020 07:28), Max: 36.4 (23 Oct 2020 15:28)  T(F): 96.7 (24 Oct 2020 07:28), Max: 97.6 (23 Oct 2020 15:28)  HR: 82 (24 Oct 2020 07:28) (80 - 98)  BP: 129/67 (24 Oct 2020 07:28) (110/60 - 143/83)  BP(mean): 84 (24 Oct 2020 03:15) (84 - 84)  RR: 20 (24 Oct 2020 07:28) (18 - 22)  SpO2: 98% (24 Oct 2020 06:08) (95% - 100%)    LABS:                        12.0   14.44 )-----------( 211      ( 24 Oct 2020 08:24 )             38.9     10-24    138  |  95<L>  |  19  ----------------------------<  128<H>  5.3<H>   |  31  |  0.9    Ca    9.5      24 Oct 2020 08:24  Mg     2.1     10-24    TPro  5.9<L>  /  Alb  4.0  /  TBili  0.3  /  DBili  x   /  AST  12  /  ALT  6   /  AlkPhos  69  10-24        ABG - ( 23 Oct 2020 16:13 )  pH, Arterial: 7.36  pH, Blood: x     /  pCO2: 66    /  pO2: 168   / HCO3: 38    / Base Excess: 9.7   /  SaO2: 99                    CARDIAC MARKERS ( 23 Oct 2020 10:59 )  x     / <0.01 ng/mL / x     / x     / x          RADIOLOGY:    PHYSICAL EXAM:    GENERAL: NAD, well-developed, AAOx3  HEENT:  Atraumatic, Normocephalic. EOMI, PERRLA, conjunctiva and sclera clear, No JVD  PULMONARY: Clear to auscultation bilaterally; Minimal expiratory wheezing.   CARDIOVASCULAR: Regular rate and rhythm; No murmurs, rubs, or gallops  GASTROINTESTINAL: Soft, Nontender, Nondistended; Bowel sounds present  MUSCULOSKELETAL:  2+ Peripheral Pulses, No clubbing, cyanosis, or edema  NEUROLOGY: non-focal  SKIN: No rashes or lesions      ASSESSMENT & PLAN  75 year old female with PMHx of COPD on 2L home O2, HTN, GERD and anxiety who presented due to worsening SOB of two days duration. Pulse ox of 80% this morning at home. Wheezing on physical exam, ptnt is on BIPAP. CXR negative and ABG with low pH and increased bicarb.  S/p duonebs, solumedrol, azithromycin and ceftriaxone in the ED.     #COPD exacerbation  -CXR negative for acute disease   -O2 sat 98% on 2L nasal canula  -BIPAP PRN  -solumedrol 60mg q8h IV. Switch to PO on dc  -C/w Duonebs q6h prn   -Started Symbicort q12 and Spiriva qd (resume home inhaler on discharge)  -C/w Rocephin 1g qd  -Monitor   -F/u pulm consult (dr. zhang)    #HTN: C/w home Amlodipine and Enalapril     #Anxiety: C/w home Xanax 0.25mg BID prn and Paxil 10mg qd     #GERD: C/w Protonix 40 mg qd     DVT ppx: Lovenox 40 QD  Activity: IAT   Diet : DASH  Dispo: from home

## 2020-10-25 LAB
ANION GAP SERPL CALC-SCNC: 8 MMOL/L — SIGNIFICANT CHANGE UP (ref 7–14)
ANION GAP SERPL CALC-SCNC: 9 MMOL/L — SIGNIFICANT CHANGE UP (ref 7–14)
BUN SERPL-MCNC: 21 MG/DL — HIGH (ref 10–20)
BUN SERPL-MCNC: 26 MG/DL — HIGH (ref 10–20)
CALCIUM SERPL-MCNC: 10.1 MG/DL — SIGNIFICANT CHANGE UP (ref 8.5–10.1)
CALCIUM SERPL-MCNC: 9.6 MG/DL — SIGNIFICANT CHANGE UP (ref 8.5–10.1)
CHLORIDE SERPL-SCNC: 95 MMOL/L — LOW (ref 98–110)
CHLORIDE SERPL-SCNC: 97 MMOL/L — LOW (ref 98–110)
CO2 SERPL-SCNC: 38 MMOL/L — HIGH (ref 17–32)
CO2 SERPL-SCNC: 39 MMOL/L — HIGH (ref 17–32)
CREAT SERPL-MCNC: 0.9 MG/DL — SIGNIFICANT CHANGE UP (ref 0.7–1.5)
CREAT SERPL-MCNC: 1 MG/DL — SIGNIFICANT CHANGE UP (ref 0.7–1.5)
GLUCOSE SERPL-MCNC: 119 MG/DL — HIGH (ref 70–99)
GLUCOSE SERPL-MCNC: 138 MG/DL — HIGH (ref 70–99)
HCT VFR BLD CALC: 38.9 % — SIGNIFICANT CHANGE UP (ref 37–47)
HGB BLD-MCNC: 11.7 G/DL — LOW (ref 12–16)
MCHC RBC-ENTMCNC: 30 PG — SIGNIFICANT CHANGE UP (ref 27–31)
MCHC RBC-ENTMCNC: 30.1 G/DL — LOW (ref 32–37)
MCV RBC AUTO: 99.7 FL — HIGH (ref 81–99)
NRBC # BLD: 0 /100 WBCS — SIGNIFICANT CHANGE UP (ref 0–0)
PLATELET # BLD AUTO: 232 K/UL — SIGNIFICANT CHANGE UP (ref 130–400)
POTASSIUM SERPL-MCNC: 4.6 MMOL/L — SIGNIFICANT CHANGE UP (ref 3.5–5)
POTASSIUM SERPL-MCNC: 5.3 MMOL/L — HIGH (ref 3.5–5)
POTASSIUM SERPL-SCNC: 4.6 MMOL/L — SIGNIFICANT CHANGE UP (ref 3.5–5)
POTASSIUM SERPL-SCNC: 5.3 MMOL/L — HIGH (ref 3.5–5)
RBC # BLD: 3.9 M/UL — LOW (ref 4.2–5.4)
RBC # FLD: 13.3 % — SIGNIFICANT CHANGE UP (ref 11.5–14.5)
SODIUM SERPL-SCNC: 143 MMOL/L — SIGNIFICANT CHANGE UP (ref 135–146)
SODIUM SERPL-SCNC: 143 MMOL/L — SIGNIFICANT CHANGE UP (ref 135–146)
WBC # BLD: 15.26 K/UL — HIGH (ref 4.8–10.8)
WBC # FLD AUTO: 15.26 K/UL — HIGH (ref 4.8–10.8)

## 2020-10-25 PROCEDURE — 99221 1ST HOSP IP/OBS SF/LOW 40: CPT

## 2020-10-25 PROCEDURE — 99233 SBSQ HOSP IP/OBS HIGH 50: CPT

## 2020-10-25 RX ORDER — ALBUTEROL 90 UG/1
2.5 AEROSOL, METERED ORAL ONCE
Refills: 0 | Status: COMPLETED | OUTPATIENT
Start: 2020-10-25 | End: 2020-10-25

## 2020-10-25 RX ORDER — SODIUM ZIRCONIUM CYCLOSILICATE 10 G/10G
5 POWDER, FOR SUSPENSION ORAL ONCE
Refills: 0 | Status: COMPLETED | OUTPATIENT
Start: 2020-10-25 | End: 2020-10-25

## 2020-10-25 RX ADMIN — Medication 3 MILLILITER(S): at 19:00

## 2020-10-25 RX ADMIN — Medication 60 MILLIGRAM(S): at 05:41

## 2020-10-25 RX ADMIN — Medication 0.25 MILLIGRAM(S): at 18:30

## 2020-10-25 RX ADMIN — CEFTRIAXONE 100 MILLIGRAM(S): 500 INJECTION, POWDER, FOR SOLUTION INTRAMUSCULAR; INTRAVENOUS at 10:58

## 2020-10-25 RX ADMIN — Medication 650 MILLIGRAM(S): at 11:26

## 2020-10-25 RX ADMIN — Medication 3 MILLILITER(S): at 14:42

## 2020-10-25 RX ADMIN — ENOXAPARIN SODIUM 40 MILLIGRAM(S): 100 INJECTION SUBCUTANEOUS at 10:56

## 2020-10-25 RX ADMIN — Medication 650 MILLIGRAM(S): at 17:35

## 2020-10-25 RX ADMIN — AMLODIPINE BESYLATE 10 MILLIGRAM(S): 2.5 TABLET ORAL at 05:41

## 2020-10-25 RX ADMIN — Medication 5 MILLIGRAM(S): at 05:40

## 2020-10-25 RX ADMIN — SODIUM ZIRCONIUM CYCLOSILICATE 5 GRAM(S): 10 POWDER, FOR SUSPENSION ORAL at 10:56

## 2020-10-25 RX ADMIN — BUDESONIDE AND FORMOTEROL FUMARATE DIHYDRATE 2 PUFF(S): 160; 4.5 AEROSOL RESPIRATORY (INHALATION) at 21:14

## 2020-10-25 RX ADMIN — Medication 10 MILLIGRAM(S): at 10:56

## 2020-10-25 RX ADMIN — Medication 40 MILLIGRAM(S): at 17:34

## 2020-10-25 RX ADMIN — CHLORHEXIDINE GLUCONATE 1 APPLICATION(S): 213 SOLUTION TOPICAL at 05:41

## 2020-10-25 RX ADMIN — Medication 3 MILLILITER(S): at 08:52

## 2020-10-25 RX ADMIN — BUDESONIDE AND FORMOTEROL FUMARATE DIHYDRATE 2 PUFF(S): 160; 4.5 AEROSOL RESPIRATORY (INHALATION) at 08:53

## 2020-10-25 RX ADMIN — Medication 650 MILLIGRAM(S): at 03:33

## 2020-10-25 RX ADMIN — PANTOPRAZOLE SODIUM 40 MILLIGRAM(S): 20 TABLET, DELAYED RELEASE ORAL at 05:40

## 2020-10-25 RX ADMIN — Medication 650 MILLIGRAM(S): at 10:56

## 2020-10-25 RX ADMIN — Medication 650 MILLIGRAM(S): at 03:01

## 2020-10-25 NOTE — CONSULT NOTE ADULT - CONSULT REASON
Refer To    [facility]     IMAGING TESTS REFER TO:    [facility] [address] Ph: [phone]     Study to order: CT CHEST WO CONTRAST     Reason for Referral: Nicotine dependence (F17.200)    # of visits: 1     Signatures   Electronically signed by : Jessica Lewis CMA; Sep 22 2018 11:36AM CST    
SOB, COPD, Anxiety

## 2020-10-25 NOTE — PROGRESS NOTE ADULT - ASSESSMENT
75 year old female with PMHx of COPD on 2L home O2, HTN, GERD and anxiety who presented due to worsening SOB of two days duration. Pulse ox of 80% this morning at home. Wheezing on physical exam, ptnt is on BIPAP. CXR negative and ABG with low pH and increased bicarb.  S/p duonebs, solumedrol, azithromycin and ceftriaxone in the ED.     #COPD exacerbation  - CXR negative for acute disease   - O2 sat 98% on 2L nasal canula  - BIPAP PRN  - solumedrol 60mg q8h IV. Switch to PO on dc  - C/w Duonebs q6h prn   - Started Symbicort q12 and Spiriva qd (resume home inhaler on discharge)  - C/w Rocephin 1g qd.   - F/u pulm consult (dr. zhang)    #HTN: C/w home Amlodipine and Enalapril     #Anxiety: C/w home Xanax 0.25mg BID prn and Paxil 10mg qd     #GERD: C/w Protonix 40 mg qd     DVT ppx: Lovenox 40 QD  Activity: IAT   Diet : DASH  Dispo: from home   75 year old female with PMHx of COPD on 2L home O2, HTN, GERD and anxiety who presented due to worsening SOB of two days duration. Pulse ox of 80% this morning at home. Wheezing on physical exam, ptnt is on BIPAP. CXR negative and ABG with low pH and increased bicarb.  S/p duonebs, solumedrol, azithromycin and ceftriaxone in the ED.     #COPD exacerbation  - CXR negative for acute disease   - O2 sat 98% on 2L nasal canula  - BIPAP PRN  - decreased solumedrol to 40 mg q8h IV on 10/25. Switch to PO on dc  - C/w Duonebs q6h prn   - Started Symbicort q12 and Spiriva qd (resume home inhaler on discharge)  - C/w Rocephin 1g qd.   - F/u pulm consult (dr. zhang)    #HTN: C/w home Amlodipine and Enalapril     #Anxiety: C/w home Xanax 0.25mg BID prn and Paxil 10mg qd     #GERD: C/w Protonix 40 mg qd     DVT ppx: Lovenox 40 QD  Activity: IAT   Diet : DASH  Dispo: from home, Anticipated for d/c pending pulm consult

## 2020-10-25 NOTE — CONSULT NOTE ADULT - SUBJECTIVE AND OBJECTIVE BOX
VENTURA MOCK  75y  Female  Patient seen and examined. Chart reviewed and events noted.  HPI:  75 year old female with PMHx of COPD on 2L home O2, HTN, GERD and anxiety who presented due to worsening SOB of two days duration. Patient reports she checks her pulse ox daily and this morning upon checking it was 80%, she felt worsening SOB when seeing this, used her inhaler w/o improvement and called EMS. Per patent her pulse ox at baseline is 89-90% on 2L O2. Daughter-in-law at bedside reports someone came and checked oxygen machine today, reports it hasn't been working, unsure for how long and machinery was just replaced today.   Patient endorses nausea. Denies recent fevers, chills, chest pain, palpitations, abdominal pain.   Patient's pulmonologist is Dr. Robbins.     In the ED: /83, HR 90, afebrile, RR 20, O2 sat 97% on 2L NC. WBC 10.6. VBG: pH 7.23, PCO2 97, PO2 89, HCO3 41. Patient was placed on BIPAP, repeat ABG ~1 hr after BIPAP: pH 7.23, POCO2 96, PO2 73, HCO3 40. Patient received duonebs x1, proventil x1, solumedrol 125mg x1, Zofran 4mg x1,  azithromycin 500mg x1, ceftriaxone 1000mg x1. CXR negative for acute findings. Patient evaluated by ICU who determined she's not candidate for ICU admission at this time.   (23 Oct 2020 15:07)        PAST MEDICAL & SURGICAL HISTORY:  Anxiety  HTN (hypertension)  COPD (chronic obstructive pulmonary disease) Severe - on home O2  GERD with Hiatal hernia  History of hysterectomy  History of cholecystectomy      Allergies   Levaquin (Other)    FAMILY HISTORY:  Family history of congestive heart failure  Family history of COPD (chronic obstructive pulmonary disease)    SOCIAL HISTORY  Smoking History: Ex smoker  Alcohol: denied  Drugs: denied  Occupation:  Retired  MEDICATIONS  (STANDING):  albuterol/ipratropium for Nebulization 3 milliLiter(s) Nebulizer every 6 hours  amLODIPine   Tablet 10 milliGRAM(s) Oral daily  budesonide  80 MICROgram(s)/formoterol 4.5 MICROgram(s) Inhaler 2 Puff(s) Inhalation two times a day  cefTRIAXone   IVPB 1000 milliGRAM(s) IV Intermittent every 24 hours  chlorhexidine 4% Liquid 1 Application(s) Topical <User Schedule>  enalapril 5 milliGRAM(s) Oral daily  enoxaparin Injectable 40 milliGRAM(s) SubCutaneous daily  methylPREDNISolone sodium succinate Injectable 40 milliGRAM(s) IV Push every 12 hours  pantoprazole    Tablet 40 milliGRAM(s) Oral before breakfast  PARoxetine 10 milliGRAM(s) Oral daily  tiotropium 18 MICROgram(s) Capsule 1 Capsule(s) Inhalation daily  MEDICATIONS  (PRN):  acetaminophen   Tablet .. 650 milliGRAM(s) Oral every 6 hours PRN Mild Pain (1 - 3)  ALBUTerol    90 MICROgram(s) HFA Inhaler 2 Puff(s) Inhalation every 6 hours PRN Bronchospasm  ALPRAZolam 0.25 milliGRAM(s) Oral two times a day PRN anxiety    REVIEW OF SYSTEMS:  CONSTITUTIONAL: No fevers or chills. Chronic anxiety   HEENT: No visual disturbance or sore throat. Nasal congestion   NECK: No pain or stiffness  RESPIRATORY: SOB/MARS, cough and occasional sputum  CARDIOVASCULAR: No chest pain or palpitations  GASTROINTESTINAL:  No nausea, vomiting, or diarrhea. No abdominal pain.  GENITOURINARY: No dysuria, frequency or hematuria  NEUROLOGICAL: No numbness or headache  EXTREMITIES: No edema  No calf pain or tenderness    Vital Signs Last 24 Hrs  T(F): 97.3 (25 Oct 2020 15:34), Max: 97.9 (25 Oct 2020 07:30)  HR: 84 (25 Oct 2020 15:34) (83 - 94)  BP: 141/80 (25 Oct 2020 15:34) (111/66 - 171/77)  RR: 19 (25 Oct 2020 15:34) (15 - 22)  SpO2: 97% (25 Oct 2020 15:34) (94% - 100%) On O2 95%    PHYSICAL EXAM:  Constitutional: A A O x 3 NAD Freely speaking. O2 in use.  HEAD: PERRLA, No JVD, Atraumatic, Normocephalic  Neck: No neck pain  Respiratory: Decreased BS bilaterally   Cardiovascular: regular rate and rhythm  Gastrointestinal: Soft NT +BS  Extremities: No E/C/C  No calf pain or tenderness. Movement in all four extremities  Skin: No lesions    LABS:                        11.7   15.26 )-----------( 232      ( 25 Oct 2020 05:21 )             38.9     10-25    143  |  97<L>  |  26<H>  ----------------------------<  138<H>  5.3<H>   |  38<H>  |  1.0    Ca    9.6      25 Oct 2020 05:21  Mg     2.1     10-24  TPro  5.9<L>  /  Alb  4.0  /  TBili  0.3  /  DBili  x   /  AST  12  /  ALT  6   /  AlkPhos  69  10-24  Serum Pro-Brain Natriuretic Peptide: 202 pg/mL (10-23-20 @ 10:59)    ABG - ( 23 Oct 2020 16:13 )  pH: 7.36  /  pCO2: 66    /  pO2: 168   / HCO3: 38    / Base Excess: 9.7   /  SaO2: 99        RADIOLOGY:  Chest X-Ray: Chronic changes. Scarring with pleural thickening     ASSESSMENT:  Chronic SOB & hypoxia  O2 dependant  Severe COPD H/O tobacco use  Scarring with pleural thickening   Allergic Rhinitis  Anxiety  GERD with Hiatal hernia    PLAN:  O2 at 2 liters. Keep Sat at 92%  Bronchodilator Rx  Change PO Prednisone 60mg with 10 day antoinette  GI  / DVT prophylaxis   Check K+ levels  Anxiety control   Nasal saline wash  Flu shot prior to D/C  Out patient follow up  THANK YOU

## 2020-10-25 NOTE — PROGRESS NOTE ADULT - SUBJECTIVE AND OBJECTIVE BOX
SUBJECTIVE:    Patient is a 75y old Female who presents with a chief complaint of COPD exacerbation (24 Oct 2020 11:42)    Currently admitted to medicine with the primary diagnosis of COPD exacerbation    Today is hospital day 2d. This morning she is resting comfortably in bed and reports no new issues or overnight events. Pt is just anxious, spO2 95% on 3L NC    PAST MEDICAL & SURGICAL HISTORY  Anxiety    HTN (hypertension)    COPD (chronic obstructive pulmonary disease)    History of hysterectomy    History of cholecystectomy      SOCIAL HISTORY:  Negative for smoking/alcohol/drug use.     ALLERGIES:  Levaquin (Other)    MEDICATIONS:  STANDING MEDICATIONS  albuterol/ipratropium for Nebulization 3 milliLiter(s) Nebulizer every 6 hours  amLODIPine   Tablet 10 milliGRAM(s) Oral daily  budesonide  80 MICROgram(s)/formoterol 4.5 MICROgram(s) Inhaler 2 Puff(s) Inhalation two times a day  cefTRIAXone   IVPB 1000 milliGRAM(s) IV Intermittent every 24 hours  chlorhexidine 4% Liquid 1 Application(s) Topical <User Schedule>  enalapril 5 milliGRAM(s) Oral daily  enoxaparin Injectable 40 milliGRAM(s) SubCutaneous daily  methylPREDNISolone sodium succinate Injectable 60 milliGRAM(s) IV Push every 8 hours  pantoprazole    Tablet 40 milliGRAM(s) Oral before breakfast  PARoxetine 10 milliGRAM(s) Oral daily  tiotropium 18 MICROgram(s) Capsule 1 Capsule(s) Inhalation daily    PRN MEDICATIONS  acetaminophen   Tablet .. 650 milliGRAM(s) Oral every 6 hours PRN  ALBUTerol    90 MICROgram(s) HFA Inhaler 2 Puff(s) Inhalation every 6 hours PRN  ALPRAZolam 0.25 milliGRAM(s) Oral two times a day PRN    VITALS:   T(F): 97.9  HR: 84  BP: 137/74  RR: 22  SpO2: 95%    LABS:                        11.7   15.26 )-----------( 232      ( 25 Oct 2020 05:21 )             38.9     10-25    143  |  97<L>  |  26<H>  ----------------------------<  138<H>  5.3<H>   |  38<H>  |  1.0    Ca    9.6      25 Oct 2020 05:21  Mg     2.1     10-24    TPro  5.9<L>  /  Alb  4.0  /  TBili  0.3  /  DBili  x   /  AST  12  /  ALT  6   /  AlkPhos  69  10-24        ABG - ( 23 Oct 2020 16:13 )  pH, Arterial: 7.36  pH, Blood: x     /  pCO2: 66    /  pO2: 168   / HCO3: 38    / Base Excess: 9.7   /  SaO2: 99                    CARDIAC MARKERS ( 23 Oct 2020 10:59 )  x     / <0.01 ng/mL / x     / x     / x            PHYSICAL EXAM:  GEN: No acute distress  LUNGS: Clear to auscultation bilaterally   HEART: S1& S2 present, no murmurs  ABD: Soft, non-tender, non-distended.  EXT: No edema  NEURO: AAOX3

## 2020-10-25 NOTE — PROGRESS NOTE ADULT - ATTENDING COMMENTS
ASSESSMENT & PLAN:    Rocephin 1gm IVPB daily  O2 via NC@ 2L/Min, keep sat >91% at all times- on home oxygen 2-3 L  Solumedrol - reduce dose to 40mg IVPB Q12hrs, switch to prednisone in AM  Albuterol/Atrovent 1 unit neb Q6hrs  Budesonide every 12 hrs   Tylenol 650mg po q6hrs PRN for fever/mild pain  Hypertension -well controlled  pulm consult-Dr Robbins- awaited  DVT Prophylaxis with Heparin 5000units sc q8hrs  Discharge Disposition: Anticipated for discharge in AM after pulm consult

## 2020-10-26 LAB
ANION GAP SERPL CALC-SCNC: 13 MMOL/L — SIGNIFICANT CHANGE UP (ref 7–14)
BASOPHILS # BLD AUTO: 0.01 K/UL — SIGNIFICANT CHANGE UP (ref 0–0.2)
BASOPHILS NFR BLD AUTO: 0.1 % — SIGNIFICANT CHANGE UP (ref 0–1)
BUN SERPL-MCNC: 21 MG/DL — HIGH (ref 10–20)
CALCIUM SERPL-MCNC: 10 MG/DL — SIGNIFICANT CHANGE UP (ref 8.5–10.1)
CHLORIDE SERPL-SCNC: 94 MMOL/L — LOW (ref 98–110)
CO2 SERPL-SCNC: 36 MMOL/L — HIGH (ref 17–32)
CREAT SERPL-MCNC: 0.8 MG/DL — SIGNIFICANT CHANGE UP (ref 0.7–1.5)
EOSINOPHIL # BLD AUTO: 0 K/UL — SIGNIFICANT CHANGE UP (ref 0–0.7)
EOSINOPHIL NFR BLD AUTO: 0 % — SIGNIFICANT CHANGE UP (ref 0–8)
GLUCOSE SERPL-MCNC: 105 MG/DL — HIGH (ref 70–99)
HCT VFR BLD CALC: 44.2 % — SIGNIFICANT CHANGE UP (ref 37–47)
HGB BLD-MCNC: 13.4 G/DL — SIGNIFICANT CHANGE UP (ref 12–16)
IMM GRANULOCYTES NFR BLD AUTO: 0.5 % — HIGH (ref 0.1–0.3)
LYMPHOCYTES # BLD AUTO: 1.19 K/UL — LOW (ref 1.2–3.4)
LYMPHOCYTES # BLD AUTO: 6.9 % — LOW (ref 20.5–51.1)
MAGNESIUM SERPL-MCNC: 2.4 MG/DL — SIGNIFICANT CHANGE UP (ref 1.8–2.4)
MCHC RBC-ENTMCNC: 30.3 G/DL — LOW (ref 32–37)
MCHC RBC-ENTMCNC: 30.6 PG — SIGNIFICANT CHANGE UP (ref 27–31)
MCV RBC AUTO: 100.9 FL — HIGH (ref 81–99)
MONOCYTES # BLD AUTO: 1.6 K/UL — HIGH (ref 0.1–0.6)
MONOCYTES NFR BLD AUTO: 9.2 % — SIGNIFICANT CHANGE UP (ref 1.7–9.3)
NEUTROPHILS # BLD AUTO: 14.41 K/UL — HIGH (ref 1.4–6.5)
NEUTROPHILS NFR BLD AUTO: 83.3 % — HIGH (ref 42.2–75.2)
NRBC # BLD: 0 /100 WBCS — SIGNIFICANT CHANGE UP (ref 0–0)
PLATELET # BLD AUTO: 245 K/UL — SIGNIFICANT CHANGE UP (ref 130–400)
POTASSIUM SERPL-MCNC: 5.3 MMOL/L — HIGH (ref 3.5–5)
POTASSIUM SERPL-SCNC: 5.3 MMOL/L — HIGH (ref 3.5–5)
RBC # BLD: 4.38 M/UL — SIGNIFICANT CHANGE UP (ref 4.2–5.4)
RBC # FLD: 13.2 % — SIGNIFICANT CHANGE UP (ref 11.5–14.5)
SODIUM SERPL-SCNC: 143 MMOL/L — SIGNIFICANT CHANGE UP (ref 135–146)
WBC # BLD: 17.3 K/UL — HIGH (ref 4.8–10.8)
WBC # FLD AUTO: 17.3 K/UL — HIGH (ref 4.8–10.8)

## 2020-10-26 PROCEDURE — 99233 SBSQ HOSP IP/OBS HIGH 50: CPT

## 2020-10-26 RX ORDER — BUDESONIDE AND FORMOTEROL FUMARATE DIHYDRATE 160; 4.5 UG/1; UG/1
2 AEROSOL RESPIRATORY (INHALATION)
Refills: 0 | Status: DISCONTINUED | OUTPATIENT
Start: 2020-10-26 | End: 2020-10-27

## 2020-10-26 RX ORDER — CEFPODOXIME PROXETIL 100 MG
200 TABLET ORAL EVERY 12 HOURS
Refills: 0 | Status: DISCONTINUED | OUTPATIENT
Start: 2020-10-26 | End: 2020-10-27

## 2020-10-26 RX ADMIN — Medication 3 MILLILITER(S): at 08:12

## 2020-10-26 RX ADMIN — Medication 3 MILLILITER(S): at 20:06

## 2020-10-26 RX ADMIN — Medication 5 MILLIGRAM(S): at 05:14

## 2020-10-26 RX ADMIN — Medication 0.25 MILLIGRAM(S): at 11:03

## 2020-10-26 RX ADMIN — PANTOPRAZOLE SODIUM 40 MILLIGRAM(S): 20 TABLET, DELAYED RELEASE ORAL at 05:14

## 2020-10-26 RX ADMIN — Medication 3 MILLILITER(S): at 01:43

## 2020-10-26 RX ADMIN — Medication 40 MILLIGRAM(S): at 05:14

## 2020-10-26 RX ADMIN — Medication 0.25 MILLIGRAM(S): at 17:51

## 2020-10-26 RX ADMIN — TIOTROPIUM BROMIDE 1 CAPSULE(S): 18 CAPSULE ORAL; RESPIRATORY (INHALATION) at 08:15

## 2020-10-26 RX ADMIN — Medication 10 MILLIGRAM(S): at 11:04

## 2020-10-26 RX ADMIN — CHLORHEXIDINE GLUCONATE 1 APPLICATION(S): 213 SOLUTION TOPICAL at 05:14

## 2020-10-26 RX ADMIN — Medication 650 MILLIGRAM(S): at 06:13

## 2020-10-26 RX ADMIN — ENOXAPARIN SODIUM 40 MILLIGRAM(S): 100 INJECTION SUBCUTANEOUS at 11:05

## 2020-10-26 RX ADMIN — Medication 3 MILLILITER(S): at 14:35

## 2020-10-26 RX ADMIN — Medication 200 MILLIGRAM(S): at 17:51

## 2020-10-26 RX ADMIN — BUDESONIDE AND FORMOTEROL FUMARATE DIHYDRATE 2 PUFF(S): 160; 4.5 AEROSOL RESPIRATORY (INHALATION) at 08:33

## 2020-10-26 RX ADMIN — AMLODIPINE BESYLATE 10 MILLIGRAM(S): 2.5 TABLET ORAL at 05:13

## 2020-10-26 NOTE — PROGRESS NOTE ADULT - ATTENDING COMMENTS
Patient was seen independently. Latest vital signs and labs were reviewed. Case was discussed with housestaff. Agree with resident's assessment and plan with following additions/modifications.     ASSESSMENT & PLAN:    Rocephin 1gm IVPB daily while inpatient  O2 via NC@ 2L/Min, keep sat >91% at all times- on home oxygen 2-3 L  Solumedrol - switched to prednisone today. 10 day taper on discharge  Albuterol/Atrovent 1 unit neb Q6hrs  Budesonide every 12 hrs   Tylenol 650mg po q6hrs PRN for fever/mild pain  Hypertension -well controlled  pulm consult-Dr Robbins- appreciated  Flu shot prior to D/C  DVT Prophylaxis with Heparin 5000units sc q8hrs  Discharge Disposition: Anticipated for discharge tomorrow. No one available at home today.

## 2020-10-26 NOTE — PROGRESS NOTE ADULT - ASSESSMENT
This is a 75 year old female who presented with worsening SOB after O2 concentrator failed at home, resulting in the development of acute-chronic hypercapnic respiratory failure with hypoxia, complicated by acute anxiety attack.    ASSESSMENT:  Acute-chronic hypercapnic respiratory failure with hypoxia  COPD Exacerbation  Acute anxiety attack    PLAN:  ·	Switching to PO Prednisone with prolonged antoinette tomorrow  ·	Switch to PO Vantin from Rocephin for 2 more days  ·	Comfortable in bed with mild SOB while talking for prolonged period, but self-reports close to baseline  ·	No longer requiring BiPAP; Monitor for CO2 retention  ·	Plan for discharge tomorrow;  by son before noon    DVT PPx: Continue LOvenox  GI PPx: Protonix  Diet: DASH  Activity: As tolerated    Disposition: Discharge to home tomorrow anticipated. This is a 75 year old female who presented with worsening SOB after O2 concentrator failed at home, resulting in the development of acute-chronic hypercapnic respiratory failure with hypoxia, complicated by acute anxiety attack.    ASSESSMENT:  Acute-chronic hypercapnic respiratory failure with hypoxia  COPD Exacerbation  Acute anxiety attack    PLAN:  ·	Switching to PO Prednisone with prolonged antoinette tomorrow  ·	Switch to PO Vantin from Rocephin for 2 more days  ·	Comfortable in bed with mild SOB while talking for prolonged period, but self-reports close to baseline  ·	No longer requiring BiPAP; Monitor for signs/symptoms of CO2 retention  ·	Plan for discharge tomorrow;  by son before noon    DVT PPx: Continue Lovenox  GI PPx: Protonix  Diet: DASH  Activity: As tolerated    Disposition: Discharge to home tomorrow anticipated. This is a 75 year old female who presented with worsening SOB after O2 concentrator failed at home, resulting in the development of acute-chronic hypercapnic respiratory failure with hypoxia, complicated by acute anxiety attack.    ASSESSMENT:  Acute-chronic hypercapnic respiratory failure with hypoxia  COPD Exacerbation  Mild Hyperkalemia  Acute anxiety attack    PLAN:  ·	Switching to PO Prednisone with prolonged antoinette tomorrow  ·	Switch to PO Vantin from Rocephin for 2 more days  ·	Comfortable in bed with mild SOB while talking for prolonged period, but self-reports close to baseline  ·	No longer requiring BiPAP; Monitor for signs/symptoms of CO2 retention  ·	Holding ACE-I as likely cause for hyperkalemia; Consider adding change in medication in hypertensive; AM BMP  ·	Plan for discharge tomorrow;  by son before noon    DVT PPx: Continue Lovenox  GI PPx: Protonix  Diet: DASH; Low K+  Activity: As tolerated    DNR/DNI    Disposition: Discharge to home tomorrow anticipated.

## 2020-10-26 NOTE — PROGRESS NOTE ADULT - SUBJECTIVE AND OBJECTIVE BOX
DAILY PROGRESS NOTE  ===========================================================    Patient Information:  VENTURA MOCK  /  75y  /  Female  /  MRN#: [id number]    Hospital Day: 3d     |:::::::::::::::::::::::::::| SUBJECTIVE |:::::::::::::::::::::::::::|    OVERNIGHT EVENTS: None  TODAY: Patient was seen today at bedside. She reports feeling well today, but not 100% at baseline. She was anticipated for discharge today, however, notes that her family cannot take her home today. She requested to be taken home tomorrow with pickup from her son susan 11AM-12PM. Review of systems is otherwise negative.    |:::::::::::::::::::::::::::| OBJECTIVE |:::::::::::::::::::::::::::|    VITAL SIGNS: Last 24 Hours  T(C): 36.6 (26 Oct 2020 07:30), Max: 36.6 (26 Oct 2020 07:30)  T(F): 97.8 (26 Oct 2020 07:30), Max: 97.8 (26 Oct 2020 07:30)  HR: 81 (26 Oct 2020 07:30) (81 - 91)  BP: 122/74 (26 Oct 2020 07:30) (122/74 - 161/73)  RR: 20 (26 Oct 2020 07:30) (19 - 22)  SpO2: 92% (26 Oct 2020 00:00) (92% - 97%)    10-25-20 @ 07:01  -  10-26-20 @ 07:00  --------------------------------------------------------  IN: 720 mL / OUT: 1775 mL / NET: -1055 mL    PHYSICAL EXAM:  GENERAL:   Awake, alert; NAD; Comfortable on O2 but SOB with continued talking.  HEENT:  Head NC/AT; Conjunctivae pink, Sclera anicteric; Oral mucosa moist.  CARDIO:   Regular rate; Regular rhythm; S1 & S2.  RESP:   Mild expiratory wheezing (L>R).  GI:   Soft; NT/ND; BS; No guarding; No rebound tenderness; Barrel shaped chest.  EXT:   Strength UE 5/5; Strength LE 5/5.  SKIN:   Intact.    LAB RESULTS:                        13.4   17.30 )-----------( 245      ( 26 Oct 2020 04:30 )             44.2     143  |  94<L>  |  21<H>  ----------------------------<  105<H>  5.3<H>   |  36<H>  |  0.8    Ca    10.0      26 Oct 2020 04:30  Mg     2.4     10-26    MICROBIOLOGY:  None    RADIOLOGY:  Reviewed    ALLERGIES:  Levaquin (Other)      ===========================================================

## 2020-10-26 NOTE — PHARMACOTHERAPY INTERVENTION NOTE - COMMENTS
Counseled the patient on COPD medications. Spoke with patient and she claims that her inhaler has changed from Incruse Ellipta to Anoro Ellipta due to dislike of inhaler. Patient states that Dr. Robbins recently switched her to Anoro Ellipta. Patient had her Symbicort and ventolin inhaler and properly demonstrated how to use each inhaler. Called [facility] pharmacy for med rec and stated that there was no inhaler on file besides a script for Anoro written two years but never was picked up. Patient states that there are no financial issues for picking up inhalers and was able to teach back on side effects discussed.

## 2020-10-27 ENCOUNTER — TRANSCRIPTION ENCOUNTER (OUTPATIENT)
Age: 75
End: 2020-10-27

## 2020-10-27 ENCOUNTER — NON-APPOINTMENT (OUTPATIENT)
Age: 75
End: 2020-10-27

## 2020-10-27 VITALS — DIASTOLIC BLOOD PRESSURE: 96 MMHG | HEART RATE: 94 BPM | SYSTOLIC BLOOD PRESSURE: 145 MMHG

## 2020-10-27 LAB
ANION GAP SERPL CALC-SCNC: 10 MMOL/L — SIGNIFICANT CHANGE UP (ref 7–14)
BUN SERPL-MCNC: 19 MG/DL — SIGNIFICANT CHANGE UP (ref 10–20)
CALCIUM SERPL-MCNC: 9.6 MG/DL — SIGNIFICANT CHANGE UP (ref 8.5–10.1)
CHLORIDE SERPL-SCNC: 93 MMOL/L — LOW (ref 98–110)
CO2 SERPL-SCNC: 36 MMOL/L — HIGH (ref 17–32)
CREAT SERPL-MCNC: 0.8 MG/DL — SIGNIFICANT CHANGE UP (ref 0.7–1.5)
GLUCOSE SERPL-MCNC: 113 MG/DL — HIGH (ref 70–99)
POTASSIUM SERPL-MCNC: 4.5 MMOL/L — SIGNIFICANT CHANGE UP (ref 3.5–5)
POTASSIUM SERPL-SCNC: 4.5 MMOL/L — SIGNIFICANT CHANGE UP (ref 3.5–5)
SODIUM SERPL-SCNC: 139 MMOL/L — SIGNIFICANT CHANGE UP (ref 135–146)

## 2020-10-27 PROCEDURE — 99239 HOSP IP/OBS DSCHRG MGMT >30: CPT

## 2020-10-27 RX ORDER — PANTOPRAZOLE SODIUM 20 MG/1
1 TABLET, DELAYED RELEASE ORAL
Qty: 9 | Refills: 0
Start: 2020-10-27 | End: 2020-11-04

## 2020-10-27 RX ORDER — BUDESONIDE AND FORMOTEROL FUMARATE DIHYDRATE 160; 4.5 UG/1; UG/1
2 AEROSOL RESPIRATORY (INHALATION)
Qty: 1 | Refills: 3
Start: 2020-10-27 | End: 2020-12-25

## 2020-10-27 RX ORDER — LISINOPRIL 2.5 MG/1
5 TABLET ORAL DAILY
Refills: 0 | Status: DISCONTINUED | OUTPATIENT
Start: 2020-10-27 | End: 2020-10-27

## 2020-10-27 RX ADMIN — Medication 3 MILLILITER(S): at 08:44

## 2020-10-27 RX ADMIN — TIOTROPIUM BROMIDE 1 CAPSULE(S): 18 CAPSULE ORAL; RESPIRATORY (INHALATION) at 08:45

## 2020-10-27 RX ADMIN — AMLODIPINE BESYLATE 10 MILLIGRAM(S): 2.5 TABLET ORAL at 05:29

## 2020-10-27 RX ADMIN — CHLORHEXIDINE GLUCONATE 1 APPLICATION(S): 213 SOLUTION TOPICAL at 05:29

## 2020-10-27 RX ADMIN — ENOXAPARIN SODIUM 40 MILLIGRAM(S): 100 INJECTION SUBCUTANEOUS at 11:14

## 2020-10-27 RX ADMIN — Medication 0.25 MILLIGRAM(S): at 09:49

## 2020-10-27 RX ADMIN — Medication 200 MILLIGRAM(S): at 05:29

## 2020-10-27 RX ADMIN — Medication 60 MILLIGRAM(S): at 05:29

## 2020-10-27 RX ADMIN — BUDESONIDE AND FORMOTEROL FUMARATE DIHYDRATE 2 PUFF(S): 160; 4.5 AEROSOL RESPIRATORY (INHALATION) at 08:00

## 2020-10-27 RX ADMIN — PANTOPRAZOLE SODIUM 40 MILLIGRAM(S): 20 TABLET, DELAYED RELEASE ORAL at 05:29

## 2020-10-27 RX ADMIN — LISINOPRIL 5 MILLIGRAM(S): 2.5 TABLET ORAL at 10:11

## 2020-10-27 RX ADMIN — Medication 10 MILLIGRAM(S): at 11:14

## 2020-10-27 NOTE — DISCHARGE NOTE PROVIDER - HOSPITAL COURSE
This is a 75 year old female who presented with worsening SOB after O2 concentrator failed at home, resulting in the development of acute-chronic hypercapnic respiratory failure with hypoxia, complicated by acute anxiety attack. The patient received IV steroid, Abx, and Nebulizer treatments along with bipap; Her symptoms improved significantly. She is being discharged with follow up with her PCP and Pulmonologist.    ASSESSMENT:  Acute-chronic hypercapnic respiratory failure with hypoxia  COPD Exacerbation  Mild Hyperkalemia  Acute anxiety attack This is a 75 year old female who presented with worsening SOB after O2 concentrator failed at home, resulting in the development of acute-chronic hypercapnic respiratory failure with hypoxia, complicated by acute anxiety attack. The patient received IV steroid, Abx, and Nebulizer treatments along with bipap; Her symptoms improved significantly. She is being discharged with follow up with her PCP and Pulmonologist.    ASSESSMENT:  Acute-chronic hypercapnic respiratory failure with hypoxia  COPD Exacerbation  Mild Hyperkalemia  Acute anxiety attack      	Attending Attestation:  	Patient was seen & examined independently. At least 10 systems were reviewed in ROS. All systems reviewed  are within normal limits. Latest vital signs and labs were reviewed today. Case was discussed with house 		staff in morning rounds for assessment and plan.  Patient is medically stable for discharge . About 32 mins spent on discharge disposition.

## 2020-10-27 NOTE — DISCHARGE NOTE PROVIDER - NSDCCPCAREPLAN_GEN_ALL_CORE_FT
PRINCIPAL DISCHARGE DIAGNOSIS  Diagnosis: COPD exacerbation  Assessment and Plan of Treatment: You presented with symptoms consistent with COPD exacerbation. This was likely due to your O2 concentrator issue. Please continue using your inhalers daily. You will also need to complete a slow taper of oral steroids. Please follow up with your primary care doctor and pulmonologist within 1 week of discharge.      SECONDARY DISCHARGE DIAGNOSES  Diagnosis: Anxiety  Assessment and Plan of Treatment: Please continue to take your prescribed medications. Uncontrolled attacks may results in worsening of your COPD symptoms.    Diagnosis: Hyperkalemia  Assessment and Plan of Treatment: You developed a high potassium during this admission. This may be due to one of your blood pressure medications. Your potassium improved when holding this medication. Please discuss this with your primary care doctor and schedule a repeat lab draw (blood work) to assure the level remains within normal limit. You should also avoid foods high in potassium such as raisins, banannas, potatoes, etc.    Diagnosis: Acute and chronic respiratory failure (acute-on-chronic)  Assessment and Plan of Treatment: Your chronic respiratory disease was exacerbated, resulting in worsening of your breathing. Please follow up with the above recommendations.

## 2020-10-27 NOTE — DISCHARGE NOTE PROVIDER - CARE PROVIDERS DIRECT ADDRESSES
,DirectAddress_Unknown,[email or web address]

## 2020-10-27 NOTE — DISCHARGE NOTE PROVIDER - PROVIDER TOKENS
PROVIDER:[TOKEN:[[id number]]],PROVIDER:[TOKEN:[[id number]]] PROVIDER:[TOKEN:[[id number]]],PROVIDER:[TOKEN:[[id number]]]

## 2020-10-27 NOTE — DISCHARGE NOTE PROVIDER - NSDCMRMEDTOKEN_GEN_ALL_CORE_FT
albuterol 2.5 mg/3 mL (0.083%) inhalation solution: 3 milliliter(s) inhaled every 6 hours  ALPRAZolam 0.25 mg oral tablet: 1 tab(s) orally 2 times a day, As needed, anxiety  amLODIPine 10 mg oral tablet: 1 tab(s) orally once a day  enalapril 5 mg oral tablet: 1 tab(s) orally once a day  Incruse Ellipta 62.5 mcg/inh inhalation powder: 1 puff(s) inhaled once a day  Paxil 10 mg oral tablet: 1 tab(s) orally once a day  ProAir HFA 90 mcg/inh inhalation aerosol: 2 puff(s) inhaled 4 times a day   albuterol 2.5 mg/3 mL (0.083%) inhalation solution: 3 milliliter(s) inhaled every 6 hours  ALPRAZolam 0.25 mg oral tablet: 1 tab(s) orally 2 times a day, As needed, anxiety  amLODIPine 10 mg oral tablet: 1 tab(s) orally once a day  budesonide-formoterol 160 mcg-4.5 mcg/inh inhalation aerosol: 2 puff(s) inhaled every 12 hours   enalapril 5 mg oral tablet: 1 tab(s) orally once a day  Incruse Ellipta 62.5 mcg/inh inhalation powder: 1 puff(s) inhaled once a day  pantoprazole 40 mg oral delayed release tablet: 1 tab(s) orally once a day (before a meal)  Paxil 10 mg oral tablet: 1 tab(s) orally once a day  predniSONE 20 mg oral tablet: Take 60mg (3 tabs) x2 days; Then 40mg (2 tabs) x3 days; Then 20mg (1 tab) x3 days  ProAir HFA 90 mcg/inh inhalation aerosol: 2 puff(s) inhaled 4 times a day

## 2020-10-27 NOTE — DISCHARGE NOTE PROVIDER - CARE PROVIDER_API CALL
Demarcus Franks  INTERNAL MEDICINE  [address] [place] [address]  Phone: [phone]  Fax: [phone]  Follow Up Time:     Marlon Robbins)  Medicine  Critical Care  [address] [place] [address]  Phone: [phone]  Fax: [phone]  Follow Up Time:    Marlon Robbins)  Medicine  Critical Care  [address] [place] [address]  Phone: [phone]  Fax: [phone]  Follow Up Time:     Jennifer Jerry  [address] [address]  Phone: [phone]  Fax: [phone]  Follow Up Time:

## 2020-10-27 NOTE — DISCHARGE NOTE NURSING/CASE MANAGEMENT/SOCIAL WORK - PATIENT PORTAL LINK FT
You can access the FollowMyHealth Patient Portal offered by [facility] by registering at the following website: [email or web address]. By joining [facility]’s FollowMyHealth portal, you will also be able to view your health information using other applications (apps) compatible with our system.

## 2020-10-28 ENCOUNTER — NON-APPOINTMENT (OUTPATIENT)
Age: 75
End: 2020-10-28

## 2020-10-28 ENCOUNTER — APPOINTMENT (OUTPATIENT)
Dept: CARE COORDINATION | Facility: HOME HEALTH | Age: 75
End: 2020-10-28
Payer: MEDICARE

## 2020-10-28 PROCEDURE — 99348 HOME/RES VST EST LOW MDM 30: CPT | Mod: 95

## 2020-10-28 RX ORDER — RANITIDINE 300 MG/1
300 TABLET ORAL DAILY
Refills: 0 | Status: DISCONTINUED | COMMUNITY
Start: 2019-05-29 | End: 2020-10-28

## 2020-10-28 RX ORDER — SODIUM POLYSTYRENE SULFONATE 15 G/60ML
15 SUSPENSION ORAL; RECTAL
Refills: 0 | Status: DISCONTINUED | COMMUNITY
Start: 2019-05-29 | End: 2020-10-28

## 2020-10-28 RX ORDER — UMECLIDINIUM 62.5 UG/1
62.5 AEROSOL, POWDER ORAL DAILY
Refills: 0 | Status: DISCONTINUED | COMMUNITY
Start: 2019-05-29 | End: 2020-10-28

## 2020-10-28 RX ORDER — ENALAPRIL MALEATE 5 MG/1
5 TABLET ORAL DAILY
Refills: 0 | Status: ACTIVE | COMMUNITY

## 2020-10-28 RX ORDER — PANTOPRAZOLE SODIUM 40 MG/1
40 GRANULE, DELAYED RELEASE ORAL
Refills: 0 | Status: ACTIVE | COMMUNITY

## 2020-10-29 ENCOUNTER — NON-APPOINTMENT (OUTPATIENT)
Age: 75
End: 2020-10-29

## 2020-10-29 NOTE — COUNSELING
[de-identified] : Pt was informed about CN’s role/ STARS program and overview of transitional care reviewed with patient. Pt educated on topics of importance such as compliance with prescribed medication regimen, COPD action plan and escalation process, adequate hydration, and proper diet. Pt encouraged calling CN with any issues, concerns or questions, also educated to notify CN if experiencing CP, SOB, cough, increased mucus production, increased use of rescue inhaler, fever, chills, fatigue, “chest cold symptoms” dizziness, lightheadedness, n/v/d/c, swelling to extremities and/or any signs of COPD exacerbation/flare as reviewed. Reassurance provided. Will continue to monitor\par \par

## 2020-10-29 NOTE — PHYSICAL EXAM
[No Acute Distress] : no acute distress [No Respiratory Distress] : no respiratory distress  [No Accessory Muscle Use] : no accessory muscle use [No Edema] : there was no peripheral edema [Non-distended] : non-distended [Alert and Oriented x3] : oriented to person, place, and time [Normal Insight/Judgement] : insight and judgment were intact [de-identified] : observed via tele health

## 2020-10-29 NOTE — HISTORY OF PRESENT ILLNESS
[Home] : at home, [unfilled] , at the time of the visit. [Other Location: e.g. Home (Enter Location, City,State)___] : at [unfilled] [Family Member] : family member [Verbal consent obtained from patient] : the patient, [unfilled] [FreeTextEntry1] : follow up post hospitlzation COPD [de-identified] : patient is a 74 y/o female enrolled in the STARS program s/p a recent hospitlzation for COPD exacerbation, she has a PMH of COPD (on home 02), HTN and anxiety , she was treated with steroids and improved clinically. Patient was found stable for dc to home with out HCS, patient resides with son , observed via tele health alert , denies chest pain, sob, cough, fever, NVDC and palpiattions. Pulmonary follow up in place.

## 2020-10-29 NOTE — PLAN
[FreeTextEntry1] : COPD-c/w steroid taper, 02 supplementation , anoro Elipta and albuterol prn \par HTN- c/w home regimen\par c/w all current meds\par Pulmonary follow up \par House call options provided for patient

## 2020-10-30 DIAGNOSIS — Z66 DO NOT RESUSCITATE: ICD-10-CM

## 2020-10-30 DIAGNOSIS — J44.1 CHRONIC OBSTRUCTIVE PULMONARY DISEASE WITH (ACUTE) EXACERBATION: ICD-10-CM

## 2020-10-30 DIAGNOSIS — E87.5 HYPERKALEMIA: ICD-10-CM

## 2020-10-30 DIAGNOSIS — J96.21 ACUTE AND CHRONIC RESPIRATORY FAILURE WITH HYPOXIA: ICD-10-CM

## 2020-10-30 DIAGNOSIS — J96.22 ACUTE AND CHRONIC RESPIRATORY FAILURE WITH HYPERCAPNIA: ICD-10-CM

## 2020-10-30 DIAGNOSIS — Z87.891 PERSONAL HISTORY OF NICOTINE DEPENDENCE: ICD-10-CM

## 2020-10-30 DIAGNOSIS — K21.9 GASTRO-ESOPHAGEAL REFLUX DISEASE WITHOUT ESOPHAGITIS: ICD-10-CM

## 2020-10-30 DIAGNOSIS — Z88.1 ALLERGY STATUS TO OTHER ANTIBIOTIC AGENTS STATUS: ICD-10-CM

## 2020-10-30 DIAGNOSIS — I10 ESSENTIAL (PRIMARY) HYPERTENSION: ICD-10-CM

## 2020-10-30 DIAGNOSIS — F41.9 ANXIETY DISORDER, UNSPECIFIED: ICD-10-CM

## 2020-10-30 DIAGNOSIS — E87.2 ACIDOSIS: ICD-10-CM

## 2020-11-02 ENCOUNTER — NON-APPOINTMENT (OUTPATIENT)
Age: 75
End: 2020-11-02

## 2020-11-11 ENCOUNTER — NON-APPOINTMENT (OUTPATIENT)
Age: 75
End: 2020-11-11

## 2020-11-18 ENCOUNTER — NON-APPOINTMENT (OUTPATIENT)
Age: 75
End: 2020-11-18

## 2022-01-01 ENCOUNTER — TRANSCRIPTION ENCOUNTER (OUTPATIENT)
Age: 77
End: 2022-01-01

## 2022-01-01 ENCOUNTER — APPOINTMENT (OUTPATIENT)
Dept: CARE COORDINATION | Facility: HOME HEALTH | Age: 77
End: 2022-01-01
Payer: MEDICARE

## 2022-01-01 ENCOUNTER — EMERGENCY (EMERGENCY)
Facility: HOSPITAL | Age: 77
LOS: 0 days | Discharge: HOME | End: 2022-05-23
Attending: EMERGENCY MEDICINE | Admitting: EMERGENCY MEDICINE
Payer: MEDICARE

## 2022-01-01 ENCOUNTER — INPATIENT (INPATIENT)
Facility: HOSPITAL | Age: 77
LOS: 5 days | Discharge: HOME HEALTH | End: 2022-05-04
Attending: HOSPITALIST | Admitting: HOSPITALIST
Payer: MEDICARE

## 2022-01-01 ENCOUNTER — INPATIENT (INPATIENT)
Facility: HOSPITAL | Age: 77
LOS: 2 days | End: 2022-06-01
Attending: INTERNAL MEDICINE | Admitting: INTERNAL MEDICINE
Payer: MEDICARE

## 2022-01-01 VITALS
RESPIRATION RATE: 26 BRPM | WEIGHT: 119.93 LBS | DIASTOLIC BLOOD PRESSURE: 72 MMHG | SYSTOLIC BLOOD PRESSURE: 148 MMHG | OXYGEN SATURATION: 99 % | HEIGHT: 63 IN | HEART RATE: 114 BPM

## 2022-01-01 VITALS
HEIGHT: 63 IN | HEART RATE: 79 BPM | RESPIRATION RATE: 20 BRPM | OXYGEN SATURATION: 100 % | DIASTOLIC BLOOD PRESSURE: 87 MMHG | SYSTOLIC BLOOD PRESSURE: 145 MMHG

## 2022-01-01 VITALS — HEIGHT: 63 IN | WEIGHT: 119.93 LBS

## 2022-01-01 VITALS
TEMPERATURE: 98 F | DIASTOLIC BLOOD PRESSURE: 71 MMHG | HEART RATE: 80 BPM | SYSTOLIC BLOOD PRESSURE: 144 MMHG | RESPIRATION RATE: 18 BRPM

## 2022-01-01 VITALS — OXYGEN SATURATION: 96 % | RESPIRATION RATE: 18 BRPM

## 2022-01-01 VITALS
HEART RATE: 89 BPM | OXYGEN SATURATION: 82 % | RESPIRATION RATE: 42 BRPM | SYSTOLIC BLOOD PRESSURE: 130 MMHG | DIASTOLIC BLOOD PRESSURE: 72 MMHG

## 2022-01-01 DIAGNOSIS — J96.22 ACUTE AND CHRONIC RESPIRATORY FAILURE WITH HYPERCAPNIA: ICD-10-CM

## 2022-01-01 DIAGNOSIS — I10 ESSENTIAL (PRIMARY) HYPERTENSION: ICD-10-CM

## 2022-01-01 DIAGNOSIS — Z20.822 CONTACT WITH AND (SUSPECTED) EXPOSURE TO COVID-19: ICD-10-CM

## 2022-01-01 DIAGNOSIS — Z90.710 ACQUIRED ABSENCE OF BOTH CERVIX AND UTERUS: Chronic | ICD-10-CM

## 2022-01-01 DIAGNOSIS — J44.0 CHRONIC OBSTRUCTIVE PULMONARY DISEASE WITH (ACUTE) LOWER RESPIRATORY INFECTION: ICD-10-CM

## 2022-01-01 DIAGNOSIS — Z88.1 ALLERGY STATUS TO OTHER ANTIBIOTIC AGENTS STATUS: ICD-10-CM

## 2022-01-01 DIAGNOSIS — G92.9 UNSPECIFIED TOXIC ENCEPHALOPATHY: ICD-10-CM

## 2022-01-01 DIAGNOSIS — Z79.899 OTHER LONG TERM (CURRENT) DRUG THERAPY: ICD-10-CM

## 2022-01-01 DIAGNOSIS — Z90.49 ACQUIRED ABSENCE OF OTHER SPECIFIED PARTS OF DIGESTIVE TRACT: Chronic | ICD-10-CM

## 2022-01-01 DIAGNOSIS — J44.9 CHRONIC OBSTRUCTIVE PULMONARY DISEASE, UNSPECIFIED: ICD-10-CM

## 2022-01-01 DIAGNOSIS — J18.9 PNEUMONIA, UNSPECIFIED ORGANISM: ICD-10-CM

## 2022-01-01 DIAGNOSIS — E53.8 DEFICIENCY OF OTHER SPECIFIED B GROUP VITAMINS: ICD-10-CM

## 2022-01-01 DIAGNOSIS — F41.9 ANXIETY DISORDER, UNSPECIFIED: ICD-10-CM

## 2022-01-01 DIAGNOSIS — F32.A DEPRESSION, UNSPECIFIED: ICD-10-CM

## 2022-01-01 DIAGNOSIS — Z90.711 ACQUIRED ABSENCE OF UTERUS WITH REMAINING CERVICAL STUMP: ICD-10-CM

## 2022-01-01 DIAGNOSIS — Z90.49 ACQUIRED ABSENCE OF OTHER SPECIFIED PARTS OF DIGESTIVE TRACT: ICD-10-CM

## 2022-01-01 DIAGNOSIS — E44.0 MODERATE PROTEIN-CALORIE MALNUTRITION: ICD-10-CM

## 2022-01-01 DIAGNOSIS — J44.1 CHRONIC OBSTRUCTIVE PULMONARY DISEASE WITH (ACUTE) EXACERBATION: ICD-10-CM

## 2022-01-01 DIAGNOSIS — J69.0 PNEUMONITIS DUE TO INHALATION OF FOOD AND VOMIT: ICD-10-CM

## 2022-01-01 DIAGNOSIS — Z66 DO NOT RESUSCITATE: ICD-10-CM

## 2022-01-01 DIAGNOSIS — Z99.81 DEPENDENCE ON SUPPLEMENTAL OXYGEN: ICD-10-CM
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
## 2022-01-01 DIAGNOSIS — Z87.891 PERSONAL HISTORY OF NICOTINE DEPENDENCE: ICD-10-CM

## 2022-01-01 DIAGNOSIS — Z04.3 ENCOUNTER FOR EXAMINATION AND OBSERVATION FOLLOWING OTHER ACCIDENT: ICD-10-CM
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
## 2022-01-01 DIAGNOSIS — J96.21 ACUTE AND CHRONIC RESPIRATORY FAILURE WITH HYPOXIA: ICD-10-CM

## 2022-01-01 DIAGNOSIS — Z90.710 ACQUIRED ABSENCE OF BOTH CERVIX AND UTERUS: ICD-10-CM

## 2022-01-01 LAB
ALBUMIN SERPL ELPH-MCNC: 3.2 G/DL — LOW (ref 3.5–5.2)
ALBUMIN SERPL ELPH-MCNC: 3.4 G/DL — LOW (ref 3.5–5.2)
ALBUMIN SERPL ELPH-MCNC: 3.5 G/DL — SIGNIFICANT CHANGE UP (ref 3.5–5.2)
ALBUMIN SERPL ELPH-MCNC: 3.5 G/DL — SIGNIFICANT CHANGE UP (ref 3.5–5.2)
ALBUMIN SERPL ELPH-MCNC: 3.6 G/DL — SIGNIFICANT CHANGE UP (ref 3.5–5.2)
ALBUMIN SERPL ELPH-MCNC: 3.9 G/DL — SIGNIFICANT CHANGE UP (ref 3.5–5.2)
ALBUMIN SERPL ELPH-MCNC: 4 G/DL — SIGNIFICANT CHANGE UP (ref 3.5–5.2)
ALBUMIN SERPL ELPH-MCNC: 4.1 G/DL — SIGNIFICANT CHANGE UP (ref 3.5–5.2)
ALBUMIN SERPL ELPH-MCNC: 4.2 G/DL — SIGNIFICANT CHANGE UP (ref 3.5–5.2)
ALBUMIN SERPL ELPH-MCNC: 4.5 G/DL — SIGNIFICANT CHANGE UP (ref 3.5–5.2)
ALP SERPL-CCNC: 48 U/L — SIGNIFICANT CHANGE UP (ref 30–115)
ALP SERPL-CCNC: 49 U/L — SIGNIFICANT CHANGE UP (ref 30–115)
ALP SERPL-CCNC: 49 U/L — SIGNIFICANT CHANGE UP (ref 30–115)
ALP SERPL-CCNC: 50 U/L — SIGNIFICANT CHANGE UP (ref 30–115)
ALP SERPL-CCNC: 53 U/L — SIGNIFICANT CHANGE UP (ref 30–115)
ALP SERPL-CCNC: 56 U/L — SIGNIFICANT CHANGE UP (ref 30–115)
ALP SERPL-CCNC: 56 U/L — SIGNIFICANT CHANGE UP (ref 30–115)
ALP SERPL-CCNC: 62 U/L — SIGNIFICANT CHANGE UP (ref 30–115)
ALP SERPL-CCNC: 62 U/L — SIGNIFICANT CHANGE UP (ref 30–115)
ALP SERPL-CCNC: 68 U/L — SIGNIFICANT CHANGE UP (ref 30–115)
ALP SERPL-CCNC: 73 U/L — SIGNIFICANT CHANGE UP (ref 30–115)
ALP SERPL-CCNC: 74 U/L — SIGNIFICANT CHANGE UP (ref 30–115)
ALP SERPL-CCNC: 78 U/L — SIGNIFICANT CHANGE UP (ref 30–115)
ALP SERPL-CCNC: 80 U/L — SIGNIFICANT CHANGE UP (ref 30–115)
ALT FLD-CCNC: 12 U/L — SIGNIFICANT CHANGE UP (ref 0–41)
ALT FLD-CCNC: 5 U/L — SIGNIFICANT CHANGE UP (ref 0–41)
ALT FLD-CCNC: 5 U/L — SIGNIFICANT CHANGE UP (ref 0–41)
ALT FLD-CCNC: 6 U/L — SIGNIFICANT CHANGE UP (ref 0–41)
ALT FLD-CCNC: 6 U/L — SIGNIFICANT CHANGE UP (ref 0–41)
ALT FLD-CCNC: 7 U/L — SIGNIFICANT CHANGE UP (ref 0–41)
ALT FLD-CCNC: 7 U/L — SIGNIFICANT CHANGE UP (ref 0–41)
ALT FLD-CCNC: 8 U/L — SIGNIFICANT CHANGE UP (ref 0–41)
ALT FLD-CCNC: 9 U/L — SIGNIFICANT CHANGE UP (ref 0–41)
ALT FLD-CCNC: <5 U/L — SIGNIFICANT CHANGE UP (ref 0–41)
ANION GAP SERPL CALC-SCNC: 10 MMOL/L — SIGNIFICANT CHANGE UP (ref 7–14)
ANION GAP SERPL CALC-SCNC: 11 MMOL/L — SIGNIFICANT CHANGE UP (ref 7–14)
ANION GAP SERPL CALC-SCNC: 12 MMOL/L — SIGNIFICANT CHANGE UP (ref 7–14)
ANION GAP SERPL CALC-SCNC: 14 MMOL/L — SIGNIFICANT CHANGE UP (ref 7–14)
ANION GAP SERPL CALC-SCNC: 15 MMOL/L — HIGH (ref 7–14)
ANION GAP SERPL CALC-SCNC: 16 MMOL/L — HIGH (ref 7–14)
ANION GAP SERPL CALC-SCNC: 16 MMOL/L — HIGH (ref 7–14)
ANION GAP SERPL CALC-SCNC: 20 MMOL/L — HIGH (ref 7–14)
ANION GAP SERPL CALC-SCNC: 6 MMOL/L — LOW (ref 7–14)
ANION GAP SERPL CALC-SCNC: 7 MMOL/L — SIGNIFICANT CHANGE UP (ref 7–14)
ANION GAP SERPL CALC-SCNC: 8 MMOL/L — SIGNIFICANT CHANGE UP (ref 7–14)
ANION GAP SERPL CALC-SCNC: 9 MMOL/L — SIGNIFICANT CHANGE UP (ref 7–14)
APAP SERPL-MCNC: <5 UG/ML — LOW (ref 10–30)
APPEARANCE UR: CLEAR — SIGNIFICANT CHANGE UP
APPEARANCE UR: CLEAR — SIGNIFICANT CHANGE UP
APTT BLD: 23.4 SEC — CRITICAL LOW (ref 27–39.2)
AST SERPL-CCNC: 10 U/L — SIGNIFICANT CHANGE UP (ref 0–41)
AST SERPL-CCNC: 12 U/L — SIGNIFICANT CHANGE UP (ref 0–41)
AST SERPL-CCNC: 15 U/L — SIGNIFICANT CHANGE UP (ref 0–41)
AST SERPL-CCNC: 18 U/L — SIGNIFICANT CHANGE UP (ref 0–41)
AST SERPL-CCNC: 19 U/L — SIGNIFICANT CHANGE UP (ref 0–41)
AST SERPL-CCNC: 23 U/L — SIGNIFICANT CHANGE UP (ref 0–41)
AST SERPL-CCNC: 28 U/L — SIGNIFICANT CHANGE UP (ref 0–41)
AST SERPL-CCNC: 28 U/L — SIGNIFICANT CHANGE UP (ref 0–41)
AST SERPL-CCNC: 7 U/L — SIGNIFICANT CHANGE UP (ref 0–41)
AST SERPL-CCNC: 9 U/L — SIGNIFICANT CHANGE UP (ref 0–41)
AST SERPL-CCNC: 9 U/L — SIGNIFICANT CHANGE UP (ref 0–41)
BACTERIA # UR AUTO: NEGATIVE — SIGNIFICANT CHANGE UP
BACTERIA # UR AUTO: NEGATIVE — SIGNIFICANT CHANGE UP
BASE EXCESS BLDA CALC-SCNC: 18.2 MMOL/L — HIGH (ref -2–3)
BASE EXCESS BLDV CALC-SCNC: 15.2 MMOL/L — HIGH (ref -2–3)
BASE EXCESS BLDV CALC-SCNC: 16.6 MMOL/L — HIGH (ref -2–3)
BASE EXCESS BLDV CALC-SCNC: 16.7 MMOL/L — HIGH (ref -2–3)
BASE EXCESS BLDV CALC-SCNC: 17.6 MMOL/L — HIGH (ref -2–3)
BASE EXCESS BLDV CALC-SCNC: 19 MMOL/L — HIGH (ref -2–3)
BASE EXCESS BLDV CALC-SCNC: 19.2 MMOL/L — HIGH (ref -2–3)
BASOPHILS # BLD AUTO: 0 K/UL — SIGNIFICANT CHANGE UP (ref 0–0.2)
BASOPHILS # BLD AUTO: 0.01 K/UL — SIGNIFICANT CHANGE UP (ref 0–0.2)
BASOPHILS # BLD AUTO: 0.02 K/UL — SIGNIFICANT CHANGE UP (ref 0–0.2)
BASOPHILS # BLD AUTO: 0.02 K/UL — SIGNIFICANT CHANGE UP (ref 0–0.2)
BASOPHILS # BLD AUTO: 0.03 K/UL — SIGNIFICANT CHANGE UP (ref 0–0.2)
BASOPHILS # BLD AUTO: 0.03 K/UL — SIGNIFICANT CHANGE UP (ref 0–0.2)
BASOPHILS # BLD AUTO: 0.04 K/UL — SIGNIFICANT CHANGE UP (ref 0–0.2)
BASOPHILS NFR BLD AUTO: 0 % — SIGNIFICANT CHANGE UP (ref 0–1)
BASOPHILS NFR BLD AUTO: 0.1 % — SIGNIFICANT CHANGE UP (ref 0–1)
BASOPHILS NFR BLD AUTO: 0.2 % — SIGNIFICANT CHANGE UP (ref 0–1)
BASOPHILS NFR BLD AUTO: 0.3 % — SIGNIFICANT CHANGE UP (ref 0–1)
BASOPHILS NFR BLD AUTO: 0.4 % — SIGNIFICANT CHANGE UP (ref 0–1)
BILIRUB SERPL-MCNC: 0.2 MG/DL — SIGNIFICANT CHANGE UP (ref 0.2–1.2)
BILIRUB SERPL-MCNC: 0.3 MG/DL — SIGNIFICANT CHANGE UP (ref 0.2–1.2)
BILIRUB SERPL-MCNC: 0.4 MG/DL — SIGNIFICANT CHANGE UP (ref 0.2–1.2)
BILIRUB SERPL-MCNC: 0.5 MG/DL — SIGNIFICANT CHANGE UP (ref 0.2–1.2)
BILIRUB SERPL-MCNC: 0.6 MG/DL — SIGNIFICANT CHANGE UP (ref 0.2–1.2)
BILIRUB UR-MCNC: NEGATIVE — SIGNIFICANT CHANGE UP
BILIRUB UR-MCNC: NEGATIVE — SIGNIFICANT CHANGE UP
BLD GP AB SCN SERPL QL: SIGNIFICANT CHANGE UP
BUN SERPL-MCNC: 11 MG/DL — SIGNIFICANT CHANGE UP (ref 10–20)
BUN SERPL-MCNC: 12 MG/DL — SIGNIFICANT CHANGE UP (ref 10–20)
BUN SERPL-MCNC: 12 MG/DL — SIGNIFICANT CHANGE UP (ref 10–20)
BUN SERPL-MCNC: 14 MG/DL — SIGNIFICANT CHANGE UP (ref 10–20)
BUN SERPL-MCNC: 14 MG/DL — SIGNIFICANT CHANGE UP (ref 10–20)
BUN SERPL-MCNC: 15 MG/DL — SIGNIFICANT CHANGE UP (ref 10–20)
BUN SERPL-MCNC: 16 MG/DL — SIGNIFICANT CHANGE UP (ref 10–20)
BUN SERPL-MCNC: 16 MG/DL — SIGNIFICANT CHANGE UP (ref 10–20)
BUN SERPL-MCNC: 18 MG/DL — SIGNIFICANT CHANGE UP (ref 10–20)
BUN SERPL-MCNC: 21 MG/DL — HIGH (ref 10–20)
BUN SERPL-MCNC: 24 MG/DL — HIGH (ref 10–20)
BUN SERPL-MCNC: 26 MG/DL — HIGH (ref 10–20)
BUN SERPL-MCNC: 27 MG/DL — HIGH (ref 10–20)
BUN SERPL-MCNC: 37 MG/DL — HIGH (ref 10–20)
BUN SERPL-MCNC: 52 MG/DL — HIGH (ref 10–20)
BUN SERPL-MCNC: 76 MG/DL — CRITICAL HIGH (ref 10–20)
BUN SERPL-MCNC: 78 MG/DL — CRITICAL HIGH (ref 10–20)
CA-I SERPL-SCNC: 1.12 MMOL/L — LOW (ref 1.15–1.33)
CA-I SERPL-SCNC: 1.19 MMOL/L — SIGNIFICANT CHANGE UP (ref 1.15–1.33)
CA-I SERPL-SCNC: 1.2 MMOL/L — SIGNIFICANT CHANGE UP (ref 1.15–1.33)
CA-I SERPL-SCNC: 1.22 MMOL/L — SIGNIFICANT CHANGE UP (ref 1.15–1.33)
CA-I SERPL-SCNC: 1.23 MMOL/L — SIGNIFICANT CHANGE UP (ref 1.15–1.33)
CA-I SERPL-SCNC: 1.23 MMOL/L — SIGNIFICANT CHANGE UP (ref 1.15–1.33)
CALCIUM SERPL-MCNC: 10 MG/DL — SIGNIFICANT CHANGE UP (ref 8.5–10.1)
CALCIUM SERPL-MCNC: 10 MG/DL — SIGNIFICANT CHANGE UP (ref 8.5–10.1)
CALCIUM SERPL-MCNC: 10.2 MG/DL — HIGH (ref 8.5–10.1)
CALCIUM SERPL-MCNC: 8.7 MG/DL — SIGNIFICANT CHANGE UP (ref 8.5–10.1)
CALCIUM SERPL-MCNC: 8.9 MG/DL — SIGNIFICANT CHANGE UP (ref 8.5–10.1)
CALCIUM SERPL-MCNC: 8.9 MG/DL — SIGNIFICANT CHANGE UP (ref 8.5–10.1)
CALCIUM SERPL-MCNC: 9 MG/DL — SIGNIFICANT CHANGE UP (ref 8.5–10.1)
CALCIUM SERPL-MCNC: 9.1 MG/DL — SIGNIFICANT CHANGE UP (ref 8.5–10.1)
CALCIUM SERPL-MCNC: 9.3 MG/DL — SIGNIFICANT CHANGE UP (ref 8.5–10.1)
CALCIUM SERPL-MCNC: 9.4 MG/DL — SIGNIFICANT CHANGE UP (ref 8.5–10.1)
CALCIUM SERPL-MCNC: 9.4 MG/DL — SIGNIFICANT CHANGE UP (ref 8.5–10.1)
CALCIUM SERPL-MCNC: 9.5 MG/DL — SIGNIFICANT CHANGE UP (ref 8.5–10.1)
CALCIUM SERPL-MCNC: 9.6 MG/DL — SIGNIFICANT CHANGE UP (ref 8.5–10.1)
CALCIUM SERPL-MCNC: 9.7 MG/DL — SIGNIFICANT CHANGE UP (ref 8.5–10.1)
CALCIUM SERPL-MCNC: 9.9 MG/DL — SIGNIFICANT CHANGE UP (ref 8.5–10.1)
CHLORIDE SERPL-SCNC: 89 MMOL/L — LOW (ref 98–110)
CHLORIDE SERPL-SCNC: 89 MMOL/L — LOW (ref 98–110)
CHLORIDE SERPL-SCNC: 90 MMOL/L — LOW (ref 98–110)
CHLORIDE SERPL-SCNC: 90 MMOL/L — LOW (ref 98–110)
CHLORIDE SERPL-SCNC: 91 MMOL/L — LOW (ref 98–110)
CHLORIDE SERPL-SCNC: 92 MMOL/L — LOW (ref 98–110)
CHLORIDE SERPL-SCNC: 93 MMOL/L — LOW (ref 98–110)
CHLORIDE SERPL-SCNC: 94 MMOL/L — LOW (ref 98–110)
CHLORIDE SERPL-SCNC: 95 MMOL/L — LOW (ref 98–110)
CHLORIDE SERPL-SCNC: 96 MMOL/L — LOW (ref 98–110)
CK MB CFR SERPL CALC: 2.1 NG/ML — SIGNIFICANT CHANGE UP (ref 0.6–6.3)
CK MB CFR SERPL CALC: 2.6 NG/ML — SIGNIFICANT CHANGE UP (ref 0.6–6.3)
CK SERPL-CCNC: 139 U/L — SIGNIFICANT CHANGE UP (ref 0–225)
CO2 BLDV-SCNC: 48.5 MMOL/L — HIGH (ref 22–26)
CO2 SERPL-SCNC: 34 MMOL/L — HIGH (ref 17–32)
CO2 SERPL-SCNC: 34 MMOL/L — HIGH (ref 17–32)
CO2 SERPL-SCNC: 35 MMOL/L — HIGH (ref 17–32)
CO2 SERPL-SCNC: 36 MMOL/L — HIGH (ref 17–32)
CO2 SERPL-SCNC: 36 MMOL/L — HIGH (ref 17–32)
CO2 SERPL-SCNC: 37 MMOL/L — HIGH (ref 17–32)
CO2 SERPL-SCNC: 39 MMOL/L — HIGH (ref 17–32)
CO2 SERPL-SCNC: 39 MMOL/L — HIGH (ref 17–32)
CO2 SERPL-SCNC: 40 MMOL/L — HIGH (ref 17–32)
CO2 SERPL-SCNC: 42 MMOL/L — CRITICAL HIGH (ref 17–32)
CO2 SERPL-SCNC: 43 MMOL/L — CRITICAL HIGH (ref 17–32)
CO2 SERPL-SCNC: 43 MMOL/L — CRITICAL HIGH (ref 17–32)
CO2 SERPL-SCNC: 44 MMOL/L — CRITICAL HIGH (ref 17–32)
CO2 SERPL-SCNC: 44 MMOL/L — CRITICAL HIGH (ref 17–32)
COLOR SPEC: SIGNIFICANT CHANGE UP
COLOR SPEC: YELLOW — SIGNIFICANT CHANGE UP
CREAT SERPL-MCNC: 0.6 MG/DL — LOW (ref 0.7–1.5)
CREAT SERPL-MCNC: 0.6 MG/DL — LOW (ref 0.7–1.5)
CREAT SERPL-MCNC: 0.7 MG/DL — SIGNIFICANT CHANGE UP (ref 0.7–1.5)
CREAT SERPL-MCNC: 0.8 MG/DL — SIGNIFICANT CHANGE UP (ref 0.7–1.5)
CREAT SERPL-MCNC: 0.9 MG/DL — SIGNIFICANT CHANGE UP (ref 0.7–1.5)
CREAT SERPL-MCNC: 1.1 MG/DL — SIGNIFICANT CHANGE UP (ref 0.7–1.5)
CREAT SERPL-MCNC: 1.4 MG/DL — SIGNIFICANT CHANGE UP (ref 0.7–1.5)
CREAT SERPL-MCNC: 2.4 MG/DL — HIGH (ref 0.7–1.5)
CREAT SERPL-MCNC: 2.4 MG/DL — HIGH (ref 0.7–1.5)
CULTURE RESULTS: NO GROWTH — SIGNIFICANT CHANGE UP
CULTURE RESULTS: SIGNIFICANT CHANGE UP
DIFF PNL FLD: ABNORMAL
DIFF PNL FLD: NEGATIVE — SIGNIFICANT CHANGE UP
EGFR: 20 ML/MIN/1.73M2 — LOW
EGFR: 20 ML/MIN/1.73M2 — LOW
EGFR: 39 ML/MIN/1.73M2 — LOW
EGFR: 52 ML/MIN/1.73M2 — LOW
EGFR: 66 ML/MIN/1.73M2 — SIGNIFICANT CHANGE UP
EGFR: 76 ML/MIN/1.73M2 — SIGNIFICANT CHANGE UP
EGFR: 90 ML/MIN/1.73M2 — SIGNIFICANT CHANGE UP
EGFR: 93 ML/MIN/1.73M2 — SIGNIFICANT CHANGE UP
EGFR: 93 ML/MIN/1.73M2 — SIGNIFICANT CHANGE UP
EOSINOPHIL # BLD AUTO: 0 K/UL — SIGNIFICANT CHANGE UP (ref 0–0.7)
EOSINOPHIL # BLD AUTO: 0.01 K/UL — SIGNIFICANT CHANGE UP (ref 0–0.7)
EOSINOPHIL # BLD AUTO: 0.01 K/UL — SIGNIFICANT CHANGE UP (ref 0–0.7)
EOSINOPHIL # BLD AUTO: 0.02 K/UL — SIGNIFICANT CHANGE UP (ref 0–0.7)
EOSINOPHIL # BLD AUTO: 0.02 K/UL — SIGNIFICANT CHANGE UP (ref 0–0.7)
EOSINOPHIL # BLD AUTO: 0.05 K/UL — SIGNIFICANT CHANGE UP (ref 0–0.7)
EOSINOPHIL # BLD AUTO: 0.06 K/UL — SIGNIFICANT CHANGE UP (ref 0–0.7)
EOSINOPHIL # BLD AUTO: 0.08 K/UL — SIGNIFICANT CHANGE UP (ref 0–0.7)
EOSINOPHIL # BLD AUTO: 0.08 K/UL — SIGNIFICANT CHANGE UP (ref 0–0.7)
EOSINOPHIL # BLD AUTO: 0.12 K/UL — SIGNIFICANT CHANGE UP (ref 0–0.7)
EOSINOPHIL NFR BLD AUTO: 0 % — SIGNIFICANT CHANGE UP (ref 0–8)
EOSINOPHIL NFR BLD AUTO: 0.1 % — SIGNIFICANT CHANGE UP (ref 0–8)
EOSINOPHIL NFR BLD AUTO: 0.1 % — SIGNIFICANT CHANGE UP (ref 0–8)
EOSINOPHIL NFR BLD AUTO: 0.2 % — SIGNIFICANT CHANGE UP (ref 0–8)
EOSINOPHIL NFR BLD AUTO: 0.3 % — SIGNIFICANT CHANGE UP (ref 0–8)
EOSINOPHIL NFR BLD AUTO: 0.3 % — SIGNIFICANT CHANGE UP (ref 0–8)
EOSINOPHIL NFR BLD AUTO: 0.8 % — SIGNIFICANT CHANGE UP (ref 0–8)
EOSINOPHIL NFR BLD AUTO: 0.8 % — SIGNIFICANT CHANGE UP (ref 0–8)
EOSINOPHIL NFR BLD AUTO: 1 % — SIGNIFICANT CHANGE UP (ref 0–8)
EOSINOPHIL NFR BLD AUTO: 1.6 % — SIGNIFICANT CHANGE UP (ref 0–8)
EPI CELLS # UR: 1 /HPF — SIGNIFICANT CHANGE UP (ref 0–5)
EPI CELLS # UR: 4 /HPF — SIGNIFICANT CHANGE UP (ref 0–5)
ETHANOL SERPL-MCNC: <10 MG/DL — SIGNIFICANT CHANGE UP
ETHANOL SERPL-MCNC: <10 MG/DL — SIGNIFICANT CHANGE UP
FLUAV AG NPH QL: SIGNIFICANT CHANGE UP
FLUBV AG NPH QL: SIGNIFICANT CHANGE UP
GAS PNL BLDA: SIGNIFICANT CHANGE UP
GAS PNL BLDA: SIGNIFICANT CHANGE UP
GAS PNL BLDV: 133 MMOL/L — LOW (ref 136–145)
GAS PNL BLDV: 134 MMOL/L — LOW (ref 136–145)
GAS PNL BLDV: 135 MMOL/L — LOW (ref 136–145)
GAS PNL BLDV: 136 MMOL/L — SIGNIFICANT CHANGE UP (ref 136–145)
GAS PNL BLDV: 137 MMOL/L — SIGNIFICANT CHANGE UP (ref 136–145)
GAS PNL BLDV: 139 MMOL/L — SIGNIFICANT CHANGE UP (ref 136–145)
GAS PNL BLDV: SIGNIFICANT CHANGE UP
GLUCOSE BLDC GLUCOMTR-MCNC: 100 MG/DL — HIGH (ref 70–99)
GLUCOSE SERPL-MCNC: 100 MG/DL — HIGH (ref 70–99)
GLUCOSE SERPL-MCNC: 108 MG/DL — HIGH (ref 70–99)
GLUCOSE SERPL-MCNC: 110 MG/DL — HIGH (ref 70–99)
GLUCOSE SERPL-MCNC: 111 MG/DL — HIGH (ref 70–99)
GLUCOSE SERPL-MCNC: 112 MG/DL — HIGH (ref 70–99)
GLUCOSE SERPL-MCNC: 119 MG/DL — HIGH (ref 70–99)
GLUCOSE SERPL-MCNC: 126 MG/DL — HIGH (ref 70–99)
GLUCOSE SERPL-MCNC: 133 MG/DL — HIGH (ref 70–99)
GLUCOSE SERPL-MCNC: 135 MG/DL — HIGH (ref 70–99)
GLUCOSE SERPL-MCNC: 161 MG/DL — HIGH (ref 70–99)
GLUCOSE SERPL-MCNC: 185 MG/DL — HIGH (ref 70–99)
GLUCOSE SERPL-MCNC: 216 MG/DL — HIGH (ref 70–99)
GLUCOSE SERPL-MCNC: 79 MG/DL — SIGNIFICANT CHANGE UP (ref 70–99)
GLUCOSE SERPL-MCNC: 80 MG/DL — SIGNIFICANT CHANGE UP (ref 70–99)
GLUCOSE SERPL-MCNC: 87 MG/DL — SIGNIFICANT CHANGE UP (ref 70–99)
GLUCOSE SERPL-MCNC: 96 MG/DL — SIGNIFICANT CHANGE UP (ref 70–99)
GLUCOSE SERPL-MCNC: 99 MG/DL — SIGNIFICANT CHANGE UP (ref 70–99)
GLUCOSE UR QL: NEGATIVE — SIGNIFICANT CHANGE UP
GLUCOSE UR QL: NEGATIVE — SIGNIFICANT CHANGE UP
HCO3 BLDA-SCNC: 50 MMOL/L — CRITICAL HIGH (ref 21–28)
HCO3 BLDV-SCNC: 46 MMOL/L — CRITICAL HIGH (ref 22–29)
HCO3 BLDV-SCNC: 47 MMOL/L — CRITICAL HIGH (ref 22–29)
HCO3 BLDV-SCNC: 48 MMOL/L — CRITICAL HIGH (ref 22–29)
HCO3 BLDV-SCNC: 50 MMOL/L — CRITICAL HIGH (ref 22–29)
HCO3 BLDV-SCNC: 51 MMOL/L — CRITICAL HIGH (ref 22–29)
HCO3 BLDV-SCNC: 52 MMOL/L — CRITICAL HIGH (ref 22–29)
HCT VFR BLD CALC: 28.4 % — LOW (ref 37–47)
HCT VFR BLD CALC: 29 % — LOW (ref 37–47)
HCT VFR BLD CALC: 29.1 % — LOW (ref 37–47)
HCT VFR BLD CALC: 29.3 % — LOW (ref 37–47)
HCT VFR BLD CALC: 30.2 % — LOW (ref 37–47)
HCT VFR BLD CALC: 31 % — LOW (ref 37–47)
HCT VFR BLD CALC: 32.6 % — LOW (ref 37–47)
HCT VFR BLD CALC: 33.1 % — LOW (ref 37–47)
HCT VFR BLD CALC: 33.8 % — LOW (ref 37–47)
HCT VFR BLD CALC: 34.2 % — LOW (ref 37–47)
HCT VFR BLD CALC: 34.2 % — LOW (ref 37–47)
HCT VFR BLD CALC: 34.9 % — LOW (ref 37–47)
HCT VFR BLD CALC: 35.4 % — LOW (ref 37–47)
HCT VFR BLD CALC: 37 % — SIGNIFICANT CHANGE UP (ref 37–47)
HCT VFR BLD CALC: 39.1 % — SIGNIFICANT CHANGE UP (ref 37–47)
HCT VFR BLDA CALC: 31 % — LOW (ref 39–51)
HCT VFR BLDA CALC: 31 % — LOW (ref 39–51)
HCT VFR BLDA CALC: 33 % — LOW (ref 39–51)
HCT VFR BLDA CALC: 34 % — LOW (ref 39–51)
HCT VFR BLDA CALC: 38 % — LOW (ref 39–51)
HCT VFR BLDA CALC: 50 % — SIGNIFICANT CHANGE UP (ref 39–51)
HGB BLD CALC-MCNC: 10.3 G/DL — LOW (ref 12.6–17.4)
HGB BLD CALC-MCNC: 10.3 G/DL — LOW (ref 12.6–17.4)
HGB BLD CALC-MCNC: 10.9 G/DL — LOW (ref 12.6–17.4)
HGB BLD CALC-MCNC: 11.2 G/DL — LOW (ref 12.6–17.4)
HGB BLD CALC-MCNC: 12.7 G/DL — SIGNIFICANT CHANGE UP (ref 12.6–17.4)
HGB BLD CALC-MCNC: 16.8 G/DL — SIGNIFICANT CHANGE UP (ref 12.6–17.4)
HGB BLD-MCNC: 10.2 G/DL — LOW (ref 12–16)
HGB BLD-MCNC: 10.6 G/DL — LOW (ref 12–16)
HGB BLD-MCNC: 10.7 G/DL — LOW (ref 12–16)
HGB BLD-MCNC: 10.7 G/DL — LOW (ref 12–16)
HGB BLD-MCNC: 10.8 G/DL — LOW (ref 12–16)
HGB BLD-MCNC: 10.8 G/DL — LOW (ref 12–16)
HGB BLD-MCNC: 11.2 G/DL — LOW (ref 12–16)
HGB BLD-MCNC: 11.9 G/DL — LOW (ref 12–16)
HGB BLD-MCNC: 8.8 G/DL — LOW (ref 12–16)
HGB BLD-MCNC: 9 G/DL — LOW (ref 12–16)
HGB BLD-MCNC: 9.1 G/DL — LOW (ref 12–16)
HGB BLD-MCNC: 9.1 G/DL — LOW (ref 12–16)
HGB BLD-MCNC: 9.3 G/DL — LOW (ref 12–16)
HGB BLD-MCNC: 9.5 G/DL — LOW (ref 12–16)
HGB BLD-MCNC: 9.8 G/DL — LOW (ref 12–16)
HOROWITZ INDEX BLDA+IHG-RTO: 32 — SIGNIFICANT CHANGE UP
HYALINE CASTS # UR AUTO: 1 /LPF — SIGNIFICANT CHANGE UP (ref 0–7)
HYALINE CASTS # UR AUTO: 2 /LPF — SIGNIFICANT CHANGE UP (ref 0–7)
IMM GRANULOCYTES NFR BLD AUTO: 0.3 % — SIGNIFICANT CHANGE UP (ref 0.1–0.3)
IMM GRANULOCYTES NFR BLD AUTO: 0.4 % — HIGH (ref 0.1–0.3)
IMM GRANULOCYTES NFR BLD AUTO: 0.5 % — HIGH (ref 0.1–0.3)
IMM GRANULOCYTES NFR BLD AUTO: 0.6 % — HIGH (ref 0.1–0.3)
IMM GRANULOCYTES NFR BLD AUTO: 0.6 % — HIGH (ref 0.1–0.3)
IMM GRANULOCYTES NFR BLD AUTO: 1 % — HIGH (ref 0.1–0.3)
INR BLD: 0.93 RATIO — SIGNIFICANT CHANGE UP (ref 0.65–1.3)
KETONES UR-MCNC: ABNORMAL
KETONES UR-MCNC: SIGNIFICANT CHANGE UP
LACTATE BLDV-MCNC: 0.5 MMOL/L — SIGNIFICANT CHANGE UP (ref 0.5–2)
LACTATE BLDV-MCNC: 0.6 MMOL/L — SIGNIFICANT CHANGE UP (ref 0.5–2)
LACTATE BLDV-MCNC: 0.7 MMOL/L — SIGNIFICANT CHANGE UP (ref 0.5–2)
LACTATE BLDV-MCNC: 0.8 MMOL/L — SIGNIFICANT CHANGE UP (ref 0.5–2)
LACTATE BLDV-MCNC: 0.9 MMOL/L — SIGNIFICANT CHANGE UP (ref 0.5–2)
LACTATE BLDV-MCNC: 0.9 MMOL/L — SIGNIFICANT CHANGE UP (ref 0.5–2)
LACTATE SERPL-SCNC: 1 MMOL/L — SIGNIFICANT CHANGE UP (ref 0.7–2)
LEGIONELLA AG UR QL: NEGATIVE — SIGNIFICANT CHANGE UP
LEUKOCYTE ESTERASE UR-ACNC: NEGATIVE — SIGNIFICANT CHANGE UP
LEUKOCYTE ESTERASE UR-ACNC: NEGATIVE — SIGNIFICANT CHANGE UP
LIDOCAIN IGE QN: 26 U/L — SIGNIFICANT CHANGE UP (ref 7–60)
LIDOCAIN IGE QN: 78 U/L — HIGH (ref 7–60)
LYMPHOCYTES # BLD AUTO: 0.38 K/UL — LOW (ref 1.2–3.4)
LYMPHOCYTES # BLD AUTO: 0.56 K/UL — LOW (ref 1.2–3.4)
LYMPHOCYTES # BLD AUTO: 0.61 K/UL — LOW (ref 1.2–3.4)
LYMPHOCYTES # BLD AUTO: 0.85 K/UL — LOW (ref 1.2–3.4)
LYMPHOCYTES # BLD AUTO: 0.95 K/UL — LOW (ref 1.2–3.4)
LYMPHOCYTES # BLD AUTO: 0.95 K/UL — LOW (ref 1.2–3.4)
LYMPHOCYTES # BLD AUTO: 1.05 K/UL — LOW (ref 1.2–3.4)
LYMPHOCYTES # BLD AUTO: 1.14 K/UL — LOW (ref 1.2–3.4)
LYMPHOCYTES # BLD AUTO: 1.51 K/UL — SIGNIFICANT CHANGE UP (ref 1.2–3.4)
LYMPHOCYTES # BLD AUTO: 1.55 K/UL — SIGNIFICANT CHANGE UP (ref 1.2–3.4)
LYMPHOCYTES # BLD AUTO: 10 % — LOW (ref 20.5–51.1)
LYMPHOCYTES # BLD AUTO: 13.7 % — LOW (ref 20.5–51.1)
LYMPHOCYTES # BLD AUTO: 15.1 % — LOW (ref 20.5–51.1)
LYMPHOCYTES # BLD AUTO: 19.1 % — LOW (ref 20.5–51.1)
LYMPHOCYTES # BLD AUTO: 19.7 % — LOW (ref 20.5–51.1)
LYMPHOCYTES # BLD AUTO: 3.5 % — LOW (ref 20.5–51.1)
LYMPHOCYTES # BLD AUTO: 3.7 % — LOW (ref 20.5–51.1)
LYMPHOCYTES # BLD AUTO: 5.3 % — LOW (ref 20.5–51.1)
LYMPHOCYTES # BLD AUTO: 6.5 % — LOW (ref 20.5–51.1)
LYMPHOCYTES # BLD AUTO: 8.3 % — LOW (ref 20.5–51.1)
MAGNESIUM SERPL-MCNC: 1.7 MG/DL — LOW (ref 1.8–2.4)
MAGNESIUM SERPL-MCNC: 1.8 MG/DL — SIGNIFICANT CHANGE UP (ref 1.8–2.4)
MAGNESIUM SERPL-MCNC: 1.8 MG/DL — SIGNIFICANT CHANGE UP (ref 1.8–2.4)
MAGNESIUM SERPL-MCNC: 2 MG/DL — SIGNIFICANT CHANGE UP (ref 1.8–2.4)
MAGNESIUM SERPL-MCNC: 2.1 MG/DL — SIGNIFICANT CHANGE UP (ref 1.8–2.4)
MAGNESIUM SERPL-MCNC: 2.4 MG/DL — SIGNIFICANT CHANGE UP (ref 1.8–2.4)
MCHC RBC-ENTMCNC: 29 G/DL — LOW (ref 32–37)
MCHC RBC-ENTMCNC: 29.9 PG — SIGNIFICANT CHANGE UP (ref 27–31)
MCHC RBC-ENTMCNC: 30 G/DL — LOW (ref 32–37)
MCHC RBC-ENTMCNC: 30.1 PG — SIGNIFICANT CHANGE UP (ref 27–31)
MCHC RBC-ENTMCNC: 30.3 G/DL — LOW (ref 32–37)
MCHC RBC-ENTMCNC: 30.4 G/DL — LOW (ref 32–37)
MCHC RBC-ENTMCNC: 30.5 G/DL — LOW (ref 32–37)
MCHC RBC-ENTMCNC: 30.6 G/DL — LOW (ref 32–37)
MCHC RBC-ENTMCNC: 30.6 PG — SIGNIFICANT CHANGE UP (ref 27–31)
MCHC RBC-ENTMCNC: 30.7 G/DL — LOW (ref 32–37)
MCHC RBC-ENTMCNC: 30.8 G/DL — LOW (ref 32–37)
MCHC RBC-ENTMCNC: 30.8 G/DL — LOW (ref 32–37)
MCHC RBC-ENTMCNC: 30.8 PG — SIGNIFICANT CHANGE UP (ref 27–31)
MCHC RBC-ENTMCNC: 30.8 PG — SIGNIFICANT CHANGE UP (ref 27–31)
MCHC RBC-ENTMCNC: 30.9 PG — SIGNIFICANT CHANGE UP (ref 27–31)
MCHC RBC-ENTMCNC: 31 G/DL — LOW (ref 32–37)
MCHC RBC-ENTMCNC: 31 PG — SIGNIFICANT CHANGE UP (ref 27–31)
MCHC RBC-ENTMCNC: 31 PG — SIGNIFICANT CHANGE UP (ref 27–31)
MCHC RBC-ENTMCNC: 31.3 G/DL — LOW (ref 32–37)
MCHC RBC-ENTMCNC: 31.3 G/DL — LOW (ref 32–37)
MCHC RBC-ENTMCNC: 31.4 PG — HIGH (ref 27–31)
MCHC RBC-ENTMCNC: 31.4 PG — HIGH (ref 27–31)
MCHC RBC-ENTMCNC: 31.5 PG — HIGH (ref 27–31)
MCHC RBC-ENTMCNC: 31.6 G/DL — LOW (ref 32–37)
MCHC RBC-ENTMCNC: 32 G/DL — SIGNIFICANT CHANGE UP (ref 32–37)
MCHC RBC-ENTMCNC: 32.5 G/DL — SIGNIFICANT CHANGE UP (ref 32–37)
MCV RBC AUTO: 100 FL — HIGH (ref 81–99)
MCV RBC AUTO: 100.3 FL — HIGH (ref 81–99)
MCV RBC AUTO: 100.3 FL — HIGH (ref 81–99)
MCV RBC AUTO: 100.5 FL — HIGH (ref 81–99)
MCV RBC AUTO: 100.6 FL — HIGH (ref 81–99)
MCV RBC AUTO: 100.9 FL — HIGH (ref 81–99)
MCV RBC AUTO: 101 FL — HIGH (ref 81–99)
MCV RBC AUTO: 102.2 FL — HIGH (ref 81–99)
MCV RBC AUTO: 102.6 FL — HIGH (ref 81–99)
MCV RBC AUTO: 103 FL — HIGH (ref 81–99)
MCV RBC AUTO: 96.4 FL — SIGNIFICANT CHANGE UP (ref 81–99)
MCV RBC AUTO: 97.7 FL — SIGNIFICANT CHANGE UP (ref 81–99)
MCV RBC AUTO: 97.9 FL — SIGNIFICANT CHANGE UP (ref 81–99)
MCV RBC AUTO: 98.6 FL — SIGNIFICANT CHANGE UP (ref 81–99)
MCV RBC AUTO: 98.8 FL — SIGNIFICANT CHANGE UP (ref 81–99)
MONOCYTES # BLD AUTO: 0.26 K/UL — SIGNIFICANT CHANGE UP (ref 0.1–0.6)
MONOCYTES # BLD AUTO: 0.5 K/UL — SIGNIFICANT CHANGE UP (ref 0.1–0.6)
MONOCYTES # BLD AUTO: 0.54 K/UL — SIGNIFICANT CHANGE UP (ref 0.1–0.6)
MONOCYTES # BLD AUTO: 0.78 K/UL — HIGH (ref 0.1–0.6)
MONOCYTES # BLD AUTO: 1.01 K/UL — HIGH (ref 0.1–0.6)
MONOCYTES # BLD AUTO: 1.1 K/UL — HIGH (ref 0.1–0.6)
MONOCYTES # BLD AUTO: 1.16 K/UL — HIGH (ref 0.1–0.6)
MONOCYTES # BLD AUTO: 1.43 K/UL — HIGH (ref 0.1–0.6)
MONOCYTES # BLD AUTO: 1.49 K/UL — HIGH (ref 0.1–0.6)
MONOCYTES # BLD AUTO: 1.61 K/UL — HIGH (ref 0.1–0.6)
MONOCYTES NFR BLD AUTO: 10.6 % — HIGH (ref 1.7–9.3)
MONOCYTES NFR BLD AUTO: 13 % — HIGH (ref 1.7–9.3)
MONOCYTES NFR BLD AUTO: 14.3 % — HIGH (ref 1.7–9.3)
MONOCYTES NFR BLD AUTO: 15.2 % — HIGH (ref 1.7–9.3)
MONOCYTES NFR BLD AUTO: 17.6 % — HIGH (ref 1.7–9.3)
MONOCYTES NFR BLD AUTO: 2.5 % — SIGNIFICANT CHANGE UP (ref 1.7–9.3)
MONOCYTES NFR BLD AUTO: 5.1 % — SIGNIFICANT CHANGE UP (ref 1.7–9.3)
MONOCYTES NFR BLD AUTO: 5.9 % — SIGNIFICANT CHANGE UP (ref 1.7–9.3)
MONOCYTES NFR BLD AUTO: 6.6 % — SIGNIFICANT CHANGE UP (ref 1.7–9.3)
MONOCYTES NFR BLD AUTO: 9.1 % — SIGNIFICANT CHANGE UP (ref 1.7–9.3)
NEUTROPHILS # BLD AUTO: 11.41 K/UL — HIGH (ref 1.4–6.5)
NEUTROPHILS # BLD AUTO: 15.2 K/UL — HIGH (ref 1.4–6.5)
NEUTROPHILS # BLD AUTO: 4.8 K/UL — SIGNIFICANT CHANGE UP (ref 1.4–6.5)
NEUTROPHILS # BLD AUTO: 4.95 K/UL — SIGNIFICANT CHANGE UP (ref 1.4–6.5)
NEUTROPHILS # BLD AUTO: 5.49 K/UL — SIGNIFICANT CHANGE UP (ref 1.4–6.5)
NEUTROPHILS # BLD AUTO: 5.77 K/UL — SIGNIFICANT CHANGE UP (ref 1.4–6.5)
NEUTROPHILS # BLD AUTO: 7.43 K/UL — HIGH (ref 1.4–6.5)
NEUTROPHILS # BLD AUTO: 8.96 K/UL — HIGH (ref 1.4–6.5)
NEUTROPHILS # BLD AUTO: 9.38 K/UL — HIGH (ref 1.4–6.5)
NEUTROPHILS # BLD AUTO: 9.65 K/UL — HIGH (ref 1.4–6.5)
NEUTROPHILS NFR BLD AUTO: 61.1 % — SIGNIFICANT CHANGE UP (ref 42.2–75.2)
NEUTROPHILS NFR BLD AUTO: 62.7 % — SIGNIFICANT CHANGE UP (ref 42.2–75.2)
NEUTROPHILS NFR BLD AUTO: 71.3 % — SIGNIFICANT CHANGE UP (ref 42.2–75.2)
NEUTROPHILS NFR BLD AUTO: 76.7 % — HIGH (ref 42.2–75.2)
NEUTROPHILS NFR BLD AUTO: 77.9 % — HIGH (ref 42.2–75.2)
NEUTROPHILS NFR BLD AUTO: 78 % — HIGH (ref 42.2–75.2)
NEUTROPHILS NFR BLD AUTO: 86.4 % — HIGH (ref 42.2–75.2)
NEUTROPHILS NFR BLD AUTO: 86.7 % — HIGH (ref 42.2–75.2)
NEUTROPHILS NFR BLD AUTO: 88.8 % — HIGH (ref 42.2–75.2)
NEUTROPHILS NFR BLD AUTO: 93.3 % — HIGH (ref 42.2–75.2)
NITRITE UR-MCNC: NEGATIVE — SIGNIFICANT CHANGE UP
NITRITE UR-MCNC: NEGATIVE — SIGNIFICANT CHANGE UP
NRBC # BLD: 0 /100 WBCS — SIGNIFICANT CHANGE UP (ref 0–0)
NT-PROBNP SERPL-SCNC: 327 PG/ML — HIGH (ref 0–300)
NT-PROBNP SERPL-SCNC: 432 PG/ML — HIGH (ref 0–300)
PCO2 BLDA: 96 MMHG — CRITICAL HIGH (ref 25–48)
PCO2 BLDV: 103 MMHG — HIGH (ref 39–42)
PCO2 BLDV: 117 MMHG — HIGH (ref 39–42)
PCO2 BLDV: 128 MMHG — HIGH (ref 39–42)
PCO2 BLDV: 85 MMHG — HIGH (ref 39–42)
PH BLDA: 7.32 — LOW (ref 7.35–7.45)
PH BLDV: 7.21 — LOW (ref 7.32–7.43)
PH BLDV: 7.22 — LOW (ref 7.32–7.43)
PH BLDV: 7.28 — LOW (ref 7.32–7.43)
PH BLDV: 7.29 — LOW (ref 7.32–7.43)
PH BLDV: 7.3 — LOW (ref 7.32–7.43)
PH BLDV: 7.34 — SIGNIFICANT CHANGE UP (ref 7.32–7.43)
PH UR: 6.5 — SIGNIFICANT CHANGE UP (ref 5–8)
PH UR: 7.5 — SIGNIFICANT CHANGE UP (ref 5–8)
PLATELET # BLD AUTO: 146 K/UL — SIGNIFICANT CHANGE UP (ref 130–400)
PLATELET # BLD AUTO: 149 K/UL — SIGNIFICANT CHANGE UP (ref 130–400)
PLATELET # BLD AUTO: 154 K/UL — SIGNIFICANT CHANGE UP (ref 130–400)
PLATELET # BLD AUTO: 164 K/UL — SIGNIFICANT CHANGE UP (ref 130–400)
PLATELET # BLD AUTO: 165 K/UL — SIGNIFICANT CHANGE UP (ref 130–400)
PLATELET # BLD AUTO: 167 K/UL — SIGNIFICANT CHANGE UP (ref 130–400)
PLATELET # BLD AUTO: 167 K/UL — SIGNIFICANT CHANGE UP (ref 130–400)
PLATELET # BLD AUTO: 172 K/UL — SIGNIFICANT CHANGE UP (ref 130–400)
PLATELET # BLD AUTO: 178 K/UL — SIGNIFICANT CHANGE UP (ref 130–400)
PLATELET # BLD AUTO: 190 K/UL — SIGNIFICANT CHANGE UP (ref 130–400)
PLATELET # BLD AUTO: 212 K/UL — SIGNIFICANT CHANGE UP (ref 130–400)
PLATELET # BLD AUTO: 214 K/UL — SIGNIFICANT CHANGE UP (ref 130–400)
PLATELET # BLD AUTO: 225 K/UL — SIGNIFICANT CHANGE UP (ref 130–400)
PLATELET # BLD AUTO: 232 K/UL — SIGNIFICANT CHANGE UP (ref 130–400)
PLATELET # BLD AUTO: 278 K/UL — SIGNIFICANT CHANGE UP (ref 130–400)
PO2 BLDA: 147 MMHG — HIGH (ref 83–108)
PO2 BLDV: 33 MMHG — SIGNIFICANT CHANGE UP
PO2 BLDV: 37 MMHG — SIGNIFICANT CHANGE UP
PO2 BLDV: 61 MMHG — SIGNIFICANT CHANGE UP
PO2 BLDV: 67 MMHG — SIGNIFICANT CHANGE UP
PO2 BLDV: 67 MMHG — SIGNIFICANT CHANGE UP
PO2 BLDV: 96 MMHG — SIGNIFICANT CHANGE UP
POTASSIUM BLDV-SCNC: 3.9 MMOL/L — SIGNIFICANT CHANGE UP (ref 3.5–5.1)
POTASSIUM BLDV-SCNC: 4.3 MMOL/L — SIGNIFICANT CHANGE UP (ref 3.5–5.1)
POTASSIUM BLDV-SCNC: 4.3 MMOL/L — SIGNIFICANT CHANGE UP (ref 3.5–5.1)
POTASSIUM BLDV-SCNC: 4.4 MMOL/L — SIGNIFICANT CHANGE UP (ref 3.5–5.1)
POTASSIUM BLDV-SCNC: 4.9 MMOL/L — SIGNIFICANT CHANGE UP (ref 3.5–5.1)
POTASSIUM BLDV-SCNC: 5.8 MMOL/L — HIGH (ref 3.5–5.1)
POTASSIUM SERPL-MCNC: 4.1 MMOL/L — SIGNIFICANT CHANGE UP (ref 3.5–5)
POTASSIUM SERPL-MCNC: 4.1 MMOL/L — SIGNIFICANT CHANGE UP (ref 3.5–5)
POTASSIUM SERPL-MCNC: 4.2 MMOL/L — SIGNIFICANT CHANGE UP (ref 3.5–5)
POTASSIUM SERPL-MCNC: 4.3 MMOL/L — SIGNIFICANT CHANGE UP (ref 3.5–5)
POTASSIUM SERPL-MCNC: 4.3 MMOL/L — SIGNIFICANT CHANGE UP (ref 3.5–5)
POTASSIUM SERPL-MCNC: 4.4 MMOL/L — SIGNIFICANT CHANGE UP (ref 3.5–5)
POTASSIUM SERPL-MCNC: 4.6 MMOL/L — SIGNIFICANT CHANGE UP (ref 3.5–5)
POTASSIUM SERPL-MCNC: 4.7 MMOL/L — SIGNIFICANT CHANGE UP (ref 3.5–5)
POTASSIUM SERPL-MCNC: 5 MMOL/L — SIGNIFICANT CHANGE UP (ref 3.5–5)
POTASSIUM SERPL-MCNC: 5.1 MMOL/L — HIGH (ref 3.5–5)
POTASSIUM SERPL-MCNC: 5.9 MMOL/L — HIGH (ref 3.5–5)
POTASSIUM SERPL-MCNC: 5.9 MMOL/L — HIGH (ref 3.5–5)
POTASSIUM SERPL-MCNC: SIGNIFICANT CHANGE UP MMOL/L (ref 3.5–5)
POTASSIUM SERPL-SCNC: 4.1 MMOL/L — SIGNIFICANT CHANGE UP (ref 3.5–5)
POTASSIUM SERPL-SCNC: 4.1 MMOL/L — SIGNIFICANT CHANGE UP (ref 3.5–5)
POTASSIUM SERPL-SCNC: 4.2 MMOL/L — SIGNIFICANT CHANGE UP (ref 3.5–5)
POTASSIUM SERPL-SCNC: 4.3 MMOL/L — SIGNIFICANT CHANGE UP (ref 3.5–5)
POTASSIUM SERPL-SCNC: 4.3 MMOL/L — SIGNIFICANT CHANGE UP (ref 3.5–5)
POTASSIUM SERPL-SCNC: 4.4 MMOL/L — SIGNIFICANT CHANGE UP (ref 3.5–5)
POTASSIUM SERPL-SCNC: 4.6 MMOL/L — SIGNIFICANT CHANGE UP (ref 3.5–5)
POTASSIUM SERPL-SCNC: 4.7 MMOL/L — SIGNIFICANT CHANGE UP (ref 3.5–5)
POTASSIUM SERPL-SCNC: 5 MMOL/L — SIGNIFICANT CHANGE UP (ref 3.5–5)
POTASSIUM SERPL-SCNC: 5.1 MMOL/L — HIGH (ref 3.5–5)
POTASSIUM SERPL-SCNC: 5.9 MMOL/L — HIGH (ref 3.5–5)
POTASSIUM SERPL-SCNC: 5.9 MMOL/L — HIGH (ref 3.5–5)
POTASSIUM SERPL-SCNC: SIGNIFICANT CHANGE UP MMOL/L (ref 3.5–5)
PROCALCITONIN SERPL-MCNC: 0.33 NG/ML — HIGH (ref 0.02–0.1)
PROCALCITONIN SERPL-MCNC: 10.3 NG/ML — HIGH (ref 0.02–0.1)
PROT SERPL-MCNC: 4.9 G/DL — LOW (ref 6–8)
PROT SERPL-MCNC: 5.2 G/DL — LOW (ref 6–8)
PROT SERPL-MCNC: 5.4 G/DL — LOW (ref 6–8)
PROT SERPL-MCNC: 5.4 G/DL — LOW (ref 6–8)
PROT SERPL-MCNC: 5.6 G/DL — LOW (ref 6–8)
PROT SERPL-MCNC: 5.7 G/DL — LOW (ref 6–8)
PROT SERPL-MCNC: 5.9 G/DL — LOW (ref 6–8)
PROT SERPL-MCNC: 5.9 G/DL — LOW (ref 6–8)
PROT SERPL-MCNC: 6.2 G/DL — SIGNIFICANT CHANGE UP (ref 6–8)
PROT SERPL-MCNC: 6.5 G/DL — SIGNIFICANT CHANGE UP (ref 6–8)
PROT SERPL-MCNC: 6.6 G/DL — SIGNIFICANT CHANGE UP (ref 6–8)
PROT SERPL-MCNC: 6.7 G/DL — SIGNIFICANT CHANGE UP (ref 6–8)
PROT SERPL-MCNC: 6.9 G/DL — SIGNIFICANT CHANGE UP (ref 6–8)
PROT SERPL-MCNC: 7.1 G/DL — SIGNIFICANT CHANGE UP (ref 6–8)
PROT UR-MCNC: ABNORMAL
PROT UR-MCNC: ABNORMAL
PROTHROM AB SERPL-ACNC: 10.7 SEC — SIGNIFICANT CHANGE UP (ref 9.95–12.87)
RAPID RVP RESULT: SIGNIFICANT CHANGE UP
RBC # BLD: 2.9 M/UL — LOW (ref 4.2–5.4)
RBC # BLD: 2.9 M/UL — LOW (ref 4.2–5.4)
RBC # BLD: 2.92 M/UL — LOW (ref 4.2–5.4)
RBC # BLD: 2.95 M/UL — LOW (ref 4.2–5.4)
RBC # BLD: 3.01 M/UL — LOW (ref 4.2–5.4)
RBC # BLD: 3.07 M/UL — LOW (ref 4.2–5.4)
RBC # BLD: 3.28 M/UL — LOW (ref 4.2–5.4)
RBC # BLD: 3.29 M/UL — LOW (ref 4.2–5.4)
RBC # BLD: 3.38 M/UL — LOW (ref 4.2–5.4)
RBC # BLD: 3.4 M/UL — LOW (ref 4.2–5.4)
RBC # BLD: 3.46 M/UL — LOW (ref 4.2–5.4)
RBC # BLD: 3.5 M/UL — LOW (ref 4.2–5.4)
RBC # BLD: 3.51 M/UL — LOW (ref 4.2–5.4)
RBC # BLD: 3.62 M/UL — LOW (ref 4.2–5.4)
RBC # BLD: 3.89 M/UL — LOW (ref 4.2–5.4)
RBC # FLD: 13.3 % — SIGNIFICANT CHANGE UP (ref 11.5–14.5)
RBC # FLD: 13.4 % — SIGNIFICANT CHANGE UP (ref 11.5–14.5)
RBC # FLD: 13.4 % — SIGNIFICANT CHANGE UP (ref 11.5–14.5)
RBC # FLD: 13.5 % — SIGNIFICANT CHANGE UP (ref 11.5–14.5)
RBC # FLD: 13.6 % — SIGNIFICANT CHANGE UP (ref 11.5–14.5)
RBC # FLD: 13.7 % — SIGNIFICANT CHANGE UP (ref 11.5–14.5)
RBC # FLD: 13.7 % — SIGNIFICANT CHANGE UP (ref 11.5–14.5)
RBC # FLD: 13.8 % — SIGNIFICANT CHANGE UP (ref 11.5–14.5)
RBC CASTS # UR COMP ASSIST: 2 /HPF — SIGNIFICANT CHANGE UP (ref 0–4)
RBC CASTS # UR COMP ASSIST: 7 /HPF — HIGH (ref 0–4)
RSV RNA NPH QL NAA+NON-PROBE: SIGNIFICANT CHANGE UP
S PNEUM AG UR QL: NEGATIVE — SIGNIFICANT CHANGE UP
SALICYLATES SERPL-MCNC: <0.3 MG/DL — LOW (ref 4–30)
SAO2 % BLDA: 99.3 % — HIGH (ref 94–98)
SAO2 % BLDV: 52.4 % — SIGNIFICANT CHANGE UP
SAO2 % BLDV: 62.4 % — SIGNIFICANT CHANGE UP
SAO2 % BLDV: 92.1 % — SIGNIFICANT CHANGE UP
SAO2 % BLDV: 94.3 % — SIGNIFICANT CHANGE UP
SAO2 % BLDV: 94.7 % — SIGNIFICANT CHANGE UP
SAO2 % BLDV: 99 % — SIGNIFICANT CHANGE UP
SARS-COV-2 RNA SPEC QL NAA+PROBE: SIGNIFICANT CHANGE UP
SODIUM SERPL-SCNC: 139 MMOL/L — SIGNIFICANT CHANGE UP (ref 135–146)
SODIUM SERPL-SCNC: 140 MMOL/L — SIGNIFICANT CHANGE UP (ref 135–146)
SODIUM SERPL-SCNC: 141 MMOL/L — SIGNIFICANT CHANGE UP (ref 135–146)
SODIUM SERPL-SCNC: 142 MMOL/L — SIGNIFICANT CHANGE UP (ref 135–146)
SODIUM SERPL-SCNC: 142 MMOL/L — SIGNIFICANT CHANGE UP (ref 135–146)
SODIUM SERPL-SCNC: 143 MMOL/L — SIGNIFICANT CHANGE UP (ref 135–146)
SODIUM SERPL-SCNC: 144 MMOL/L — SIGNIFICANT CHANGE UP (ref 135–146)
SODIUM SERPL-SCNC: 145 MMOL/L — SIGNIFICANT CHANGE UP (ref 135–146)
SODIUM SERPL-SCNC: 146 MMOL/L — SIGNIFICANT CHANGE UP (ref 135–146)
SODIUM SERPL-SCNC: 146 MMOL/L — SIGNIFICANT CHANGE UP (ref 135–146)
SP GR SPEC: 1.01 — SIGNIFICANT CHANGE UP (ref 1.01–1.03)
SP GR SPEC: >1.05 (ref 1.01–1.03)
SPECIMEN SOURCE: SIGNIFICANT CHANGE UP
TROPONIN T SERPL-MCNC: <0.01 NG/ML — SIGNIFICANT CHANGE UP
UROBILINOGEN FLD QL: SIGNIFICANT CHANGE UP
UROBILINOGEN FLD QL: SIGNIFICANT CHANGE UP
WBC # BLD: 10.34 K/UL — SIGNIFICANT CHANGE UP (ref 4.8–10.8)
WBC # BLD: 10.56 K/UL — SIGNIFICANT CHANGE UP (ref 4.8–10.8)
WBC # BLD: 11.49 K/UL — HIGH (ref 4.8–10.8)
WBC # BLD: 12.25 K/UL — HIGH (ref 4.8–10.8)
WBC # BLD: 13.15 K/UL — HIGH (ref 4.8–10.8)
WBC # BLD: 13.7 K/UL — HIGH (ref 4.8–10.8)
WBC # BLD: 16.4 K/UL — HIGH (ref 4.8–10.8)
WBC # BLD: 17.6 K/UL — HIGH (ref 4.8–10.8)
WBC # BLD: 20.72 K/UL — HIGH (ref 4.8–10.8)
WBC # BLD: 21.64 K/UL — HIGH (ref 4.8–10.8)
WBC # BLD: 7.53 K/UL — SIGNIFICANT CHANGE UP (ref 4.8–10.8)
WBC # BLD: 7.65 K/UL — SIGNIFICANT CHANGE UP (ref 4.8–10.8)
WBC # BLD: 7.69 K/UL — SIGNIFICANT CHANGE UP (ref 4.8–10.8)
WBC # BLD: 8.11 K/UL — SIGNIFICANT CHANGE UP (ref 4.8–10.8)
WBC # BLD: 9.53 K/UL — SIGNIFICANT CHANGE UP (ref 4.8–10.8)
WBC # FLD AUTO: 10.34 K/UL — SIGNIFICANT CHANGE UP (ref 4.8–10.8)
WBC # FLD AUTO: 10.56 K/UL — SIGNIFICANT CHANGE UP (ref 4.8–10.8)
WBC # FLD AUTO: 11.49 K/UL — HIGH (ref 4.8–10.8)
WBC # FLD AUTO: 12.25 K/UL — HIGH (ref 4.8–10.8)
WBC # FLD AUTO: 13.15 K/UL — HIGH (ref 4.8–10.8)
WBC # FLD AUTO: 13.7 K/UL — HIGH (ref 4.8–10.8)
WBC # FLD AUTO: 16.4 K/UL — HIGH (ref 4.8–10.8)
WBC # FLD AUTO: 17.6 K/UL — HIGH (ref 4.8–10.8)
WBC # FLD AUTO: 20.72 K/UL — HIGH (ref 4.8–10.8)
WBC # FLD AUTO: 21.64 K/UL — HIGH (ref 4.8–10.8)
WBC # FLD AUTO: 7.53 K/UL — SIGNIFICANT CHANGE UP (ref 4.8–10.8)
WBC # FLD AUTO: 7.65 K/UL — SIGNIFICANT CHANGE UP (ref 4.8–10.8)
WBC # FLD AUTO: 7.69 K/UL — SIGNIFICANT CHANGE UP (ref 4.8–10.8)
WBC # FLD AUTO: 8.11 K/UL — SIGNIFICANT CHANGE UP (ref 4.8–10.8)
WBC # FLD AUTO: 9.53 K/UL — SIGNIFICANT CHANGE UP (ref 4.8–10.8)
WBC UR QL: 0 /HPF — SIGNIFICANT CHANGE UP (ref 0–5)
WBC UR QL: 6 /HPF — HIGH (ref 0–5)

## 2022-01-01 PROCEDURE — 99233 SBSQ HOSP IP/OBS HIGH 50: CPT

## 2022-01-01 PROCEDURE — 72170 X-RAY EXAM OF PELVIS: CPT | Mod: 26

## 2022-01-01 PROCEDURE — 71045 X-RAY EXAM CHEST 1 VIEW: CPT | Mod: 26

## 2022-01-01 PROCEDURE — 70450 CT HEAD/BRAIN W/O DYE: CPT | Mod: 26,MA

## 2022-01-01 PROCEDURE — 99291 CRITICAL CARE FIRST HOUR: CPT

## 2022-01-01 PROCEDURE — 74176 CT ABD & PELVIS W/O CONTRAST: CPT | Mod: 26,MA

## 2022-01-01 PROCEDURE — 99239 HOSP IP/OBS DSCHRG MGMT >30: CPT

## 2022-01-01 PROCEDURE — 93010 ELECTROCARDIOGRAM REPORT: CPT

## 2022-01-01 PROCEDURE — 99284 EMERGENCY DEPT VISIT MOD MDM: CPT | Mod: CS

## 2022-01-01 PROCEDURE — 99232 SBSQ HOSP IP/OBS MODERATE 35: CPT

## 2022-01-01 PROCEDURE — 74177 CT ABD & PELVIS W/CONTRAST: CPT | Mod: 26,MA

## 2022-01-01 PROCEDURE — 71250 CT THORAX DX C-: CPT | Mod: 26,MA

## 2022-01-01 PROCEDURE — 99348 HOME/RES VST EST LOW MDM 30: CPT | Mod: 95

## 2022-01-01 PROCEDURE — 99223 1ST HOSP IP/OBS HIGH 75: CPT | Mod: AI

## 2022-01-01 PROCEDURE — 99497 ADVNCD CARE PLAN 30 MIN: CPT | Mod: 25

## 2022-01-01 PROCEDURE — 99223 1ST HOSP IP/OBS HIGH 75: CPT

## 2022-01-01 PROCEDURE — 72125 CT NECK SPINE W/O DYE: CPT | Mod: 26,MA

## 2022-01-01 PROCEDURE — 99222 1ST HOSP IP/OBS MODERATE 55: CPT

## 2022-01-01 RX ORDER — MORPHINE SULFATE 50 MG/1
4 CAPSULE, EXTENDED RELEASE ORAL
Refills: 0 | Status: DISCONTINUED | OUTPATIENT
Start: 2022-01-01 | End: 2022-01-01

## 2022-01-01 RX ORDER — TIOTROPIUM BROMIDE 18 UG/1
1 CAPSULE ORAL; RESPIRATORY (INHALATION) DAILY
Refills: 0 | Status: DISCONTINUED | OUTPATIENT
Start: 2022-01-01 | End: 2022-01-01

## 2022-01-01 RX ORDER — UMECLIDINIUM 62.5 UG/1
62.5 AEROSOL, POWDER ORAL DAILY
Qty: 1 | Refills: 1 | Status: ACTIVE | COMMUNITY

## 2022-01-01 RX ORDER — FOLIC ACID 0.8 MG
1 TABLET ORAL
Qty: 14 | Refills: 0
Start: 2022-01-01 | End: 2022-01-01

## 2022-01-01 RX ORDER — MORPHINE SULFATE 50 MG/1
6 CAPSULE, EXTENDED RELEASE ORAL ONCE
Refills: 0 | Status: DISCONTINUED | OUTPATIENT
Start: 2022-01-01 | End: 2022-01-01

## 2022-01-01 RX ORDER — ACETAMINOPHEN 500 MG
325 TABLET ORAL ONCE
Refills: 0 | Status: COMPLETED | OUTPATIENT
Start: 2022-01-01 | End: 2022-01-01

## 2022-01-01 RX ORDER — ONDANSETRON 8 MG/1
4 TABLET, FILM COATED ORAL ONCE
Refills: 0 | Status: COMPLETED | OUTPATIENT
Start: 2022-01-01 | End: 2022-01-01

## 2022-01-01 RX ORDER — AMLODIPINE BESYLATE 2.5 MG/1
10 TABLET ORAL DAILY
Refills: 0 | Status: DISCONTINUED | OUTPATIENT
Start: 2022-01-01 | End: 2022-01-01

## 2022-01-01 RX ORDER — VANCOMYCIN HCL 1 G
1000 VIAL (EA) INTRAVENOUS ONCE
Refills: 0 | Status: COMPLETED | OUTPATIENT
Start: 2022-01-01 | End: 2022-01-01

## 2022-01-01 RX ORDER — ACETAMINOPHEN 500 MG
650 TABLET ORAL ONCE
Refills: 0 | Status: COMPLETED | OUTPATIENT
Start: 2022-01-01 | End: 2022-01-01

## 2022-01-01 RX ORDER — ALPRAZOLAM 0.25 MG
0.25 TABLET ORAL
Refills: 0 | Status: DISCONTINUED | OUTPATIENT
Start: 2022-01-01 | End: 2022-01-01

## 2022-01-01 RX ORDER — NALOXONE HYDROCHLORIDE 4 MG/.1ML
1 SPRAY NASAL ONCE
Refills: 0 | Status: COMPLETED | OUTPATIENT
Start: 2022-01-01 | End: 2022-01-01

## 2022-01-01 RX ORDER — HEPARIN SODIUM 5000 [USP'U]/ML
5000 INJECTION INTRAVENOUS; SUBCUTANEOUS EVERY 8 HOURS
Refills: 0 | Status: DISCONTINUED | OUTPATIENT
Start: 2022-01-01 | End: 2022-01-01

## 2022-01-01 RX ORDER — ALPRAZOLAM 0.25 MG
0.25 TABLET ORAL AT BEDTIME
Refills: 0 | Status: DISCONTINUED | OUTPATIENT
Start: 2022-01-01 | End: 2022-01-01

## 2022-01-01 RX ORDER — DEXTROSE 50 % IN WATER 50 %
50 SYRINGE (ML) INTRAVENOUS ONCE
Refills: 0 | Status: COMPLETED | OUTPATIENT
Start: 2022-01-01 | End: 2022-01-01

## 2022-01-01 RX ORDER — INSULIN HUMAN 100 [IU]/ML
10 INJECTION, SOLUTION SUBCUTANEOUS ONCE
Refills: 0 | Status: COMPLETED | OUTPATIENT
Start: 2022-01-01 | End: 2022-01-01

## 2022-01-01 RX ORDER — IPRATROPIUM/ALBUTEROL SULFATE 18-103MCG
3 AEROSOL WITH ADAPTER (GRAM) INHALATION EVERY 4 HOURS
Refills: 0 | Status: DISCONTINUED | OUTPATIENT
Start: 2022-01-01 | End: 2022-01-01

## 2022-01-01 RX ORDER — SODIUM CHLORIDE 9 MG/ML
500 INJECTION INTRAMUSCULAR; INTRAVENOUS; SUBCUTANEOUS ONCE
Refills: 0 | Status: COMPLETED | OUTPATIENT
Start: 2022-01-01 | End: 2022-01-01

## 2022-01-01 RX ORDER — AMPICILLIN SODIUM AND SULBACTAM SODIUM 250; 125 MG/ML; MG/ML
INJECTION, POWDER, FOR SUSPENSION INTRAMUSCULAR; INTRAVENOUS
Refills: 0 | Status: DISCONTINUED | OUTPATIENT
Start: 2022-01-01 | End: 2022-01-01

## 2022-01-01 RX ORDER — PANTOPRAZOLE SODIUM 20 MG/1
40 TABLET, DELAYED RELEASE ORAL
Refills: 0 | Status: DISCONTINUED | OUTPATIENT
Start: 2022-01-01 | End: 2022-01-01

## 2022-01-01 RX ORDER — AMPICILLIN SODIUM AND SULBACTAM SODIUM 250; 125 MG/ML; MG/ML
3 INJECTION, POWDER, FOR SUSPENSION INTRAMUSCULAR; INTRAVENOUS ONCE
Refills: 0 | Status: COMPLETED | OUTPATIENT
Start: 2022-01-01 | End: 2022-01-01

## 2022-01-01 RX ORDER — SODIUM CHLORIDE 9 MG/ML
1000 INJECTION, SOLUTION INTRAVENOUS ONCE
Refills: 0 | Status: COMPLETED | OUTPATIENT
Start: 2022-01-01 | End: 2022-01-01

## 2022-01-01 RX ORDER — FOLIC ACID 0.8 MG
1 TABLET ORAL DAILY
Refills: 0 | Status: DISCONTINUED | OUTPATIENT
Start: 2022-01-01 | End: 2022-01-01

## 2022-01-01 RX ORDER — UMECLIDINIUM BROMIDE AND VILANTEROL TRIFENATATE 62.5; 25 UG/1; UG/1
62.5-25 POWDER RESPIRATORY (INHALATION)
Refills: 0 | Status: DISCONTINUED | COMMUNITY
End: 2022-01-01

## 2022-01-01 RX ORDER — EPINEPHRINE 0.3 MG/.3ML
1 INJECTION INTRAMUSCULAR; SUBCUTANEOUS ONCE
Refills: 0 | Status: COMPLETED | OUTPATIENT
Start: 2022-01-01 | End: 2022-01-01

## 2022-01-01 RX ORDER — CEFEPIME 1 G/1
2000 INJECTION, POWDER, FOR SOLUTION INTRAMUSCULAR; INTRAVENOUS ONCE
Refills: 0 | Status: COMPLETED | OUTPATIENT
Start: 2022-01-01 | End: 2022-01-01

## 2022-01-01 RX ORDER — ACETAMINOPHEN 500 MG
650 TABLET ORAL EVERY 6 HOURS
Refills: 0 | Status: DISCONTINUED | OUTPATIENT
Start: 2022-01-01 | End: 2022-01-01

## 2022-01-01 RX ORDER — IPRATROPIUM/ALBUTEROL SULFATE 18-103MCG
3 AEROSOL WITH ADAPTER (GRAM) INHALATION EVERY 6 HOURS
Refills: 0 | Status: DISCONTINUED | OUTPATIENT
Start: 2022-01-01 | End: 2022-01-01

## 2022-01-01 RX ORDER — AMPICILLIN SODIUM AND SULBACTAM SODIUM 250; 125 MG/ML; MG/ML
3 INJECTION, POWDER, FOR SUSPENSION INTRAMUSCULAR; INTRAVENOUS EVERY 6 HOURS
Refills: 0 | Status: DISCONTINUED | OUTPATIENT
Start: 2022-01-01 | End: 2022-01-01

## 2022-01-01 RX ORDER — BUDESONIDE AND FORMOTEROL FUMARATE DIHYDRATE 160; 4.5 UG/1; UG/1
2 AEROSOL RESPIRATORY (INHALATION)
Refills: 0 | Status: DISCONTINUED | OUTPATIENT
Start: 2022-01-01 | End: 2022-01-01

## 2022-01-01 RX ORDER — ALBUTEROL 90 UG/1
2 AEROSOL, METERED ORAL EVERY 4 HOURS
Refills: 0 | Status: DISCONTINUED | OUTPATIENT
Start: 2022-01-01 | End: 2022-01-01

## 2022-01-01 RX ORDER — SODIUM ZIRCONIUM CYCLOSILICATE 10 G/10G
5 POWDER, FOR SUSPENSION ORAL DAILY
Refills: 0 | Status: DISCONTINUED | OUTPATIENT
Start: 2022-01-01 | End: 2022-01-01

## 2022-01-01 RX ORDER — PANTOPRAZOLE SODIUM 20 MG/1
40 TABLET, DELAYED RELEASE ORAL AT BEDTIME
Refills: 0 | Status: DISCONTINUED | OUTPATIENT
Start: 2022-01-01 | End: 2022-01-01

## 2022-01-01 RX ORDER — CEFEPIME 1 G/1
1000 INJECTION, POWDER, FOR SOLUTION INTRAMUSCULAR; INTRAVENOUS ONCE
Refills: 0 | Status: COMPLETED | OUTPATIENT
Start: 2022-01-01 | End: 2022-01-01

## 2022-01-01 RX ORDER — PIPERACILLIN AND TAZOBACTAM 4; .5 G/20ML; G/20ML
3.38 INJECTION, POWDER, LYOPHILIZED, FOR SOLUTION INTRAVENOUS EVERY 6 HOURS
Refills: 0 | Status: DISCONTINUED | OUTPATIENT
Start: 2022-01-01 | End: 2022-01-01

## 2022-01-01 RX ORDER — BUDESONIDE AND FORMOTEROL FUMARATE DIHYDRATE 160; 4.5 UG/1; UG/1
160-4.5 AEROSOL RESPIRATORY (INHALATION) TWICE DAILY
Qty: 1 | Refills: 1 | Status: ACTIVE | COMMUNITY

## 2022-01-01 RX ORDER — NOREPINEPHRINE BITARTRATE/D5W 8 MG/250ML
0.05 PLASTIC BAG, INJECTION (ML) INTRAVENOUS
Qty: 8 | Refills: 0 | Status: DISCONTINUED | OUTPATIENT
Start: 2022-01-01 | End: 2022-01-01

## 2022-01-01 RX ORDER — SODIUM CHLORIDE 9 MG/ML
1000 INJECTION, SOLUTION INTRAVENOUS
Refills: 0 | Status: DISCONTINUED | OUTPATIENT
Start: 2022-01-01 | End: 2022-01-01

## 2022-01-01 RX ORDER — ONDANSETRON 8 MG/1
4 TABLET, FILM COATED ORAL EVERY 6 HOURS
Refills: 0 | Status: DISCONTINUED | OUTPATIENT
Start: 2022-01-01 | End: 2022-01-01

## 2022-01-01 RX ORDER — LANOLIN ALCOHOL/MO/W.PET/CERES
5 CREAM (GRAM) TOPICAL AT BEDTIME
Refills: 0 | Status: DISCONTINUED | OUTPATIENT
Start: 2022-01-01 | End: 2022-01-01

## 2022-01-01 RX ORDER — SODIUM CHLORIDE 9 MG/ML
1000 INJECTION INTRAMUSCULAR; INTRAVENOUS; SUBCUTANEOUS
Refills: 0 | Status: DISCONTINUED | OUTPATIENT
Start: 2022-01-01 | End: 2022-01-01

## 2022-01-01 RX ORDER — DEXTROSE 50 % IN WATER 50 %
50 SYRINGE (ML) INTRAVENOUS ONCE
Refills: 0 | Status: DISCONTINUED | OUTPATIENT
Start: 2022-01-01 | End: 2022-01-01

## 2022-01-01 RX ORDER — ENOXAPARIN SODIUM 100 MG/ML
40 INJECTION SUBCUTANEOUS EVERY 24 HOURS
Refills: 0 | Status: DISCONTINUED | OUTPATIENT
Start: 2022-01-01 | End: 2022-01-01

## 2022-01-01 RX ORDER — PIPERACILLIN AND TAZOBACTAM 4; .5 G/20ML; G/20ML
3.38 INJECTION, POWDER, LYOPHILIZED, FOR SOLUTION INTRAVENOUS ONCE
Refills: 0 | Status: DISCONTINUED | OUTPATIENT
Start: 2022-01-01 | End: 2022-01-01

## 2022-01-01 RX ORDER — INSULIN HUMAN 100 [IU]/ML
10 INJECTION, SOLUTION SUBCUTANEOUS ONCE
Refills: 0 | Status: DISCONTINUED | OUTPATIENT
Start: 2022-01-01 | End: 2022-01-01

## 2022-01-01 RX ORDER — AMPICILLIN SODIUM AND SULBACTAM SODIUM 250; 125 MG/ML; MG/ML
3 INJECTION, POWDER, FOR SUSPENSION INTRAMUSCULAR; INTRAVENOUS EVERY 6 HOURS
Refills: 0 | Status: ACTIVE | OUTPATIENT
Start: 2022-01-01

## 2022-01-01 RX ORDER — AMPICILLIN SODIUM AND SULBACTAM SODIUM 250; 125 MG/ML; MG/ML
INJECTION, POWDER, FOR SUSPENSION INTRAMUSCULAR; INTRAVENOUS
Refills: 0 | Status: ACTIVE | OUTPATIENT
Start: 2022-01-01

## 2022-01-01 RX ADMIN — MORPHINE SULFATE 4 MILLIGRAM(S): 50 CAPSULE, EXTENDED RELEASE ORAL at 13:17

## 2022-01-01 RX ADMIN — Medication 0.25 MILLIGRAM(S): at 21:03

## 2022-01-01 RX ADMIN — AMPICILLIN SODIUM AND SULBACTAM SODIUM 200 GRAM(S): 250; 125 INJECTION, POWDER, FOR SUSPENSION INTRAMUSCULAR; INTRAVENOUS at 14:26

## 2022-01-01 RX ADMIN — SODIUM CHLORIDE 1000 MILLILITER(S): 9 INJECTION, SOLUTION INTRAVENOUS at 21:00

## 2022-01-01 RX ADMIN — Medication 40 MILLIGRAM(S): at 09:05

## 2022-01-01 RX ADMIN — AMLODIPINE BESYLATE 10 MILLIGRAM(S): 2.5 TABLET ORAL at 05:01

## 2022-01-01 RX ADMIN — PANTOPRAZOLE SODIUM 40 MILLIGRAM(S): 20 TABLET, DELAYED RELEASE ORAL at 22:18

## 2022-01-01 RX ADMIN — Medication 3 MILLILITER(S): at 20:32

## 2022-01-01 RX ADMIN — Medication 1 MILLIGRAM(S): at 11:19

## 2022-01-01 RX ADMIN — AMPICILLIN SODIUM AND SULBACTAM SODIUM 200 GRAM(S): 250; 125 INJECTION, POWDER, FOR SUSPENSION INTRAMUSCULAR; INTRAVENOUS at 22:17

## 2022-01-01 RX ADMIN — Medication 5 MILLIGRAM(S): at 05:10

## 2022-01-01 RX ADMIN — HEPARIN SODIUM 5000 UNIT(S): 5000 INJECTION INTRAVENOUS; SUBCUTANEOUS at 13:24

## 2022-01-01 RX ADMIN — AMPICILLIN SODIUM AND SULBACTAM SODIUM 200 GRAM(S): 250; 125 INJECTION, POWDER, FOR SUSPENSION INTRAMUSCULAR; INTRAVENOUS at 07:57

## 2022-01-01 RX ADMIN — AMPICILLIN SODIUM AND SULBACTAM SODIUM 200 GRAM(S): 250; 125 INJECTION, POWDER, FOR SUSPENSION INTRAMUSCULAR; INTRAVENOUS at 17:30

## 2022-01-01 RX ADMIN — BUDESONIDE AND FORMOTEROL FUMARATE DIHYDRATE 2 PUFF(S): 160; 4.5 AEROSOL RESPIRATORY (INHALATION) at 09:21

## 2022-01-01 RX ADMIN — AMPICILLIN SODIUM AND SULBACTAM SODIUM 200 GRAM(S): 250; 125 INJECTION, POWDER, FOR SUSPENSION INTRAMUSCULAR; INTRAVENOUS at 02:22

## 2022-01-01 RX ADMIN — Medication 0.25 MILLIGRAM(S): at 19:50

## 2022-01-01 RX ADMIN — Medication 10 MILLIGRAM(S): at 11:20

## 2022-01-01 RX ADMIN — Medication 3 MILLILITER(S): at 13:17

## 2022-01-01 RX ADMIN — AMPICILLIN SODIUM AND SULBACTAM SODIUM 200 GRAM(S): 250; 125 INJECTION, POWDER, FOR SUSPENSION INTRAMUSCULAR; INTRAVENOUS at 11:41

## 2022-01-01 RX ADMIN — ONDANSETRON 4 MILLIGRAM(S): 8 TABLET, FILM COATED ORAL at 13:05

## 2022-01-01 RX ADMIN — AMPICILLIN SODIUM AND SULBACTAM SODIUM 200 GRAM(S): 250; 125 INJECTION, POWDER, FOR SUSPENSION INTRAMUSCULAR; INTRAVENOUS at 05:18

## 2022-01-01 RX ADMIN — NALOXONE HYDROCHLORIDE 1 MILLIGRAM(S): 4 SPRAY NASAL at 11:51

## 2022-01-01 RX ADMIN — Medication 10 MILLIGRAM(S): at 11:03

## 2022-01-01 RX ADMIN — Medication 2 MILLIGRAM(S): at 13:01

## 2022-01-01 RX ADMIN — AMPICILLIN SODIUM AND SULBACTAM SODIUM 200 GRAM(S): 250; 125 INJECTION, POWDER, FOR SUSPENSION INTRAMUSCULAR; INTRAVENOUS at 13:24

## 2022-01-01 RX ADMIN — AMPICILLIN SODIUM AND SULBACTAM SODIUM 200 GRAM(S): 250; 125 INJECTION, POWDER, FOR SUSPENSION INTRAMUSCULAR; INTRAVENOUS at 15:27

## 2022-01-01 RX ADMIN — Medication 3 MILLILITER(S): at 08:20

## 2022-01-01 RX ADMIN — Medication 3 MILLILITER(S): at 05:21

## 2022-01-01 RX ADMIN — PANTOPRAZOLE SODIUM 40 MILLIGRAM(S): 20 TABLET, DELAYED RELEASE ORAL at 21:01

## 2022-01-01 RX ADMIN — BUDESONIDE AND FORMOTEROL FUMARATE DIHYDRATE 2 PUFF(S): 160; 4.5 AEROSOL RESPIRATORY (INHALATION) at 07:34

## 2022-01-01 RX ADMIN — Medication 125 MILLIGRAM(S): at 11:50

## 2022-01-01 RX ADMIN — HEPARIN SODIUM 5000 UNIT(S): 5000 INJECTION INTRAVENOUS; SUBCUTANEOUS at 05:13

## 2022-01-01 RX ADMIN — HEPARIN SODIUM 5000 UNIT(S): 5000 INJECTION INTRAVENOUS; SUBCUTANEOUS at 16:16

## 2022-01-01 RX ADMIN — Medication 3 MILLILITER(S): at 01:15

## 2022-01-01 RX ADMIN — HEPARIN SODIUM 5000 UNIT(S): 5000 INJECTION INTRAVENOUS; SUBCUTANEOUS at 21:01

## 2022-01-01 RX ADMIN — Medication 1 MILLIGRAM(S): at 11:40

## 2022-01-01 RX ADMIN — Medication 10 MILLIGRAM(S): at 11:17

## 2022-01-01 RX ADMIN — ONDANSETRON 4 MILLIGRAM(S): 8 TABLET, FILM COATED ORAL at 18:23

## 2022-01-01 RX ADMIN — AMPICILLIN SODIUM AND SULBACTAM SODIUM 200 GRAM(S): 250; 125 INJECTION, POWDER, FOR SUSPENSION INTRAMUSCULAR; INTRAVENOUS at 13:16

## 2022-01-01 RX ADMIN — BUDESONIDE AND FORMOTEROL FUMARATE DIHYDRATE 2 PUFF(S): 160; 4.5 AEROSOL RESPIRATORY (INHALATION) at 11:05

## 2022-01-01 RX ADMIN — AMPICILLIN SODIUM AND SULBACTAM SODIUM 200 GRAM(S): 250; 125 INJECTION, POWDER, FOR SUSPENSION INTRAMUSCULAR; INTRAVENOUS at 05:45

## 2022-01-01 RX ADMIN — Medication 325 MILLIGRAM(S): at 12:45

## 2022-01-01 RX ADMIN — Medication 3 MILLILITER(S): at 07:58

## 2022-01-01 RX ADMIN — SODIUM CHLORIDE 1000 MILLILITER(S): 9 INJECTION, SOLUTION INTRAVENOUS at 17:54

## 2022-01-01 RX ADMIN — Medication 0.25 MILLIGRAM(S): at 11:24

## 2022-01-01 RX ADMIN — Medication 5 MILLIGRAM(S): at 21:23

## 2022-01-01 RX ADMIN — AMPICILLIN SODIUM AND SULBACTAM SODIUM 200 GRAM(S): 250; 125 INJECTION, POWDER, FOR SUSPENSION INTRAMUSCULAR; INTRAVENOUS at 07:36

## 2022-01-01 RX ADMIN — AMPICILLIN SODIUM AND SULBACTAM SODIUM 200 GRAM(S): 250; 125 INJECTION, POWDER, FOR SUSPENSION INTRAMUSCULAR; INTRAVENOUS at 20:36

## 2022-01-01 RX ADMIN — Medication 3 MILLILITER(S): at 09:15

## 2022-01-01 RX ADMIN — AMPICILLIN SODIUM AND SULBACTAM SODIUM 200 GRAM(S): 250; 125 INJECTION, POWDER, FOR SUSPENSION INTRAMUSCULAR; INTRAVENOUS at 01:38

## 2022-01-01 RX ADMIN — Medication 0.25 MILLIGRAM(S): at 22:37

## 2022-01-01 RX ADMIN — AMPICILLIN SODIUM AND SULBACTAM SODIUM 200 GRAM(S): 250; 125 INJECTION, POWDER, FOR SUSPENSION INTRAMUSCULAR; INTRAVENOUS at 22:35

## 2022-01-01 RX ADMIN — Medication 3 MILLILITER(S): at 20:52

## 2022-01-01 RX ADMIN — INSULIN HUMAN 10 UNIT(S): 100 INJECTION, SOLUTION SUBCUTANEOUS at 02:54

## 2022-01-01 RX ADMIN — Medication 5 MILLIGRAM(S): at 22:18

## 2022-01-01 RX ADMIN — Medication 3 MILLILITER(S): at 20:54

## 2022-01-01 RX ADMIN — HEPARIN SODIUM 5000 UNIT(S): 5000 INJECTION INTRAVENOUS; SUBCUTANEOUS at 05:10

## 2022-01-01 RX ADMIN — HEPARIN SODIUM 5000 UNIT(S): 5000 INJECTION INTRAVENOUS; SUBCUTANEOUS at 21:22

## 2022-01-01 RX ADMIN — ENOXAPARIN SODIUM 40 MILLIGRAM(S): 100 INJECTION SUBCUTANEOUS at 05:45

## 2022-01-01 RX ADMIN — HEPARIN SODIUM 5000 UNIT(S): 5000 INJECTION INTRAVENOUS; SUBCUTANEOUS at 22:18

## 2022-01-01 RX ADMIN — AMPICILLIN SODIUM AND SULBACTAM SODIUM 200 GRAM(S): 250; 125 INJECTION, POWDER, FOR SUSPENSION INTRAMUSCULAR; INTRAVENOUS at 09:21

## 2022-01-01 RX ADMIN — Medication 20 MILLIGRAM(S): at 05:14

## 2022-01-01 RX ADMIN — ENOXAPARIN SODIUM 40 MILLIGRAM(S): 100 INJECTION SUBCUTANEOUS at 05:20

## 2022-01-01 RX ADMIN — AMLODIPINE BESYLATE 10 MILLIGRAM(S): 2.5 TABLET ORAL at 05:15

## 2022-01-01 RX ADMIN — CEFEPIME 100 MILLIGRAM(S): 1 INJECTION, POWDER, FOR SOLUTION INTRAMUSCULAR; INTRAVENOUS at 18:22

## 2022-01-01 RX ADMIN — Medication 5 MILLIGRAM(S): at 05:15

## 2022-01-01 RX ADMIN — Medication 650 MILLIGRAM(S): at 12:45

## 2022-01-01 RX ADMIN — HEPARIN SODIUM 5000 UNIT(S): 5000 INJECTION INTRAVENOUS; SUBCUTANEOUS at 21:24

## 2022-01-01 RX ADMIN — AMPICILLIN SODIUM AND SULBACTAM SODIUM 200 GRAM(S): 250; 125 INJECTION, POWDER, FOR SUSPENSION INTRAMUSCULAR; INTRAVENOUS at 20:14

## 2022-01-01 RX ADMIN — Medication 5 MILLIGRAM(S): at 05:14

## 2022-01-01 RX ADMIN — HEPARIN SODIUM 5000 UNIT(S): 5000 INJECTION INTRAVENOUS; SUBCUTANEOUS at 05:01

## 2022-01-01 RX ADMIN — Medication 40 MILLIGRAM(S): at 05:01

## 2022-01-01 RX ADMIN — AMPICILLIN SODIUM AND SULBACTAM SODIUM 200 GRAM(S): 250; 125 INJECTION, POWDER, FOR SUSPENSION INTRAMUSCULAR; INTRAVENOUS at 22:54

## 2022-01-01 RX ADMIN — Medication 100 MILLIGRAM(S): at 21:07

## 2022-01-01 RX ADMIN — AMPICILLIN SODIUM AND SULBACTAM SODIUM 200 GRAM(S): 250; 125 INJECTION, POWDER, FOR SUSPENSION INTRAMUSCULAR; INTRAVENOUS at 01:00

## 2022-01-01 RX ADMIN — Medication 3 MILLILITER(S): at 02:11

## 2022-01-01 RX ADMIN — TIOTROPIUM BROMIDE 1 CAPSULE(S): 18 CAPSULE ORAL; RESPIRATORY (INHALATION) at 08:42

## 2022-01-01 RX ADMIN — TIOTROPIUM BROMIDE 1 CAPSULE(S): 18 CAPSULE ORAL; RESPIRATORY (INHALATION) at 09:23

## 2022-01-01 RX ADMIN — Medication 650 MILLIGRAM(S): at 17:59

## 2022-01-01 RX ADMIN — Medication 1 MILLIGRAM(S): at 13:14

## 2022-01-01 RX ADMIN — HEPARIN SODIUM 5000 UNIT(S): 5000 INJECTION INTRAVENOUS; SUBCUTANEOUS at 05:15

## 2022-01-01 RX ADMIN — Medication 3 MILLILITER(S): at 18:46

## 2022-01-01 RX ADMIN — Medication 0.25 MILLIGRAM(S): at 14:26

## 2022-01-01 RX ADMIN — TIOTROPIUM BROMIDE 1 CAPSULE(S): 18 CAPSULE ORAL; RESPIRATORY (INHALATION) at 08:45

## 2022-01-01 RX ADMIN — Medication 3 MILLILITER(S): at 14:35

## 2022-01-01 RX ADMIN — TIOTROPIUM BROMIDE 1 CAPSULE(S): 18 CAPSULE ORAL; RESPIRATORY (INHALATION) at 08:41

## 2022-01-01 RX ADMIN — Medication 5 MILLIGRAM(S): at 05:02

## 2022-01-01 RX ADMIN — Medication 50 MILLILITER(S): at 02:54

## 2022-01-01 RX ADMIN — SODIUM CHLORIDE 500 MILLILITER(S): 9 INJECTION INTRAMUSCULAR; INTRAVENOUS; SUBCUTANEOUS at 11:57

## 2022-01-01 RX ADMIN — TIOTROPIUM BROMIDE 1 CAPSULE(S): 18 CAPSULE ORAL; RESPIRATORY (INHALATION) at 09:50

## 2022-01-01 RX ADMIN — Medication 20 MILLIGRAM(S): at 05:15

## 2022-01-01 RX ADMIN — SODIUM CHLORIDE 70 MILLILITER(S): 9 INJECTION INTRAMUSCULAR; INTRAVENOUS; SUBCUTANEOUS at 23:00

## 2022-01-01 RX ADMIN — HEPARIN SODIUM 5000 UNIT(S): 5000 INJECTION INTRAVENOUS; SUBCUTANEOUS at 06:22

## 2022-01-01 RX ADMIN — Medication 250 MILLIGRAM(S): at 18:20

## 2022-01-01 RX ADMIN — Medication 0.25 MILLIGRAM(S): at 19:44

## 2022-01-01 RX ADMIN — CEFEPIME 100 MILLIGRAM(S): 1 INJECTION, POWDER, FOR SOLUTION INTRAMUSCULAR; INTRAVENOUS at 15:07

## 2022-01-01 RX ADMIN — EPINEPHRINE 1 MILLIGRAM(S): 0.3 INJECTION INTRAMUSCULAR; SUBCUTANEOUS at 11:50

## 2022-01-01 RX ADMIN — AMPICILLIN SODIUM AND SULBACTAM SODIUM 200 GRAM(S): 250; 125 INJECTION, POWDER, FOR SUSPENSION INTRAMUSCULAR; INTRAVENOUS at 01:10

## 2022-01-01 RX ADMIN — SODIUM CHLORIDE 75 MILLILITER(S): 9 INJECTION, SOLUTION INTRAVENOUS at 11:51

## 2022-01-01 RX ADMIN — HEPARIN SODIUM 5000 UNIT(S): 5000 INJECTION INTRAVENOUS; SUBCUTANEOUS at 22:22

## 2022-01-01 RX ADMIN — TIOTROPIUM BROMIDE 1 CAPSULE(S): 18 CAPSULE ORAL; RESPIRATORY (INHALATION) at 08:38

## 2022-01-01 RX ADMIN — Medication 3 MILLILITER(S): at 16:03

## 2022-01-01 RX ADMIN — Medication 40 MILLIGRAM(S): at 17:26

## 2022-01-01 RX ADMIN — Medication 3 MILLILITER(S): at 15:13

## 2022-01-01 RX ADMIN — AMLODIPINE BESYLATE 10 MILLIGRAM(S): 2.5 TABLET ORAL at 05:14

## 2022-01-01 RX ADMIN — Medication 3 MILLILITER(S): at 08:34

## 2022-01-01 RX ADMIN — AMPICILLIN SODIUM AND SULBACTAM SODIUM 200 GRAM(S): 250; 125 INJECTION, POWDER, FOR SUSPENSION INTRAMUSCULAR; INTRAVENOUS at 02:55

## 2022-01-01 RX ADMIN — Medication 5 MILLIGRAM(S): at 21:01

## 2022-01-01 RX ADMIN — Medication 3 MILLILITER(S): at 08:44

## 2022-01-01 RX ADMIN — Medication 1 MILLIGRAM(S): at 11:10

## 2022-01-01 RX ADMIN — PANTOPRAZOLE SODIUM 40 MILLIGRAM(S): 20 TABLET, DELAYED RELEASE ORAL at 22:38

## 2022-01-01 RX ADMIN — HEPARIN SODIUM 5000 UNIT(S): 5000 INJECTION INTRAVENOUS; SUBCUTANEOUS at 14:26

## 2022-01-01 RX ADMIN — AMPICILLIN SODIUM AND SULBACTAM SODIUM 200 GRAM(S): 250; 125 INJECTION, POWDER, FOR SUSPENSION INTRAMUSCULAR; INTRAVENOUS at 06:01

## 2022-01-01 RX ADMIN — Medication 40 MILLIGRAM(S): at 17:15

## 2022-01-01 RX ADMIN — AMLODIPINE BESYLATE 10 MILLIGRAM(S): 2.5 TABLET ORAL at 05:10

## 2022-01-01 RX ADMIN — AMPICILLIN SODIUM AND SULBACTAM SODIUM 200 GRAM(S): 250; 125 INJECTION, POWDER, FOR SUSPENSION INTRAMUSCULAR; INTRAVENOUS at 08:10

## 2022-01-01 RX ADMIN — Medication 3 MILLILITER(S): at 20:04

## 2022-01-01 RX ADMIN — Medication 10 MILLIGRAM(S): at 11:10

## 2022-01-01 RX ADMIN — Medication 10 MILLIGRAM(S): at 12:08

## 2022-01-01 RX ADMIN — BUDESONIDE AND FORMOTEROL FUMARATE DIHYDRATE 2 PUFF(S): 160; 4.5 AEROSOL RESPIRATORY (INHALATION) at 07:51

## 2022-01-01 RX ADMIN — BUDESONIDE AND FORMOTEROL FUMARATE DIHYDRATE 2 PUFF(S): 160; 4.5 AEROSOL RESPIRATORY (INHALATION) at 10:02

## 2022-01-01 RX ADMIN — Medication 3 MILLILITER(S): at 11:12

## 2022-01-01 RX ADMIN — AMPICILLIN SODIUM AND SULBACTAM SODIUM 200 GRAM(S): 250; 125 INJECTION, POWDER, FOR SUSPENSION INTRAMUSCULAR; INTRAVENOUS at 05:14

## 2022-01-01 RX ADMIN — Medication 1 MILLIGRAM(S): at 11:03

## 2022-01-01 RX ADMIN — HEPARIN SODIUM 5000 UNIT(S): 5000 INJECTION INTRAVENOUS; SUBCUTANEOUS at 13:23

## 2022-01-01 RX ADMIN — AMPICILLIN SODIUM AND SULBACTAM SODIUM 200 GRAM(S): 250; 125 INJECTION, POWDER, FOR SUSPENSION INTRAMUSCULAR; INTRAVENOUS at 01:28

## 2022-01-01 RX ADMIN — Medication 20 MILLIGRAM(S): at 05:10

## 2022-01-01 RX ADMIN — AMPICILLIN SODIUM AND SULBACTAM SODIUM 200 GRAM(S): 250; 125 INJECTION, POWDER, FOR SUSPENSION INTRAMUSCULAR; INTRAVENOUS at 21:10

## 2022-01-01 RX ADMIN — Medication 5 MILLIGRAM(S): at 21:24

## 2022-01-01 RX ADMIN — Medication 3 MILLILITER(S): at 09:50

## 2022-01-01 RX ADMIN — Medication 3 MILLILITER(S): at 22:48

## 2022-01-01 RX ADMIN — AMPICILLIN SODIUM AND SULBACTAM SODIUM 200 GRAM(S): 250; 125 INJECTION, POWDER, FOR SUSPENSION INTRAMUSCULAR; INTRAVENOUS at 21:20

## 2022-01-01 RX ADMIN — ONDANSETRON 4 MILLIGRAM(S): 8 TABLET, FILM COATED ORAL at 17:32

## 2022-01-01 RX ADMIN — ONDANSETRON 4 MILLIGRAM(S): 8 TABLET, FILM COATED ORAL at 09:07

## 2022-01-01 RX ADMIN — Medication 5.1 MICROGRAM(S)/KG/MIN: at 11:05

## 2022-01-01 RX ADMIN — HEPARIN SODIUM 5000 UNIT(S): 5000 INJECTION INTRAVENOUS; SUBCUTANEOUS at 13:16

## 2022-01-01 RX ADMIN — Medication 5 MILLIGRAM(S): at 22:35

## 2022-01-01 RX ADMIN — Medication 1 MILLIGRAM(S): at 11:17

## 2022-01-01 RX ADMIN — Medication 10 MILLIGRAM(S): at 11:40

## 2022-01-01 RX ADMIN — MORPHINE SULFATE 6 MILLIGRAM(S): 50 CAPSULE, EXTENDED RELEASE ORAL at 12:58

## 2022-01-01 RX ADMIN — AMPICILLIN SODIUM AND SULBACTAM SODIUM 200 GRAM(S): 250; 125 INJECTION, POWDER, FOR SUSPENSION INTRAMUSCULAR; INTRAVENOUS at 02:34

## 2022-01-01 RX ADMIN — AMPICILLIN SODIUM AND SULBACTAM SODIUM 200 GRAM(S): 250; 125 INJECTION, POWDER, FOR SUSPENSION INTRAMUSCULAR; INTRAVENOUS at 13:14

## 2022-04-28 NOTE — ED ADULT NURSE NOTE - OBJECTIVE STATEMENT
76 year old female alert and oriented BIBA c/o n/v since last night. Denies abdominal pain. Per EMS family stated patient was confused, patient is awake and alert at this time and does not remember being confused. No s.s of distress noted.

## 2022-04-28 NOTE — ED PROVIDER NOTE - PROGRESS NOTE DETAILS
pt was desat on baseline NC she uses at home, bipap initiated, now satting better- consistently above 95%. suspected aspiration PNA given presentation, rectal temp, desats. confirmed on CT and abx per sepsis protocol had already been initiated. MOLST form requested by son- signed as DNR/DNI. ED Attending SMILEY Kong  Pt endorsed to me from Dr. Damon.  76-year-old female, MOST form filled out in the emergency department, DNR, DNI, COPD on home O2 approximately 3 L, anxiety on Xanax, brought in from home after being found in vomit nonbilious nonbloody.  Patient with temperature rectally of 100.3, white count of 17.60 neutrophil percent 86.4, noted to have a pH of 7.3, with a CO2 of 103, bicarb 51, placed on BiPAP, no respiratory distress at this time, improved on BiPAP, COVID-negative, RVP negative, CT head with no acute intracranial pathology, stable findings as noted, CT abdomen pelvis noted to have a consolidation in the right lung base consistent with aspiration pneumonia, given IV antibiotics, cefepime, vancomycin, patient received 2 L of fluid, family aware of plan for admission.  ICU team aware of pt , will come evaluate. ED Attending SMILEY Kong  Pt endorsed to me from Dr. Damon.  76-year-old female, MOST form filled out in the emergency department, DNR, DNI, COPD on home O2 approximately 3 L, anxiety on Xanax, brought in from home after being found in vomit nonbilious nonbloody.  Received zofran, no vomiting in ed. Patient with temperature rectally of 100.3, white count of 17.60 neutrophil percent 86.4, noted to have a pH of 7.3, with a CO2 of 103, bicarb 51, placed on BiPAP, no respiratory distress at this time, improved on BiPAP, COVID-negative, RVP negative, CT head with no acute intracranial pathology, stable findings as noted, CT abdomen pelvis noted to have a consolidation in the right lung base consistent with aspiration pneumonia, given IV antibiotics, cefepime, vancomycin, patient received 2 L of fluid, family aware of plan for admission.  ICU team aware of pt , will come evaluate. ED Attending SMILEY Kong  ICU team reports admission to SDU. ED Attending SMILEY Kong  ICU team reports admission to SDU.  feliciano was placed twice by nursing team and reported urine around feliciano catheter, removed, primafit on suction placed pt pulled it off, attempt to place again by nursing staff for urine, imaging noted for aspiration PNA, abx given for this, medical admitting team made aware of pt  and admission as discussed with family.

## 2022-04-28 NOTE — ED PROVIDER NOTE - NS ED ROS FT
Constitutional: no fever, chills, no recent weight loss, change in appetite or malaise  Eyes: no redness/discharge/pain/vision changes  ENT: no rhinorrhea/ear pain/sore throat  Cardiac: No chest pain, edema.  Respiratory: No cough or respiratory distress  GI: No diarrhea or abdominal pain.  : No dysuria, frequency, urgency or hematuria  MS: no pain to back or extremities, no loss of ROM, no weakness  Neuro: No headache or weakness. No LOC.  Skin: No skin rash.  Endocrine: No history of thyroid disease or diabetes.  Except as documented in the HPI, all other systems are negative.

## 2022-04-28 NOTE — ED ADULT NURSE NOTE - CHIEF COMPLAINT QUOTE
Pt sent in by family after finding her in a pool of vomit and "altered " . Upon EMS arrival pt was A&Ox3 and FS was in the 56 in field. Pt did not remember vomiting. No abdominal pain noted

## 2022-04-28 NOTE — ED ADULT TRIAGE NOTE - CHIEF COMPLAINT QUOTE
Pt sent in by family after finding her in a pool of vomit and "altered " . Upon EMS arrival pt was A&Ox3 and FS was in the 56 in field. Pt did not remember vomiting. No abdominal pain noted Pt sent in by family after finding her in a pool of vomit and "altered " . Upon EMS arrival pt was A&Ox3 and FS was in the 156 in field. Pt did not remember vomiting. No abdominal pain noted

## 2022-04-28 NOTE — ED PROVIDER NOTE - ATTENDING CONTRIBUTION TO CARE
77 yo f with pmh of copd on 3L O2, anxiety on xanax, htn, BIBA after being found in bed in a pool of vomit.  as per son, pt has HHA that comes at 1pm.  pt found still in bed with vomit.  pt says she doesn't remember vomiting.  pt denies abd pain, headache, cp.  pt says sob is at baseline.  denies fever or chills.  HHA was sick recently.  exam: cachectic, ncat, perrl, eomi, dry mm, rrr, diffuse ronchi, abd soft, nt, nd aox3, no calf tenderness, no pitting edema, skin tenting imp: pt with vomiting, possibly in her sleep, will check labs, ct head/abd, rvp, ivf.  found hypoxic despite O2, will place on bipap    I personally evaluated the patient. I reviewed the  Physician Assistant’s note (as assigned above), and agree with the findings and plan except as documented in my note.

## 2022-04-28 NOTE — ED PROVIDER NOTE - NS ED ATTENDING STATEMENT MOD
This was a shared visit with the LEAH. I reviewed and verified the documentation and independently performed the documented: I have personally provided the amount of critical care time documented below concurrently with the resident/fellow.  This time excludes time spent on separate procedures and time spent teaching. I have reviewed the resident’s / fellow’s documentation and I agree with the history, exam, and assessment and plan of care.

## 2022-04-28 NOTE — ED PROVIDER NOTE - ATTENDING APP SHARED VISIT CONTRIBUTION OF CARE
77 yo f with pmh of copd on 3L O2, anxiety on xanax, htn, BIBA after being found in bed in a pool of vomit.  as per son, pt has HHA that comes at 1pm.  pt found still in bed with vomit.  pt says she doesn't remember vomiting.  pt denies abd pain, headache, cp.  pt says sob is at baseline.  denies fever or chills.  HHA was sick recently.  exam: cachectic, ncat, perrl, eomi, dry mm, rrr, diffuse ronchi, abd soft, nt, nd aox3, no calf tenderness, no pitting edema, skin tenting imp: pt with vomiting, possibly in her sleep, will check labs, ct head/abd, rvp, ivf.  found hypoxic despite O2, will place on bipap

## 2022-04-28 NOTE — ED PROVIDER NOTE - OBJECTIVE STATEMENT
pt with pmhx HTN and COPD on home O2 presents to ED c/o n/v since yesterday. lives alone, but downstairs from sister. son HCP. per EMS was found in vomit and altered, but A&Ox3 in ED on arrival. limited hx as son was not present at the house, arrived to ED to meet pt here afterwards. vomited once in ED- bilious  Denies fever/chill/HA/dizziness/chest pain/palpitation/sob/black stool/bloody stool/urinary sxs

## 2022-04-28 NOTE — ED PROVIDER NOTE - CARE PLAN
Principal Discharge DX:	Pneumonia, aspiration   1 Principal Discharge DX:	Pneumonia, aspiration  Secondary Diagnosis:	Hypercapnia  Secondary Diagnosis:	BiPAP (biphasic positive airway pressure) dependence

## 2022-04-29 NOTE — H&P ADULT - NSHPLABSRESULTS_GEN_ALL_CORE
VITAL SIGNS: Last 24 Hours  T(C): 35.8 (29 Apr 2022 00:21), Max: 37.9 (28 Apr 2022 17:42)  T(F): 96.4 (29 Apr 2022 00:21), Max: 100.3 (28 Apr 2022 17:42)  HR: 86 (29 Apr 2022 00:21) (86 - 92)  BP: 158/98 (29 Apr 2022 00:21) (151/86 - 158/98)  BP(mean): --  RR: 18 (29 Apr 2022 00:21) (18 - 19)  SpO2: 97% (29 Apr 2022 00:21) (94% - 99%)    LABS:                        11.9   17.60 )-----------( 167      ( 28 Apr 2022 15:49 )             39.1     04-28    142  |  90<L>  |  14  ----------------------------<  126<H>  4.7   |  43<HH>  |  0.7    Ca    10.2<H>      28 Apr 2022 15:49  Mg     1.8     04-28    TPro  7.1  /  Alb  4.5  /  TBili  0.6  /  DBili  x   /  AST  19  /  ALT  6   /  AlkPhos  62  04-28          Troponin T, Serum: <0.01 ng/mL (04-28-22 @ 15:49)      CARDIAC MARKERS ( 28 Apr 2022 15:49 )  x     / <0.01 ng/mL / x     / x     / x

## 2022-04-29 NOTE — CONSULT NOTE ADULT - SUBJECTIVE AND OBJECTIVE BOX
Patient is a 76y old  Female who presents with a chief complaint of vomiting (2022 02:08)    77 yo F w/ pmhx HTN and COPD on home O2 presents to ED c/o n/v since yesterday. lives alone, but downstairs from sister. son HCP. per EMS was found in vomit and altered, but A&Ox3 in ED on arrival. limited hx as son was not present at the house, arrived to ED to meet pt here afterwards. vomited once in ED- bilious. Denies fever/chill/HA/dizziness/chest pain/palpitation/sob/black stool/bloody stool/urinary sxs. On admission: pt was afebrile, HD stable, on bipap saturating at 100%, WBC 17.60, VB.30/103/33/51. xray unremarkable. Ct abd: Consolidation is noted at the right lung base. Head ct done, placed on BIPAP called to evaluate, this am on bipap 40%      PAST MEDICAL & SURGICAL HISTORY:  COPD (chronic obstructive pulmonary disease)    HTN (hypertension)    Anxiety    History of cholecystectomy    History of hysterectomy        SOCIAL HX:   Smoking     ex    FAMILY HISTORY:  Family history of COPD (chronic obstructive pulmonary disease)  In father    Family history of congestive heart failure        REVIEW OF SYSTEMS see hpi    Allergies    Levaquin (Other)    Intolerances        ALBUTerol    90 MICROgram(s) HFA Inhaler 2 Puff(s) Inhalation every 4 hours  albuterol/ipratropium for Nebulization 3 milliLiter(s) Nebulizer every 6 hours  ALPRAZolam 0.25 milliGRAM(s) Oral at bedtime  amLODIPine   Tablet 10 milliGRAM(s) Oral daily  ampicillin/sulbactam  IVPB      ampicillin/sulbactam  IVPB 3 Gram(s) IV Intermittent every 6 hours  budesonide 160 MICROgram(s)/formoterol 4.5 MICROgram(s) Inhaler 2 Puff(s) Inhalation two times a day  enalapril 5 milliGRAM(s) Oral daily  folic acid 1 milliGRAM(s) Oral daily  heparin   Injectable 5000 Unit(s) SubCutaneous every 8 hours  melatonin 5 milliGRAM(s) Oral at bedtime  pantoprazole  Injectable 40 milliGRAM(s) IV Push at bedtime  PARoxetine 10 milliGRAM(s) Oral daily  tiotropium 18 MICROgram(s) Capsule 1 Capsule(s) Inhalation daily  : Home Meds:      PHYSICAL EXAM    ICU Vital Signs Last 24 Hrs  T(C): 36.3 (2022 05:55), Max: 37.9 (2022 17:42)  T(F): 97.4 (2022 05:55), Max: 100.3 (2022 17:42)  HR: 78 (2022 05:55) (78 - 92)  BP: 146/71 (2022 05:55) (146/71 - 158/98)  RR: 18 (2022 05:55) (18 - 19)  SpO2: 97% (2022 05:55) (94% - 99%)      General: ill looking  HEENT:  BLANCHE              Lymph Nodes: No cervical LN   Lungs: dec bs both bases  Cardiovascular: AGA 2.6  Abdomen: Soft, Positive BS  Extremities: No clubbing  Skin: Warm  Neurological: Non focal         LABS:                          11.9   17.60 )-----------( 167      ( 2022 15:49 )             39.1                                                   142  |  90<L>  |  14  ----------------------------<  126<H>  4.7   |  43<HH>  |  0.7    Ca    10.2<H>      2022 15:49  Mg     1.8         TPro  7.1  /  Alb  4.5  /  TBili  0.6  /  DBili  x   /  AST  19  /  ALT  6   /  AlkPhos  62                                                   CARDIAC MARKERS ( 2022 15:49 )  x     / <0.01 ng/mL / x     / x     / x                                                LIVER FUNCTIONS - ( 2022 15:49 )  Alb: 4.5 g/dL / Pro: 7.1 g/dL / ALK PHOS: 62 U/L / ALT: 6 U/L / AST: 19 U/L / GGT: x                                                                                                                                     MEDICATIONS  (STANDING):  ALBUTerol    90 MICROgram(s) HFA Inhaler 2 Puff(s) Inhalation every 4 hours  albuterol/ipratropium for Nebulization 3 milliLiter(s) Nebulizer every 6 hours  ALPRAZolam 0.25 milliGRAM(s) Oral at bedtime  amLODIPine   Tablet 10 milliGRAM(s) Oral daily  ampicillin/sulbactam  IVPB      ampicillin/sulbactam  IVPB 3 Gram(s) IV Intermittent every 6 hours  budesonide 160 MICROgram(s)/formoterol 4.5 MICROgram(s) Inhaler 2 Puff(s) Inhalation two times a day  enalapril 5 milliGRAM(s) Oral daily  folic acid 1 milliGRAM(s) Oral daily  heparin   Injectable 5000 Unit(s) SubCutaneous every 8 hours  melatonin 5 milliGRAM(s) Oral at bedtime  pantoprazole  Injectable 40 milliGRAM(s) IV Push at bedtime  PARoxetine 10 milliGRAM(s) Oral daily  tiotropium 18 MICROgram(s) Capsule 1 Capsule(s) Inhalation daily      cxr/ ct ap reviewed

## 2022-04-29 NOTE — H&P ADULT - NSHPSOCIALHISTORY_GEN_ALL_CORE
Substance Use (street drugs): ( x ) never used  (  ) other:  Tobacco Usage:  ( x  ) never smoked   (   ) former smoker   (   ) current smoker  (     ) pack year  Alcohol Usage: None Substance Use (street drugs): ( x ) never used  (  ) other:  Tobacco Usage:  (   ) never smoked   ( x  ) former smoker   (   ) current smoker  (     ) pack year  Alcohol Usage: None

## 2022-04-29 NOTE — CONSULT NOTE ADULT - ASSESSMENT
IMPRESSION:    Acute on chronic hypercapnic resp failure  COPD exacerbation  RLL pneumonia/ highly aspiration  altered MS toxic metabolic  Vomiting/ CT AP reviewed      PLAN:    CNS: Avoid CNS depressant    HEENT:  Oral care    PULMONARY:  HOB @ 45 degrees, aspiration precaution, BIPAP at night, repeat ABG, HHFNC, Solumedrol 40 q 12    CARDIOVASCULAR: avoid overload, CE    GI: GI prophylaxis                                          Feeding when off bipap, clear    RENAL:  F/u  lytes.  Correct as needed. accurate I/O    INFECTIOUS DISEASE: abx, procal    HEMATOLOGICAL:  DVT prophylaxis. LE doppler    ENDOCRINE:  Follow up FS.  Insulin protocol if needed.      SDU  Poor prognosis

## 2022-04-29 NOTE — H&P ADULT - HISTORY OF PRESENT ILLNESS
77 yo F w/ pmhx HTN and COPD on home O2 presents to ED c/o n/v since yesterday. lives alone, but downstairs from sister. son HCP. per EMS was found in vomit and altered, but A&Ox3 in ED on arrival. limited hx as son was not present at the house, arrived to ED to meet pt here afterwards. vomited once in ED- bilious. Denies fever/chill/HA/dizziness/chest pain/palpitation/sob/black stool/bloody stool/urinary sxs. On admission: pt was afebrile, HD stable, on bipap saturating at 100%, WBC 17.60, VB.30/103/33/51. xray unremarkable. Ct abd: Consolidation is noted at the right lung base.

## 2022-04-29 NOTE — H&P ADULT - ATTENDING COMMENTS
76 yr old female with hx of HTN and COPD (on home O2) was admitted to medicine for confusion and vomiting.     # Toxic Metabolic Encephalitis secondary to Aspiration PNA vs Hypercapnia   # Acute on chronic Hypoxic and Hypercapnic Respiratory Failure   # COPD Exacerbation   # HTN  - on bipap --> wean off   - keep O2 sat: 88-92%  - Ct abd: Consolidation is noted at the right lung base.   - NPO for now  - S&S eval   - c/w  Unasyn  - c/w Duoneb, Symbicort, Spiriva  - aspiration precaution     **Pt seen on 04/29 GENERAL: NAD, well-developed, Non-toxic, stated age   HEAD:  Atraumatic, Normocephalic  EYES: EOMI, Sclera White   NECK: Supple, No JVD  CHEST/LUNG: clear b/l breath sounds; No wheezing, rhonchi, or crackles  HEART: Regular rate and rhythm; s1, s2, No murmurs, rubs, or gallops  ABDOMEN: Soft, Nontender, Nondistended; Bowel sounds present, No rebound or guarding noted   EXTREMITIES:  No lower extremity edema or calf tenderness to palpation.  No clubbing or cyanosis  PSYCH: AAOx3, pleasant, cooperative, not anxious  NEUROLOGY: CN intact, 5/5 strength in all extremities, Sensation grossly intact.   SKIN: No rashes or lesions    Pt is a poor historian.   76 yr old female with hx of HTN and COPD (on home O2) was admitted to medicine for confusion and vomiting.     # Toxic Metabolic Encephalopathy secondary to Aspiration PNA vs Hypercapnia   # Acute on chronic Hypoxic and Hypercapnic Respiratory Failure   # COPD Exacerbation   # HTN  - AAOx2 at encounter  - on bipap --> wean off   - keep O2 sat: 88-92%  - Ct abd: Consolidation is noted at the right lung base.   - NPO for now  - S&S eval   - c/w  Unasyn  - c/w Duoneb, Symbicort, Spiriva  - aspiration precaution   - Pul following     **Pt seen on 04/29 GENERAL: NAD, well-developed, Non-toxic, stated age   HEAD:  Atraumatic, Normocephalic  EYES: EOMI, Sclera White   NECK: Supple, No JVD  CHEST/LUNG: clear b/l breath sounds; No wheezing, rhonchi, or crackles  HEART: Regular rate and rhythm; s1, s2, No murmurs, rubs, or gallops  ABDOMEN: Soft, Nontender, Nondistended; Bowel sounds present, No rebound or guarding noted   EXTREMITIES:  No lower extremity edema or calf tenderness to palpation.  No clubbing or cyanosis  PSYCH: AAOx2  NEUROLOGY: unable to assess  SKIN: No rashes or lesions    Pt is a poor historian.   76 yr old female with hx of HTN and COPD (on home O2) was admitted to medicine for confusion and vomiting.     # Toxic Metabolic Encephalopathy secondary to Aspiration PNA vs Hypercapnia   # Acute on chronic Hypoxic and Hypercapnic Respiratory Failure   # COPD Exacerbation   # HTN  - AAOx2 at encounter  - on bipap --> wean off   - keep O2 sat: 88-92%  - Ct abd: Consolidation is noted at the right lung base.   - NPO for now  - S&S eval   - c/w  Unasyn  - c/w Duoneb, Symbicort, Spiriva  - aspiration precaution   - Pul following     **Pt seen on 04/29

## 2022-04-29 NOTE — H&P ADULT - ASSESSMENT
75 yo F w/ pmhx HTN and COPD on home O2 presents to ED c/o n/v since yesterday. lives alone, but downstairs from sister. son HCP. per EMS was found in vomit and altered, but A&Ox3 in ED on arrival. limited hx as son was not present at the house, arrived to ED to meet pt here afterwards. vomited once in ED- bilious. Denies fever/chill/HA/dizziness/chest pain/palpitation/sob/black stool/bloody stool/urinary sxs. On admission: pt was afebrile, HD stable, on bipap saturating at 100%, WBC 17.60, VB.30/103/33/51. xray unremarkable. Ct abd: Consolidation is noted at the right lung base.       #AHRF likely 2/2 aspiration PNA  -  On admission: pt was afebrile, HD stable, on bipap saturating at 100%,  - WBC 17.60, VB.30/103/33/51  - xray unremarkable  - Ct abd: Consolidation is noted at the right lung base.   - started on unasyn  - aspiration precaution   - npo while on bipap, continue bipap overnight, wean tomorrow  - repeat ABG   - Monitor pulse, keep 88-92%  - c/w duonebs PRN and standing, Symbicort, spiriva      #HTN  - c/w home meds    #depression  - c/w paroxetine     #anxiety  - c/w home dose benzo    #Misc  - DVT Prophylaxis: heparin  - Diet: NPO for now  - GI Prophylaxis: PPI  - Activity: AAT  - IV Fluids: n/a  - Code Status: full code

## 2022-04-29 NOTE — PATIENT PROFILE ADULT - FALL HARM RISK - HARM RISK INTERVENTIONS

## 2022-04-29 NOTE — PATIENT PROFILE ADULT - STATED REASON FOR ADMISSION
- possibly 2/2 GIB: GI (Dr. Daly) EGD showed duodenal ulcer  Colonoscopy scheduled    NPO after midnight  - Patient is  s/p transfusion of total 5 units PRBCs and 1 unit platelets.  - monitor H/H, transfuse as necessary.  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 rehab prefers another facility - possibly 2/2 GIB: GI (Dr. Daly) EGD showed duodenal ulcer  Colonoscopy scheduled for Friday   Per GI - 2 day prep, on clear fluids carb counting for DM  - Patient is  s/p transfusion of total 5 units PRBCs and 1 unit platelets.  - monitor H/H, transfuse as necessary.  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 Daly) EGD showed gastritis and esophagitis  Colonoscopy scheduled for Friday   Per GI - 2 day prep, on clear fluids carb counting for DM  - Patient is  s/p transfusion of total 5 units PRBCs and 1 unit platelets.  - monitor H/H, transfuse as necessary.  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

## 2022-04-30 NOTE — DIETITIAN INITIAL EVALUATION ADULT - ORAL INTAKE PTA/DIET HISTORY
Hx obtained from pt at bedside. Reports PO intake unchanged PTA. Has help with preparing three meals daily for her at home. Eats a well balanced diet with no diet restrictions. Does not weigh herself, so she is unaware of her weight changes. Reports ht 5'4".

## 2022-04-30 NOTE — DIETITIAN INITIAL EVALUATION ADULT - ENERGY INTAKE
Poor (<50%) Observed 25% tray intake with breakfast tray this AM. Endorses that is her normal intake at each meal.

## 2022-04-30 NOTE — DIETITIAN INITIAL EVALUATION ADULT - NSFNSGIIOFT_GEN_A_CORE
-  I&O's Detail    29 Apr 2022 07:01  -  30 Apr 2022 07:00  --------------------------------------------------------  IN:  Total IN: 0 mL    OUT:    Incontinent per Collection Bag (mL): 1000 mL  Total OUT: 1000 mL    Total NET: -1000 mL

## 2022-04-30 NOTE — DIETITIAN INITIAL EVALUATION ADULT - PERTINENT LABORATORY DATA
04-30    140  |  92<L>  |  26<H>  ----------------------------<  110<H>  4.3   |  42<HH>  |  0.8    Ca    9.5      30 Apr 2022 04:30  Mg     2.0     04-30    TPro  5.9<L>  /  Alb  4.1  /  TBili  0.5  /  DBili  x   /  AST  12  /  ALT  5   /  AlkPhos  56  04-29  -

## 2022-04-30 NOTE — DIETITIAN INITIAL EVALUATION ADULT - PERTINENT MEDS FT
ampicillin/sulbactam  IVPB      folic acid 1 milliGRAM(s) Oral daily  melatonin 5 milliGRAM(s) Oral at bedtime  methylPREDNISolone sodium succinate Injectable 40 milliGRAM(s) IV Push every 12 hours  pantoprazole  Injectable 40 milliGRAM(s) IV Push at bedtime    MEDICATIONS  (PRN):  ondansetron Injectable 4 milliGRAM(s) IV Push every 6 hours PRN Nausea and/or Vomiting  -

## 2022-05-01 NOTE — CHART NOTE - NSCHARTNOTEFT_GEN_A_CORE
Pt seen and examined. Pt evaluated by overnight nocturnist. Currently being treated for aspiration pneumonia and possible COPD exacerbation. Continue IV unasyn and IV solumedrol 40 q12h. Pt weaned off BIPAP today morning to NC, saturating 99% on 5L. Wean down oxygen as tolerated. Patient has home O2.
Spoke with niece - Judie [phone]  Feng  [phone]    [facility] discussed - DNR/DNI    Patient is in a wheelchair at home does ambulate independently to the bathroom as per family  lives alone now  Son, Josh passed away recently
Transfer from: CEU  Transfer to:  medical floors    75 yo F w/ pmhx HTN and COPD on home O2 presents to ED c/o n/v since yesterday. lives alone, but downstairs from sister. son HCP. per EMS was found in vomit and altered, but A&Ox3 in ED on arrival. limited hx as son was not present at the house, arrived to ED to meet pt here afterwards. vomited once in ED- bilious. Denies fever/chill/HA/dizziness/chest pain/palpitation/sob/black stool/bloody stool/urinary sxs. On admission: pt was afebrile, HD stable, on bipap saturating at 100%, WBC 17.60, VB.30/103/33/51. xray unremarkable. Ct abd: Consolidation is noted at the right lung base.     CEU COURSE:  Pt admitted for toxic metabolic encephalopathy/acute hypoxic respiratory failure secondary to likely aspiration PNA. She was started on BiPAP, IV steroids and started on unasyn. Oxygen weaned to now 4LNC alternating with BiPAP q4hrs and at bedtime. Medically stable to be downgraded to the floors.     To-Do:  [x] pulm f/u  [x] PT consult      ASSESSMENT & PLAN:  75 yo F w/ pmhx HTN and COPD on home O2 presents to ED c/o n/v since yesterday. lives alone, but downstairs from sister. son HCP. per EMS was found in vomit and altered, but A&Ox3 in ED on arrival. limited hx as son was not present at the house, arrived to ED to meet pt here afterwards. vomited once in ED- bilious. Denies fever/chill/HA/dizziness/chest pain/palpitation/sob/black stool/bloody stool/urinary sxs. On admission: pt was afebrile, HD stable, on bipap saturating at 100%, WBC 17.60, VB.30/103/33/51. xray unremarkable. Ct abd: Consolidation is noted at the right lung base.       #AHRF likely 2/2 aspiration PNA  -  On admission: pt was afebrile, HD stable, on bipap saturating at 100%,  - WBC 17.60, VB.30/103/33/51  - xray unremarkable  - Ct abd: Consolidation is noted at the right lung base.   - c/w unasyn  - BiPAP q4 and qhs  - Monitor pulse, keep 88-92%  - c/w duonebs PRN and standing, Symbicort, spiriva, BiPAP PRN  - c/w solumedrol 40mg q12    #HTN  - c/w home meds    #depression  - c/w paroxetine     #anxiety  - c/w home dose benzo    # suspected folic acid deficiency  -on supplement    #Misc  - DVT Prophylaxis: heparin  - Diet: minced, moist  - GI Prophylaxis: PPI  - Activity: AAT  - Code Status: DNR

## 2022-05-01 NOTE — PHYSICAL THERAPY INITIAL EVALUATION ADULT - GENERAL OBSERVATIONS, REHAB EVAL
pt seen in CEU. 4263-7894 pm. 75 y/o F rec'd/left in bed, nad, + tele, 4L O2. A+Ox3, pleasant and motivated. ambulated to/from bathroom 10 ft x 2 with RW, min/mod A. at home at baseline ambulated independently. 125/77 81 100% on 4L O2.

## 2022-05-02 NOTE — DISCHARGE NOTE PROVIDER - DETAILS OF MALNUTRITION DIAGNOSIS/DIAGNOSES
I did the [id number]-akin and the authorization number is [id number]. This patient has been assessed with a concern for Malnutrition and was treated during this hospitalization for the following Nutrition diagnosis/diagnoses:     -  04/30/2022: Moderate protein-calorie malnutrition   -  04/30/2022: Underweight (BMI < 19)

## 2022-05-02 NOTE — DISCHARGE NOTE PROVIDER - NSDCCPCAREPLAN_GEN_ALL_CORE_FT
PRINCIPAL DISCHARGE DIAGNOSIS  Diagnosis: Pneumonia, aspiration  Assessment and Plan of Treatment: You were found on imaging to have aspiration pneumonia, an infection of the lung from aspiration.   You were given IV antibiotics during hospital stay. We are sending you home on oral antibiotics to finish the course. PLease take as instructed.         SECONDARY DISCHARGE DIAGNOSES  Diagnosis: COPD exacerbation  Assessment and Plan of Treatment: You were found to have COPD exacerbation and given IV steorids. You were changed to oral steroids. PLease continue to take oral prednisone as instructed. Continue to take home inhalers as previously instructed. Continue all medications as instructed and follow up with pulmonologist.

## 2022-05-02 NOTE — DISCHARGE NOTE PROVIDER - ATTENDING DISCHARGE PHYSICAL EXAMINATION:
Patient seen and examined independently. I personally had a face-to-face encounter with the patient, examined the patient myself, personally reviewed labs & Radiology images,  and reviewed the plan of care with the housestaff. Agree with resident's note but my note supersedes that of the resident in the matters hereby listed.

## 2022-05-02 NOTE — DISCHARGE NOTE NURSING/CASE MANAGEMENT/SOCIAL WORK - PATIENT PORTAL LINK FT
You can access the FollowMyHealth Patient Portal offered by [facility] by registering at the following website: [email or web address]. By joining [employer]’s FollowMyHealth portal, you will also be able to view your health information using other applications (apps) compatible with our system.

## 2022-05-02 NOTE — DISCHARGE NOTE PROVIDER - NSDCMRMEDTOKEN_GEN_ALL_CORE_FT
albuterol 2.5 mg/3 mL (0.083%) inhalation solution: 3 milliliter(s) inhaled every 6 hours  ALPRAZolam 0.25 mg oral tablet: 1 tab(s) orally 2 times a day, As needed, anxiety  amLODIPine 10 mg oral tablet: 1 tab(s) orally once a day  amoxicillin-clavulanate 875 mg-125 mg oral tablet: 1 tab(s) orally 2 times a day  budesonide-formoterol 160 mcg-4.5 mcg/inh inhalation aerosol: 2 puff(s) inhaled every 12 hours   enalapril 5 mg oral tablet: 1 tab(s) orally once a day  folic acid 1 mg oral tablet: 1 tab(s) orally once a day  Incruse Ellipta 62.5 mcg/inh inhalation powder: 1 puff(s) inhaled once a day  pantoprazole 40 mg oral delayed release tablet: 1 tab(s) orally once a day (before a meal)  Paxil 10 mg oral tablet: 1 tab(s) orally once a day  predniSONE 20 mg oral tablet: 1 tab(s) orally once a day  ProAir HFA 90 mcg/inh inhalation aerosol: 2 puff(s) inhaled 4 times a day   albuterol 2.5 mg/3 mL (0.083%) inhalation solution: 3 milliliter(s) inhaled every 6 hours  ALPRAZolam 0.25 mg oral tablet: 1 tab(s) orally 2 times a day, As needed, anxiety  amLODIPine 10 mg oral tablet: 1 tab(s) orally once a day  amoxicillin-clavulanate 875 mg-125 mg oral tablet: 1 tab(s) orally every 12 hours   End date: 5/5/2022  budesonide-formoterol 160 mcg-4.5 mcg/inh inhalation aerosol: 2 puff(s) inhaled every 12 hours   enalapril 5 mg oral tablet: 1 tab(s) orally once a day  folic acid 1 mg oral tablet: 1 tab(s) orally once a day  Incruse Ellipta 62.5 mcg/inh inhalation powder: 1 puff(s) inhaled once a day  pantoprazole 40 mg oral delayed release tablet: 1 tab(s) orally once a day (before a meal)  Paxil 10 mg oral tablet: 1 tab(s) orally once a day  predniSONE 20 mg oral tablet: 1 tab(s) orally once a day  End date: 5/6/2022  ProAir HFA 90 mcg/inh inhalation aerosol: 2 puff(s) inhaled 4 times a day

## 2022-05-02 NOTE — DISCHARGE NOTE NURSING/CASE MANAGEMENT/SOCIAL WORK - NSDCPEFALRISK_GEN_ALL_CORE
For information on Fall & Injury Prevention, visit: [email or web address] OR  [email or web address] OR  https://www.cdc.gov/steadi/patient.html

## 2022-05-02 NOTE — DISCHARGE NOTE PROVIDER - HOSPITAL COURSE
77 yo F w/ pmhx HTN and COPD on home O2 presents to ED c/o n/v since yesterday. lives alone, but downstairs from sister. son HCP. per EMS was found in vomit and altered, but A&Ox3 in ED on arrival. limited hx as son was not present at the house, arrived to ED to meet pt here afterwards. vomited once in ED- bilious. Denies fever/chill/HA/dizziness/chest pain/palpitation/sob/black stool/bloody stool/urinary sxs. On admission: pt was afebrile, HD stable, on bipap saturating at 100%, WBC 17.60, VB.30/103/33/51. xray unremarkable. Ct abd: Consolidation is noted at the right lung base.     CEU COURSE:  Pt admitted for toxic metabolic encephalopathy/acute hypoxic respiratory failure secondary to likely aspiration PNA. She was started on BiPAP, IV steroids and started on unasyn. Oxygen weaned to now 4LNC alternating with BiPAP q4hrs and at bedtime. Medically stable to be downgraded to the floors.     On medical floors, mental status improved. Oxygen was weaned to home O2 requirements. Transitioned from solumedrol to oral prednisone 20mg for 5 day course (start , end ). Plan to continue abx for total 7 days (started , end date ). Unasyn changed to Augmentin on discharge,     Patient is medically stable and ready for discharge. 75 yo F w/ pmhx HTN and COPD on home O2 presents to ED c/o n/v since yesterday. lives alone, but downstairs from sister. son HCP. per EMS was found in vomit and altered, but A&Ox3 in ED on arrival. limited hx as son was not present at the house, arrived to ED to meet pt here afterwards. vomited once in ED- bilious. Denies fever/chill/HA/dizziness/chest pain/palpitation/sob/black stool/bloody stool/urinary sxs. On admission: pt was afebrile, HD stable, on bipap saturating at 100%, WBC 17.60, VB.30/103/33/51. xray unremarkable. Ct abd: Consolidation is noted at the right lung base.     CEU COURSE:  Pt admitted for toxic metabolic encephalopathy/acute hypoxic respiratory failure secondary to likely aspiration PNA. She was started on BiPAP, IV steroids and started on unasyn. Oxygen weaned to now 4LNC alternating with BiPAP q4hrs and at bedtime. Medically stable to be downgraded to the floors.     On medical floors, mental status improved. Oxygen was weaned to home O2 requirements. Transitioned from solumedrol to oral prednisone 20mg for 5 day course (start , end ). Plan to continue abx for total 7 days (started , end date ). Unasyn changed to Augmentin on discharge,     Patient is medically stable and ready for discharge.     Attending Note:  Patient seen and examined independently. I personally had a face-to-face encounter with the patient, examined the patient myself, personally reviewed labs & Radiology images,  and reviewed the plan of care with the housestaff. Agree with resident's note but my note supersedes that of the resident in the matters hereby listed.   D./c to home  Bipap at home  Meds per rec  f/u Pulm, PCP

## 2022-05-02 NOTE — DISCHARGE NOTE PROVIDER - PROVIDER TOKENS
PROVIDER:[TOKEN:[[id number]],FOLLOWUP:[1 week]],PROVIDER:[TOKEN:[[id number]],FOLLOWUP:[1 week]]

## 2022-05-02 NOTE — DISCHARGE NOTE PROVIDER - CARE PROVIDER_API CALL
Tanner Rodriguez)  Critical Care Medicine; Internal Medicine; Pulmonary Disease; Sleep Medicine  [address] [place] [address]  Phone: [phone]  Fax: [phone]  Follow Up Time: 1 week    Demarcus Franks)  Internal Medicine  [address] [place] [address]  Phone: [phone]  Fax: [phone]  Follow Up Time: 1 week

## 2022-05-03 NOTE — PROGRESS NOTE ADULT - ATTENDING COMMENTS
Patient is a 75 y/o  female with hx of HTN and COPD (on home O2) was admitted for eval of vomiting , dx. with acute metabolic encephalopathy, aspiration right lung pneumonia, initially admitted to CEU, post medical stabilization downgraded to medical unit with the following course of therapy:     # Acute Toxic Metabolic Encephalopathy - resolved, patient's mentation is at baseline  -  secondary to hypercapnia , resolved post initiation of bipap tx.    # Acute on chronic Hypoxic and Hypercapnic Respiratory Failure secondary to COPD exacerbation - clinically improved with medical tx.   # COPD on home 02( 2.5 L via at home )  - currently on 2.5 L sat above 90%  - bipap q4hrs prn and qhs,  continue nebs, symbicort, singulair   - Pulmonart team on board  -For right lung aspiration pneumonia- patient completing 7 days course of IV Unasyn ( started on 4/28 with 1 dose of IV cefepime/vanco) day 5/7, can switch to PO augmentin on d/c home   - patient was on  IV solumedrol -now on PO Prednisone 20 mg once daily x 5 days    -Patient will benefit from home bipap tx. arrangement, will need close outpatient Pulm. f/up  for further outpatient care of COPD.        # HTN  -well controlled on current regimen    # Anxiety  - on alprazolam and paroxetine    # Gerd   - on protonix    # suspected folic acid deficiency  -on supplement    # Physical deconditioning- post PT eval today, ambulated 10 feet with walker, 1 person assistance.     Code status: DNR/DNI    #Progress Note Handoff: Patient with clinical improvement, start d/c home planning ,arrange for home care  Family discussion: d/c plan of tx. d/w patient's family by bedside Disposition: Home___with home care today/vs tomorrow    Total time spent to complete patient's bedside assessment, review medical chart, discuss medical plan of care with covering medical team was more than 35 minutes
Patient is a 77 y/o  female with hx of HTN and COPD (on home O2) was admitted for eval of vomiting , dx. with acute metabolic encephalopathy, aspiration right lung pneumonia, initially admitted to CEU, post medical stabilization downgraded to medical unit with the following course of therapy:     # Acute Toxic Metabolic Encephalopathy - resolved, patient's mentation is at baseline  -  secondary to hypercapnia , resolved post initiation of bipap tx.    # Acute on chronic Hypoxic and Hypercapnic Respiratory Failure secondary to COPD exacerbation - clinically improved with medical tx.   # COPD on home 02( 2.5 L via at home )  - currently on 2.5 L sat above 90%  - bipap q4hrs prn and qhs,  continue nebs, symbicort, singulair   - Pulmonart team on board  -For right lung aspiration pneumonia- patient completing 7 days course of IV Unasyn ( started on 4/28 with 1 dose of IV cefepime/vanco) day 6/7, can switch to PO augmentin on d/c home   - patient was on  IV solumedrol -now on PO Prednisone 20 mg once daily x 5 days    -Patient will benefit from home bipap tx. arrangement, will need close outpatient Pulm. f/up  for further outpatient care of COPD.        # HTN  -well controlled on current regimen    # Anxiety  - on alprazolam and paroxetine    # Gerd   - on protonix    # suspected folic acid deficiency  -on supplement    # Physical deconditioning- post PT eval today, ambulated 10 feet with walker, 1 person assistance.     Code status: DNR/DNI    #Progress Note Handoff: Patient with clinical improvement, d/c home planning ,arrange for home care and home bipap tx.   Family discussion: d/c plan of tx. d/w patient's family by bedside Disposition: Home___with home care today if bipap is arranged    Total time spent to complete patient's bedside assessment, review medical chart, discuss medical plan of care with covering medical team was more than 35 minutes .

## 2022-05-04 NOTE — PROGRESS NOTE ADULT - SUBJECTIVE AND OBJECTIVE BOX
[place], [place]  76y  Female      Patient is a 76y old female who presents with a chief complaint of vomiting (30 Apr 2022 11:41)      INTERVAL HPI/OVERNIGHT EVENTS:  Patient seen and examined earlier this morning  lying in bed  denies any complaints       REVIEW OF SYSTEMS:  CONSTITUTIONAL: No fever, weight loss, or fatigue  EYES: No eye pain, visual disturbances, or discharge  ENMT:  No difficulty hearing, tinnitus, vertigo; No sinus or throat pain  NECK: No pain or stiffness  RESPIRATORY: No cough, wheezing, chills or hemoptysis; No shortness of breath  CARDIOVASCULAR: No chest pain, palpitations, dizziness, or leg swelling  GASTROINTESTINAL: No abdominal or epigastric pain. No nausea, vomiting, or hematemesis; No diarrhea or constipation. No melena or hematochezia.  GENITOURINARY: No dysuria, frequency, hematuria, or incontinence  NEUROLOGICAL: No headaches, memory loss, loss of strength, numbness, or tremors  SKIN: No itching, burning, rashes, or lesions   LYMPH NODES: No enlarged glands  ENDOCRINE: No heat or cold intolerance; No hair loss  MUSCULOSKELETAL: No joint pain or swelling; No muscle, back, or extremity pain  PSYCHIATRIC: No depression, anxiety, mood swings, or difficulty sleeping  HEME/LYMPH: No easy bruising, or bleeding gums  ALLERY AND IMMUNOLOGIC: No hives or eczema    T(C): 36.7 (04-30-22 @ 12:09), Max: 37.5 (04-29-22 @ 15:37)  HR: 74 (04-30-22 @ 12:09) (74 - 95)  BP: 108/56 (04-30-22 @ 12:09) (106/65 - 132/65)  RR: 20 (04-30-22 @ 12:09) (18 - 20)  SpO2: 99% (04-30-22 @ 04:30) (95% - 99%)    PHYSICAL EXAM:  GENERAL: NAD, well-groomed, well-developed  HEAD:  Atraumatic, Normocephalic  EYES: EOMI, PERRLA, conjunctiva and sclera clear  ENMT: No tonsillar erythema, exudates, or enlargement; Moist mucous membranes, Good dentition, No lesions  NECK: Supple, No JVD, Normal thyroid  NERVOUS SYSTEM:  Alert & Oriented X3, Good concentration; Moves all extremities   CHEST/LUNG: Clear to percussion bilaterally; No rales, rhonchi, wheezing, or rubs  HEART: Regular rate and rhythm; No murmurs, rubs, or gallops  ABDOMEN: Soft, Nontender, Nondistended; Bowel sounds present  EXTREMITIES:  2+ Peripheral Pulses, No clubbing, cyanosis, or edema  LYMPH: No lymphadenopathy noted  SKIN: No rashes or lesions    Consultant(s) Notes Reviewed:  [x ] YES  [ ] NO  Care Discussed with Consultants/Other Providers [ x] YES  [ ] NO    LAB:                        9.1    13.70 )-----------( 146      ( 30 Apr 2022 04:30 )             29.1     04-30    140  |  92<L>  |  26<H>  ----------------------------<  110<H>  4.3   |  42<HH>  |  0.8    Ca    9.5      30 Apr 2022 04:30  Mg     2.0     04-30    TPro  5.9<L>  /  Alb  4.1  /  TBili  0.5  /  DBili  x   /  AST  12  /  ALT  5   /  AlkPhos  56  04-29    LIVER FUNCTIONS - ( 29 Apr 2022 06:00 )  Alb: 4.1 g/dL / Pro: 5.9 g/dL / ALK PHOS: 56 U/L / ALT: 5 U/L / AST: 12 U/L / GGT: x           CARDIAC MARKERS ( 30 Apr 2022 04:30 )  x     / <0.01 ng/mL / x     / x     / 2.6 ng/mL  CARDIAC MARKERS ( 29 Apr 2022 12:25 )  x     / <0.01 ng/mL / x     / x     / 2.1 ng/mL  CARDIAC MARKERS ( 28 Apr 2022 15:49 )  x     / <0.01 ng/mL / x     / x     / x              Drug Dosing Weight  Height (cm): 162.6 (30 Apr 2022 10:51)  Weight (kg): 47.6 (30 Apr 2022 00:31)  BMI (kg/m2): 18 (30 Apr 2022 10:51)  BSA (m2): 1.49 (30 Apr 2022 10:51)        I&O's Summary    29 Apr 2022 07:01  -  30 Apr 2022 07:00  --------------------------------------------------------  IN: 0 mL / OUT: 1000 mL / NET: -1000 mL      Urinalysis Basic - ( 29 Apr 2022 08:39 )    Color: Yellow / Appearance: Clear / SG: >1.050 / pH: x  Gluc: x / Ketone: Trace  / Bili: Negative / Urobili: <2 mg/dL   Blood: x / Protein: 100 mg/dL / Nitrite: Negative   Leuk Esterase: Negative / RBC: 7 /HPF / WBC 6 /HPF   Sq Epi: x / Non Sq Epi: 4 /HPF / Bacteria: Negative        RADIOLOGY & ADDITIONAL TESTS:  Imaging Personally Reviewed:  [x] YES  [ ] NO  < from: CT Abdomen and Pelvis w/ IV Cont (04.28.22 @ 18:47) >  IMPRESSION:  Consolidation is noted at the right lung base. Aspiration pneumonia   cannot be excluded.    No evidence of abdominal or pelvic mass or inflammatory process.  < end of copied text >        < from: CT Head No Cont (04.28.22 @ 17:06) >  IMPRESSION:  No acute intracranial pathology.    Stable mild chronic microvascular ischemic changes.    Stable mild ventriculomegaly.  < end of copied text >        MEDS:  ALBUTerol    90 MICROgram(s) HFA Inhaler 2 Puff(s) Inhalation every 4 hours  albuterol/ipratropium for Nebulization 3 milliLiter(s) Nebulizer every 6 hours  ALPRAZolam 0.25 milliGRAM(s) Oral at bedtime  amLODIPine   Tablet 10 milliGRAM(s) Oral daily  ampicillin/sulbactam  IVPB      ampicillin/sulbactam  IVPB 3 Gram(s) IV Intermittent every 6 hours  budesonide 160 MICROgram(s)/formoterol 4.5 MICROgram(s) Inhaler 2 Puff(s) Inhalation two times a day  enalapril 5 milliGRAM(s) Oral daily  folic acid 1 milliGRAM(s) Oral daily  heparin   Injectable 5000 Unit(s) SubCutaneous every 8 hours  melatonin 5 milliGRAM(s) Oral at bedtime  methylPREDNISolone sodium succinate Injectable 40 milliGRAM(s) IV Push every 12 hours  ondansetron Injectable 4 milliGRAM(s) IV Push every 6 hours PRN  pantoprazole  Injectable 40 milliGRAM(s) IV Push at bedtime  PARoxetine 10 milliGRAM(s) Oral daily  tiotropium 18 MICROgram(s) Capsule 1 Capsule(s) Inhalation daily      
Over Night Events: events noted, on NC, better    PHYSICAL EXAM    ICU Vital Signs Last 24 Hrs  T(C): 36.4 (01 May 2022 04:58), Max: 36.7 (30 Apr 2022 12:09)  T(F): 97.5 (01 May 2022 04:58), Max: 98.1 (01 May 2022 01:26)  HR: 64 (01 May 2022 04:58) (64 - 82)  BP: 115/59 (01 May 2022 04:58) (90/72 - 121/63)  BP(mean): 81 (01 May 2022 04:58) (71 - 81)  RR: 20 (01 May 2022 04:58) (19 - 20)  SpO2: 99% (01 May 2022 04:58) (93% - 99%)      General: Ill looking  HEENT: BLANCHE             Lymph Nodes: No cervical LN   Lungs: dec bs both bases  Cardiovascular: AGA 2.6  Abdomen: Soft, Positive BS  Extremities: No clubbing   Skin: Warm  Neurological: Non focal       04-30-22 @ 07:01  -  05-01-22 @ 07:00  --------------------------------------------------------  IN:  Total IN: 0 mL    OUT:    Incontinent per Collection Bag (mL): 250 mL  Total OUT: 250 mL    Total NET: -250 mL          LABS:                          9.1    10.34 )-----------( 149      ( 30 Apr 2022 23:30 )             28.4                                               04-30    143  |  93<L>  |  27<H>  ----------------------------<  216<H>  4.6   |  36<H>  |  0.9    Ca    8.9      30 Apr 2022 23:30  Mg     2.0     04-30    TPro  5.7<L>  /  Alb  3.6  /  TBili  0.3  /  DBili  x   /  AST  9   /  ALT  5   /  AlkPhos  50  04-30                                             Urinalysis Basic - ( 29 Apr 2022 08:39 )    Color: Yellow / Appearance: Clear / SG: >1.050 / pH: x  Gluc: x / Ketone: Trace  / Bili: Negative / Urobili: <2 mg/dL   Blood: x / Protein: 100 mg/dL / Nitrite: Negative   Leuk Esterase: Negative / RBC: 7 /HPF / WBC 6 /HPF   Sq Epi: x / Non Sq Epi: 4 /HPF / Bacteria: Negative        CARDIAC MARKERS ( 30 Apr 2022 04:30 )  x     / <0.01 ng/mL / x     / x     / 2.6 ng/mL  CARDIAC MARKERS ( 29 Apr 2022 12:25 )  x     / <0.01 ng/mL / x     / x     / 2.1 ng/mL                                            LIVER FUNCTIONS - ( 30 Apr 2022 23:30 )  Alb: 3.6 g/dL / Pro: 5.7 g/dL / ALK PHOS: 50 U/L / ALT: 5 U/L / AST: 9 U/L / GGT: x                                                  Culture - Urine (collected 29 Apr 2022 08:37)  Source: Clean Catch Clean Catch (Midstream)  Final Report (30 Apr 2022 14:40):    No growth    Culture - Blood (collected 29 Apr 2022 04:30)  Source: .Blood Blood  Preliminary Report (30 Apr 2022 14:01):    No growth to date.    Culture - Blood (collected 28 Apr 2022 18:30)  Source: .Blood Blood-Peripheral  Preliminary Report (30 Apr 2022 01:02):    No growth to date.    Culture - Blood (collected 28 Apr 2022 18:30)  Source: .Blood Blood-Peripheral  Preliminary Report (30 Apr 2022 01:02):    No growth to date.                                                                                       ABG - ( 30 Apr 2022 12:36 )  pH, Arterial: 7.32  pH, Blood: x     /  pCO2: 96    /  pO2: 147   / HCO3: 50    / Base Excess: 18.2  /  SaO2: 99.3                MEDICATIONS  (STANDING):  ALBUTerol    90 MICROgram(s) HFA Inhaler 2 Puff(s) Inhalation every 4 hours  albuterol/ipratropium for Nebulization 3 milliLiter(s) Nebulizer every 6 hours  ALPRAZolam 0.25 milliGRAM(s) Oral at bedtime  amLODIPine   Tablet 10 milliGRAM(s) Oral daily  ampicillin/sulbactam  IVPB      ampicillin/sulbactam  IVPB 3 Gram(s) IV Intermittent every 6 hours  budesonide 160 MICROgram(s)/formoterol 4.5 MICROgram(s) Inhaler 2 Puff(s) Inhalation two times a day  enalapril 5 milliGRAM(s) Oral daily  folic acid 1 milliGRAM(s) Oral daily  heparin   Injectable 5000 Unit(s) SubCutaneous every 8 hours  melatonin 5 milliGRAM(s) Oral at bedtime  methylPREDNISolone sodium succinate Injectable 40 milliGRAM(s) IV Push every 12 hours  pantoprazole  Injectable 40 milliGRAM(s) IV Push at bedtime  PARoxetine 10 milliGRAM(s) Oral daily  tiotropium 18 MICROgram(s) Capsule 1 Capsule(s) Inhalation daily    MEDICATIONS  (PRN):  ondansetron Injectable 4 milliGRAM(s) IV Push every 6 hours PRN Nausea and/or Vomiting        
VENTURA MOCK 76y Female  MRN#: [id number]   Hospital Day: 4d    SUBJECTIVE  Patient is a 76y old Female who presents with a chief complaint of vomiting (02 May 2022 06:29)  Currently admitted to medicine with the primary diagnosis of Pneumonia, aspiration      INTERVAL HPI AND OVERNIGHT EVENTS:  Patient was examined and seen at bedside. This morning she is resting comfortably in bed. No issues or overnight events.    OBJECTIVE  PAST MEDICAL & SURGICAL HISTORY  COPD (chronic obstructive pulmonary disease)    HTN (hypertension)    Anxiety    History of cholecystectomy    History of hysterectomy      ALLERGIES:  Levaquin (Other)    MEDICATIONS:  STANDING MEDICATIONS  ALBUTerol    90 MICROgram(s) HFA Inhaler 2 Puff(s) Inhalation every 4 hours  albuterol/ipratropium for Nebulization 3 milliLiter(s) Nebulizer every 6 hours  amLODIPine   Tablet 10 milliGRAM(s) Oral daily  ampicillin/sulbactam  IVPB      ampicillin/sulbactam  IVPB 3 Gram(s) IV Intermittent every 6 hours  budesonide 160 MICROgram(s)/formoterol 4.5 MICROgram(s) Inhaler 2 Puff(s) Inhalation two times a day  enalapril 5 milliGRAM(s) Oral daily  folic acid 1 milliGRAM(s) Oral daily  heparin   Injectable 5000 Unit(s) SubCutaneous every 8 hours  melatonin 5 milliGRAM(s) Oral at bedtime  pantoprazole  Injectable 40 milliGRAM(s) IV Push at bedtime  PARoxetine 10 milliGRAM(s) Oral daily  predniSONE   Tablet 20 milliGRAM(s) Oral daily  tiotropium 18 MICROgram(s) Capsule 1 Capsule(s) Inhalation daily    PRN MEDICATIONS  ALPRAZolam 0.25 milliGRAM(s) Oral two times a day PRN  ondansetron Injectable 4 milliGRAM(s) IV Push every 6 hours PRN      VITAL SIGNS: Last 24 Hours  T(C): 35.6 (02 May 2022 04:36), Max: 36.4 (01 May 2022 11:29)  T(F): 96 (02 May 2022 04:36), Max: 97.5 (01 May 2022 11:29)  HR: 80 (02 May 2022 04:36) (75 - 80)  BP: 164/72 (02 May 2022 04:36) (111/61 - 164/72)  BP(mean): 96 (02 May 2022 04:36) (96 - 96)  RR: 20 (02 May 2022 04:36) (20 - 20)  SpO2: 98% (02 May 2022 04:36) (98% - 99%)    LABS:                        8.8    11.49 )-----------( 164      ( 01 May 2022 23:30 )             29.3     05-01    145  |  96<L>  |  18  ----------------------------<  119<H>  4.4   |  42<HH>  |  0.6<L>    Ca    9.4      01 May 2022 23:30  Mg     2.0     05-01    TPro  5.4<L>  /  Alb  3.6  /  TBili  0.2  /  DBili  x   /  AST  7   /  ALT  <5  /  AlkPhos  49  05-01        ABG - ( 30 Apr 2022 12:36 )  pH, Arterial: 7.32  pH, Blood: x     /  pCO2: 96    /  pO2: 147   / HCO3: 50    / Base Excess: 18.2  /  SaO2: 99.3                      RADIOLOGY:      PHYSICAL EXAM:  CONSTITUTIONAL: No acute distress, well-developed, well-groomed, AAOx3  HEAD: Atraumatic, normocephalic  EYES: EOM intact, PERRLA, conjunctiva and sclera clear  ENT: Supple, no masses, no thyromegaly, no bruits, no JVD; moist mucous membranes  PULMONARY: Reduced breath sounds bilaterally over lung bases  CARDIOVASCULAR: Regular rate and rhythm; no murmurs, rubs, or gallops  GASTROINTESTINAL: Soft, non-tender, non-distended; bowel sounds present  MUSCULOSKELETAL: 2+ peripheral pulses; no clubbing, no cyanosis, no edema  NEUROLOGY: non-focal  SKIN: No rashes or lesions; warm and dry  
VENTURA MOCK 76y Female  MRN#: [id number]   CODE STATUS:________    Hospital Day: 2d    Pt is currently admitted with the primary diagnosis of AHRF    Overnight events   -No major overnight events                                          ----------------------------------------------------------  OBJECTIVE  PAST MEDICAL & SURGICAL HISTORY  COPD (chronic obstructive pulmonary disease)    HTN (hypertension)    Anxiety    History of cholecystectomy    History of hysterectomy                                              -----------------------------------------------------------  ALLERGIES:  Levaquin (Other)                                            ------------------------------------------------------------    HOME MEDICATIONS  Home Medications:  albuterol 2.5 mg/3 mL (0.083%) inhalation solution: 3 milliliter(s) inhaled every 6 hours (28 May 2019 08:30)  ALPRAZolam 0.25 mg oral tablet: 1 tab(s) orally 2 times a day, As needed, anxiety (28 May 2019 08:33)  enalapril 5 mg oral tablet: 1 tab(s) orally once a day (23 Oct 2020 15:33)  Incruse Ellipta 62.5 mcg/inh inhalation powder: 1 puff(s) inhaled once a day (28 May 2019 08:30)  Paxil 10 mg oral tablet: 1 tab(s) orally once a day (28 May 2019 08:30)  ProAir HFA 90 mcg/inh inhalation aerosol: 2 puff(s) inhaled 4 times a day (28 May 2019 08:30)                           MEDICATIONS:  STANDING MEDICATIONS  ALBUTerol    90 MICROgram(s) HFA Inhaler 2 Puff(s) Inhalation every 4 hours  albuterol/ipratropium for Nebulization 3 milliLiter(s) Nebulizer every 6 hours  ALPRAZolam 0.25 milliGRAM(s) Oral at bedtime  amLODIPine   Tablet 10 milliGRAM(s) Oral daily  ampicillin/sulbactam  IVPB      ampicillin/sulbactam  IVPB 3 Gram(s) IV Intermittent every 6 hours  budesonide 160 MICROgram(s)/formoterol 4.5 MICROgram(s) Inhaler 2 Puff(s) Inhalation two times a day  enalapril 5 milliGRAM(s) Oral daily  folic acid 1 milliGRAM(s) Oral daily  heparin   Injectable 5000 Unit(s) SubCutaneous every 8 hours  melatonin 5 milliGRAM(s) Oral at bedtime  methylPREDNISolone sodium succinate Injectable 40 milliGRAM(s) IV Push every 12 hours  pantoprazole  Injectable 40 milliGRAM(s) IV Push at bedtime  PARoxetine 10 milliGRAM(s) Oral daily  tiotropium 18 MICROgram(s) Capsule 1 Capsule(s) Inhalation daily    PRN MEDICATIONS  ondansetron Injectable 4 milliGRAM(s) IV Push every 6 hours PRN                                            ------------------------------------------------------------  VITAL SIGNS: Last 24 Hours  T(C): 36.5 (30 Apr 2022 08:44), Max: 37.5 (29 Apr 2022 15:37)  T(F): 97.7 (30 Apr 2022 08:44), Max: 99.5 (29 Apr 2022 15:37)  HR: 82 (30 Apr 2022 08:44) (78 - 95)  BP: 121/63 (30 Apr 2022 08:44) (106/65 - 132/65)  BP(mean): --  RR: 19 (30 Apr 2022 08:44) (18 - 20)  SpO2: 99% (30 Apr 2022 04:30) (95% - 99%)      04-29-22 @ 07:01  -  04-30-22 @ 07:00  --------------------------------------------------------  IN: 0 mL / OUT: 1000 mL / NET: -1000 mL                                             --------------------------------------------------------------  LABS:                        9.1    13.70 )-----------( 146      ( 30 Apr 2022 04:30 )             29.1     04-30    140  |  92<L>  |  26<H>  ----------------------------<  110<H>  4.3   |  42<HH>  |  0.8    Ca    9.5      30 Apr 2022 04:30  Mg     2.0     04-30    TPro  5.9<L>  /  Alb  4.1  /  TBili  0.5  /  DBili  x   /  AST  12  /  ALT  5   /  AlkPhos  56  04-29      Urinalysis Basic - ( 29 Apr 2022 08:39 )    Color: Yellow / Appearance: Clear / SG: >1.050 / pH: x  Gluc: x / Ketone: Trace  / Bili: Negative / Urobili: <2 mg/dL   Blood: x / Protein: 100 mg/dL / Nitrite: Negative   Leuk Esterase: Negative / RBC: 7 /HPF / WBC 6 /HPF   Sq Epi: x / Non Sq Epi: 4 /HPF / Bacteria: Negative        Troponin T, Serum: <0.01 ng/mL (04-30-22 @ 04:30)  Troponin T, Serum: <0.01 ng/mL (04-29-22 @ 12:25)        Culture - Blood (collected 28 Apr 2022 18:30)  Source: .Blood Blood-Peripheral  Preliminary Report (30 Apr 2022 01:02):    No growth to date.    Culture - Blood (collected 28 Apr 2022 18:30)  Source: .Blood Blood-Peripheral  Preliminary Report (30 Apr 2022 01:02):    No growth to date.        CARDIAC MARKERS ( 30 Apr 2022 04:30 )  x     / <0.01 ng/mL / x     / x     / 2.6 ng/mL  CARDIAC MARKERS ( 29 Apr 2022 12:25 )  x     / <0.01 ng/mL / x     / x     / 2.1 ng/mL  CARDIAC MARKERS ( 28 Apr 2022 15:49 )  x     / <0.01 ng/mL / x     / x     / x                                                    --------------------------------------------------------------    PHYSICAL EXAM:  CONSTITUTIONAL: No acute distress, well-developed, well-groomed, AAOx3  HEAD: Atraumatic, normocephalic  EYES: EOM intact, PERRLA, conjunctiva and sclera clear  ENT: Supple, no masses, no thyromegaly, no bruits, no JVD; moist mucous membranes  PULMONARY: Reduced breath sounds bilaterally over lung bases  CARDIOVASCULAR: Regular rate and rhythm; no murmurs, rubs, or gallops  GASTROINTESTINAL: Soft, non-tender, non-distended; bowel sounds present  MUSCULOSKELETAL: 2+ peripheral pulses; no clubbing, no cyanosis, no edema  NEUROLOGY: non-focal  SKIN: No rashes or lesions; warm and dry                                           --------------------------------------------------------------  
Hospital Day:  5d    Subjective: Patient is a 76y old  Female who presents with a chief complaint of vomiting (02 May 2022 14:40)      Pt seen and evaluated at bedside.   Complaints: None  Over the night Events: None    Past Medical Hx:   COPD (chronic obstructive pulmonary disease)    HTN (hypertension)    Anxiety      Past Sx:  History of cholecystectomy    History of hysterectomy      Allergies:  Levaquin (Other)    Current Meds:   Standng Meds:  ALBUTerol    90 MICROgram(s) HFA Inhaler 2 Puff(s) Inhalation every 4 hours  albuterol/ipratropium for Nebulization 3 milliLiter(s) Nebulizer every 6 hours  amLODIPine   Tablet 10 milliGRAM(s) Oral daily  ampicillin/sulbactam  IVPB 3 Gram(s) IV Intermittent every 6 hours  ampicillin/sulbactam  IVPB      budesonide 160 MICROgram(s)/formoterol 4.5 MICROgram(s) Inhaler 2 Puff(s) Inhalation two times a day  enalapril 5 milliGRAM(s) Oral daily  folic acid 1 milliGRAM(s) Oral daily  heparin   Injectable 5000 Unit(s) SubCutaneous every 8 hours  melatonin 5 milliGRAM(s) Oral at bedtime  PARoxetine 10 milliGRAM(s) Oral daily  predniSONE   Tablet 20 milliGRAM(s) Oral daily  tiotropium 18 MICROgram(s) Capsule 1 Capsule(s) Inhalation daily    PRN Meds:  ALPRAZolam 0.25 milliGRAM(s) Oral two times a day PRN anxiety while on oxygen  ondansetron Injectable 4 milliGRAM(s) IV Push every 6 hours PRN Nausea and/or Vomiting      Vital Signs:   T(F): 97.4 (05-03-22 @ 05:00), Max: 100.4 (05-02-22 @ 20:34)  HR: 63 (05-03-22 @ 05:00) (63 - 79)  BP: 122/73 (05-03-22 @ 05:00) (100/53 - 152/68)  RR: 19 (05-03-22 @ 05:00) (19 - 19)  SpO2: 96% (05-03-22 @ 05:00) (94% - 96%)    Physical Exam:   CONSTITUTIONAL: No acute distress, well-developed, well-groomed, AAOx3  HEAD: Atraumatic, normocephalic  EYES: EOM intact, PERRLA, conjunctiva and sclera clear  ENT: Supple, no masses, no thyromegaly, no bruits, no JVD; moist mucous membranes  PULMONARY: Reduced breath sounds bilaterally over lung bases  CARDIOVASCULAR: Regular rate and rhythm; no murmurs, rubs, or gallops  GASTROINTESTINAL: Soft, non-tender, non-distended; bowel sounds present  MUSCULOSKELETAL: 2+ peripheral pulses; no clubbing, no cyanosis, no edema  NEUROLOGY: non-focal  SKIN: No rashes or lesions; warm and dry    FLUID BALANCE    05-01-22 @ 07:01  -  05-02-22 @ 07:00  --------------------------------------------------------  IN: 320 mL / OUT: 250 mL / NET: 70 mL        Labs:                         10.7   9.53  )-----------( 178      ( 03 May 2022 08:12 )             34.9     Neutophil% 78.0, Lymphocyte% 10.0, Monocyte% 10.6, Bands% 0.5 05-03-22 @ 08:12    03 May 2022 08:11    144    |  92     |  11     ----------------------------<  99     4.2     |  44     |  0.6      Ca    9.7        03 May 2022 08:11  Mg     2.0       03 May 2022 08:11    TPro  5.6    /  Alb  3.9    /  TBili  0.4    /  DBili  x      /  AST  10     /  ALT  7      /  AlkPhos  56     03 May 2022 08:11        Amylase --, Lipase 78, 04-28-22 @ 15:49      Serum Pro-Brain Natriuretic Peptide: 327 pg/mL (04-28-22 @ 15:49)    Troponin <0.01, CKMB 2.6, CK -- 04-30-22 @ 04:30  Troponin <0.01, CKMB 2.1, CK -- 04-29-22 @ 12:25              Culture - Blood (collected 04-29-22 @ 04:30)  Source: .Blood Blood  Preliminary Report (04-30-22 @ 14:01):    No growth to date.    Culture - Blood (collected 04-28-22 @ 18:30)  Source: .Blood Blood-Peripheral  Preliminary Report (04-30-22 @ 01:02):    No growth to date.    Culture - Blood (collected 04-28-22 @ 18:30)  Source: .Blood Blood-Peripheral  Preliminary Report (04-30-22 @ 01:02):    No growth to date.        Procalcitonin, Serum: 0.33 ng/mL (04-29-22 @ 06:00)            Radiology:     < from: Xray Chest 1 View- PORTABLE-Routine (Xray Chest 1 View- PORTABLE-Routine in AM.) (05.02.22 @ 08:06) >  FINDINGS:    SUPPORT DEVICES: None.    CARDIAC/MEDIASTINUM/HILUM: Stable cardiac silhouette.    LUNG PARENCHYMA/PLEURA: Stable bilateral opacities. No pneumothorax.    SKELETON/SOFT TISSUES: Stable.    IMPRESSION:    Stablel bilateral opacities.    --- End of Report ---        < end of copied text >    MEDICATIONS  (STANDING):  ALBUTerol    90 MICROgram(s) HFA Inhaler 2 Puff(s) Inhalation every 4 hours  albuterol/ipratropium for Nebulization 3 milliLiter(s) Nebulizer every 6 hours  amLODIPine   Tablet 10 milliGRAM(s) Oral daily  ampicillin/sulbactam  IVPB 3 Gram(s) IV Intermittent every 6 hours  ampicillin/sulbactam  IVPB      budesonide 160 MICROgram(s)/formoterol 4.5 MICROgram(s) Inhaler 2 Puff(s) Inhalation two times a day  enalapril 5 milliGRAM(s) Oral daily  folic acid 1 milliGRAM(s) Oral daily  heparin   Injectable 5000 Unit(s) SubCutaneous every 8 hours  melatonin 5 milliGRAM(s) Oral at bedtime  PARoxetine 10 milliGRAM(s) Oral daily  predniSONE   Tablet 20 milliGRAM(s) Oral daily  tiotropium 18 MICROgram(s) Capsule 1 Capsule(s) Inhalation daily  MEDICATIONS  (PRN):  ALPRAZolam 0.25 milliGRAM(s) Oral two times a day PRN anxiety while on oxygen  ondansetron Injectable 4 milliGRAM(s) IV Push every 6 hours PRN Nausea and/or Vomiting    
Over Night Events: events noted on NC feels better, occ cough    PHYSICAL EXAM    ICU Vital Signs Last 24 Hrs  T(C): 36.6 (30 Apr 2022 04:30), Max: 37.5 (29 Apr 2022 15:37)  T(F): 97.9 (30 Apr 2022 04:30), Max: 99.5 (29 Apr 2022 15:37)  HR: 78 (30 Apr 2022 04:30) (77 - 95)  BP: 123/62 (30 Apr 2022 04:30) (106/65 - 132/65)  RR: 19 (30 Apr 2022 04:30) (18 - 20)  SpO2: 99% (30 Apr 2022 04:30) (95% - 99%)      General: ill looking  HEENT: BLANCHE             Lymph Nodes: No cervical LN   Lungs: dec bs both bases  Cardiovascular: REM 2.6  Abdomen: Soft, Positive BS  Extremities: No clubbing   Neurological: Non focal       04-29-22 @ 07:01  -  04-30-22 @ 07:00  --------------------------------------------------------  IN:  Total IN: 0 mL    OUT:    Incontinent per Collection Bag (mL): 1000 mL  Total OUT: 1000 mL    Total NET: -1000 mL          LABS:                          9.1    13.70 )-----------( x        ( 30 Apr 2022 04:30 )             29.1                                               04-29    141  |  90<L>  |  24<H>  ----------------------------<  185<H>  4.1   |  39<H>  |  0.8    Ca    9.5      29 Apr 2022 20:00  Mg     1.8     04-29    TPro  5.9<L>  /  Alb  4.1  /  TBili  0.5  /  DBili  x   /  AST  12  /  ALT  5   /  AlkPhos  56  04-29                                             Urinalysis Basic - ( 29 Apr 2022 08:39 )    Color: Yellow / Appearance: Clear / SG: >1.050 / pH: x  Gluc: x / Ketone: Trace  / Bili: Negative / Urobili: <2 mg/dL   Blood: x / Protein: 100 mg/dL / Nitrite: Negative   Leuk Esterase: Negative / RBC: 7 /HPF / WBC 6 /HPF   Sq Epi: x / Non Sq Epi: 4 /HPF / Bacteria: Negative        CARDIAC MARKERS ( 30 Apr 2022 04:30 )  x     / <0.01 ng/mL / x     / x     / 2.6 ng/mL  CARDIAC MARKERS ( 29 Apr 2022 12:25 )  x     / <0.01 ng/mL / x     / x     / 2.1 ng/mL  CARDIAC MARKERS ( 28 Apr 2022 15:49 )  x     / <0.01 ng/mL / x     / x     / x                                                LIVER FUNCTIONS - ( 29 Apr 2022 06:00 )  Alb: 4.1 g/dL / Pro: 5.9 g/dL / ALK PHOS: 56 U/L / ALT: 5 U/L / AST: 12 U/L / GGT: x                                                  Culture - Blood (collected 28 Apr 2022 18:30)  Source: .Blood Blood-Peripheral  Preliminary Report (30 Apr 2022 01:02):    No growth to date.    Culture - Blood (collected 28 Apr 2022 18:30)  Source: .Blood Blood-Peripheral  Preliminary Report (30 Apr 2022 01:02):    No growth to date.                                                                                           MEDICATIONS  (STANDING):  ALBUTerol    90 MICROgram(s) HFA Inhaler 2 Puff(s) Inhalation every 4 hours  albuterol/ipratropium for Nebulization 3 milliLiter(s) Nebulizer every 6 hours  ALPRAZolam 0.25 milliGRAM(s) Oral at bedtime  amLODIPine   Tablet 10 milliGRAM(s) Oral daily  ampicillin/sulbactam  IVPB      ampicillin/sulbactam  IVPB 3 Gram(s) IV Intermittent every 6 hours  budesonide 160 MICROgram(s)/formoterol 4.5 MICROgram(s) Inhaler 2 Puff(s) Inhalation two times a day  enalapril 5 milliGRAM(s) Oral daily  folic acid 1 milliGRAM(s) Oral daily  heparin   Injectable 5000 Unit(s) SubCutaneous every 8 hours  melatonin 5 milliGRAM(s) Oral at bedtime  methylPREDNISolone sodium succinate Injectable 40 milliGRAM(s) IV Push every 12 hours  pantoprazole  Injectable 40 milliGRAM(s) IV Push at bedtime  PARoxetine 10 milliGRAM(s) Oral daily  tiotropium 18 MICROgram(s) Capsule 1 Capsule(s) Inhalation daily    MEDICATIONS  (PRN):  ondansetron Injectable 4 milliGRAM(s) IV Push every 6 hours PRN Nausea and/or Vomiting      Xrays:                                                                                     ECHO    
Over Night Events: events noted, on NC, NIV at night, want to go home    PHYSICAL EXAM    ICU Vital Signs Last 24 Hrs  T(C): 36.2 (04 May 2022 04:56), Max: 37.2 (03 May 2022 19:15)  T(F): 97.1 (04 May 2022 04:56), Max: 99 (03 May 2022 19:15)  HR: 66 (04 May 2022 04:56) (66 - 88)  BP: 153/70 (04 May 2022 04:56) (117/59 - 153/70)  RR: 18 (04 May 2022 04:56) (18 - 19)  SpO2: 97% (04 May 2022 04:56) (93% - 97%)      General: ill looking  HEENT: BLANCHE             Lungs: dec bs both bases  Cardiovascular: AGA 2.6  Abdomen: Soft, Positive BS  Extremities: No clubbing   Neurological: Non focal         LABS:                          9.0    8.11  )-----------( 167      ( 04 May 2022 02:00 )             29.0                                               05-04    145  |  95<L>  |  14  ----------------------------<  96  4.3   |  42<HH>  |  0.8    Ca    9.1      04 May 2022 02:00  Mg     2.0     05-04    TPro  4.9<L>  /  Alb  3.2<L>  /  TBili  0.2  /  DBili  x   /  AST  10  /  ALT  8   /  AlkPhos  48  05-04                                                                                           LIVER FUNCTIONS - ( 04 May 2022 02:00 )  Alb: 3.2 g/dL / Pro: 4.9 g/dL / ALK PHOS: 48 U/L / ALT: 8 U/L / AST: 10 U/L / GGT: x                                                                                                                                       MEDICATIONS  (STANDING):  ALBUTerol    90 MICROgram(s) HFA Inhaler 2 Puff(s) Inhalation every 4 hours  albuterol/ipratropium for Nebulization 3 milliLiter(s) Nebulizer every 6 hours  amLODIPine   Tablet 10 milliGRAM(s) Oral daily  ampicillin/sulbactam  IVPB      ampicillin/sulbactam  IVPB 3 Gram(s) IV Intermittent every 6 hours  budesonide 160 MICROgram(s)/formoterol 4.5 MICROgram(s) Inhaler 2 Puff(s) Inhalation two times a day  enalapril 5 milliGRAM(s) Oral daily  folic acid 1 milliGRAM(s) Oral daily  heparin   Injectable 5000 Unit(s) SubCutaneous every 8 hours  melatonin 5 milliGRAM(s) Oral at bedtime  PARoxetine 10 milliGRAM(s) Oral daily  predniSONE   Tablet 20 milliGRAM(s) Oral daily  tiotropium 18 MICROgram(s) Capsule 1 Capsule(s) Inhalation daily    MEDICATIONS  (PRN):  ALPRAZolam 0.25 milliGRAM(s) Oral two times a day PRN anxiety while on oxygen  ondansetron Injectable 4 milliGRAM(s) IV Push every 6 hours PRN Nausea and/or Vomiting          
  [place], [place]  76y  Female      Patient is a 76y old female who presents with a chief complaint of vomiting (30 Apr 2022 11:41)      INTERVAL HPI/OVERNIGHT EVENTS:  Patient seen and examined earlier this morning with her son bedside  she is awake and alert  lying in bed  denies any complaints   states she has episodes of anxiety and wants xanax that she takes at home     REVIEW OF SYSTEMS:  CONSTITUTIONAL: No fever, weight loss, or fatigue  EYES: No eye pain, visual disturbances, or discharge  ENMT:  No difficulty hearing, tinnitus, vertigo; No sinus or throat pain  NECK: No pain or stiffness  RESPIRATORY: No cough, wheezing, chills or hemoptysis; No shortness of breath  CARDIOVASCULAR: No chest pain, palpitations, dizziness, or leg swelling  GASTROINTESTINAL: No abdominal or epigastric pain. No nausea, vomiting, or hematemesis; No diarrhea or constipation. No melena or hematochezia.  GENITOURINARY: No dysuria, frequency, hematuria, or incontinence  NEUROLOGICAL: No headaches, memory loss, loss of strength, numbness, or tremors  SKIN: No itching, burning, rashes, or lesions   LYMPH NODES: No enlarged glands  ENDOCRINE: No heat or cold intolerance; No hair loss  MUSCULOSKELETAL: No joint pain or swelling; No muscle, back, or extremity pain  PSYCHIATRIC: No depression, anxiety, mood swings, or difficulty sleeping  HEME/LYMPH: No easy bruising, or bleeding gums  ALLERY AND IMMUNOLOGIC: No hives or eczema    Vital Signs Last 24 Hrs  T(C): 36.6 (01 May 2022 07:54), Max: 36.7 (30 Apr 2022 12:09)  T(F): 97.9 (01 May 2022 07:54), Max: 98.1 (01 May 2022 01:26)  HR: 67 (01 May 2022 07:54) (64 - 79)  BP: 115/59 (01 May 2022 07:54) (90/72 - 115/59)  BP(mean): 81 (01 May 2022 04:58) (71 - 81)  RR: 20 (01 May 2022 07:54) (20 - 20)  SpO2: 100% (01 May 2022 07:54) (93% - 100%) on nc    PHYSICAL EXAM:  GENERAL: NAD, elderly, thin female   HEAD:  Atraumatic, Normocephalic  EYES: EOMI, PERRLA, conjunctiva and sclera clear  ENMT: No tonsillar erythema, exudates, or enlargement; Moist mucous membranes, Good dentition, No lesions  NECK: Supple, No JVD, Normal thyroid  NERVOUS SYSTEM:  Alert & Oriented X3, Good concentration; Moves all extremities   CHEST/LUNG: decreased breath sounds b/l   HEART: Regular rate and rhythm; No murmurs, rubs, or gallops  ABDOMEN: Soft, Nontender, Nondistended; Bowel sounds present  EXTREMITIES:  2+ Peripheral Pulses, No clubbing, cyanosis, or edema  LYMPH: No lymphadenopathy noted  SKIN: No rashes or lesions    Consultant(s) Notes Reviewed:  [x ] YES  [ ] NO  Care Discussed with Consultants/Other Providers [ x] YES  [ ] NO    LAB:                                   9.1    10.34 )-----------( 149      ( 30 Apr 2022 23:30 )             28.4     04-30    143  |  93<L>  |  27<H>  ----------------------------<  216<H>  4.6   |  36<H>  |  0.9    Ca    8.9      30 Apr 2022 23:30  Mg     2.0     04-30    TPro  5.7<L>  /  Alb  3.6  /  TBili  0.3  /  DBili  x   /  AST  9   /  ALT  5   /  AlkPhos  50  04-30          Drug Dosing Weight  Height (cm): 162.6 (30 Apr 2022 10:51)  Weight (kg): 47.6 (30 Apr 2022 00:31)  BMI (kg/m2): 18 (30 Apr 2022 10:51)  BSA (m2): 1.49 (30 Apr 2022 10:51)        Urinalysis Basic - ( 29 Apr 2022 08:39 )    Color: Yellow / Appearance: Clear / SG: >1.050 / pH: x  Gluc: x / Ketone: Trace  / Bili: Negative / Urobili: <2 mg/dL   Blood: x / Protein: 100 mg/dL / Nitrite: Negative   Leuk Esterase: Negative / RBC: 7 /HPF / WBC 6 /HPF   Sq Epi: x / Non Sq Epi: 4 /HPF / Bacteria: Negative        RADIOLOGY & ADDITIONAL TESTS:  Imaging Personally Reviewed:  [x] YES  [ ] NO  < from: CT Abdomen and Pelvis w/ IV Cont (04.28.22 @ 18:47) >  IMPRESSION:  Consolidation is noted at the right lung base. Aspiration pneumonia   cannot be excluded.    No evidence of abdominal or pelvic mass or inflammatory process.  < end of copied text >        < from: CT Head No Cont (04.28.22 @ 17:06) >  IMPRESSION:  No acute intracranial pathology.    Stable mild chronic microvascular ischemic changes.    Stable mild ventriculomegaly.  < end of copied text >        MEDS:  MEDICATIONS  (STANDING):  ALBUTerol    90 MICROgram(s) HFA Inhaler 2 Puff(s) Inhalation every 4 hours  albuterol/ipratropium for Nebulization 3 milliLiter(s) Nebulizer every 6 hours  amLODIPine   Tablet 10 milliGRAM(s) Oral daily  ampicillin/sulbactam  IVPB      ampicillin/sulbactam  IVPB 3 Gram(s) IV Intermittent every 6 hours  budesonide 160 MICROgram(s)/formoterol 4.5 MICROgram(s) Inhaler 2 Puff(s) Inhalation two times a day  enalapril 5 milliGRAM(s) Oral daily  folic acid 1 milliGRAM(s) Oral daily  heparin   Injectable 5000 Unit(s) SubCutaneous every 8 hours  melatonin 5 milliGRAM(s) Oral at bedtime  methylPREDNISolone sodium succinate Injectable 40 milliGRAM(s) IV Push every 12 hours  pantoprazole  Injectable 40 milliGRAM(s) IV Push at bedtime  PARoxetine 10 milliGRAM(s) Oral daily  tiotropium 18 MICROgram(s) Capsule 1 Capsule(s) Inhalation daily    MEDICATIONS  (PRN):  ALPRAZolam 0.25 milliGRAM(s) Oral two times a day PRN anxiety while on oxygen  ondansetron Injectable 4 milliGRAM(s) IV Push every 6 hours PRN Nausea and/or Vomiting      
Over Night Events: events noted, on NC, transferred from SDU, occ cough    PHYSICAL EXAM    ICU Vital Signs Last 24 Hrs  T(C): 35.6 (02 May 2022 04:36), Max: 36.6 (01 May 2022 07:54)  T(F): 96 (02 May 2022 04:36), Max: 97.9 (01 May 2022 07:54)  HR: 80 (02 May 2022 04:36) (67 - 80)  BP: 164/72 (02 May 2022 04:36) (111/61 - 164/72)  BP(mean): 96 (02 May 2022 04:36) (96 - 96)  RR: 20 (02 May 2022 04:36) (20 - 20)  SpO2: 98% (02 May 2022 04:36) (98% - 100%)      General: ill looking  HEENT: BLANCHE             Lymph Nodes: No cervical LN   Lungs: dec bs both bases  Cardiovascular: AGA 2.6  Abdomen: Soft, Positive BS  Neurological: Non focal       04-30-22 @ 07:01  -  05-01-22 @ 07:00  --------------------------------------------------------  IN:  Total IN: 0 mL    OUT:    Incontinent per Collection Bag (mL): 250 mL  Total OUT: 250 mL    Total NET: -250 mL      05-01-22 @ 07:01  -  05-02-22 @ 06:30  --------------------------------------------------------  IN:    Oral Fluid: 320 mL  Total IN: 320 mL    OUT:    Voided (mL): 250 mL  Total OUT: 250 mL    Total NET: 70 mL          LABS:                          8.8    11.49 )-----------( 164      ( 01 May 2022 23:30 )             29.3                                               05-01    145  |  96<L>  |  18  ----------------------------<  119<H>  4.4   |  42<HH>  |  0.6<L>    Ca    9.4      01 May 2022 23:30  Mg     2.0     05-01    TPro  5.4<L>  /  Alb  3.6  /  TBili  0.2  /  DBili  x   /  AST  7   /  ALT  <5  /  AlkPhos  49  05-01                                                                                           LIVER FUNCTIONS - ( 01 May 2022 23:30 )  Alb: 3.6 g/dL / Pro: 5.4 g/dL / ALK PHOS: 49 U/L / ALT: <5 U/L / AST: 7 U/L / GGT: x                                                  Culture - Urine (collected 29 Apr 2022 08:37)  Source: Clean Catch Clean Catch (Midstream)  Final Report (30 Apr 2022 14:40):    No growth                                                                                       ABG - ( 30 Apr 2022 12:36 )  pH, Arterial: 7.32  pH, Blood: x     /  pCO2: 96    /  pO2: 147   / HCO3: 50    / Base Excess: 18.2  /  SaO2: 99.3                MEDICATIONS  (STANDING):  ALBUTerol    90 MICROgram(s) HFA Inhaler 2 Puff(s) Inhalation every 4 hours  albuterol/ipratropium for Nebulization 3 milliLiter(s) Nebulizer every 6 hours  amLODIPine   Tablet 10 milliGRAM(s) Oral daily  ampicillin/sulbactam  IVPB      ampicillin/sulbactam  IVPB 3 Gram(s) IV Intermittent every 6 hours  budesonide 160 MICROgram(s)/formoterol 4.5 MICROgram(s) Inhaler 2 Puff(s) Inhalation two times a day  enalapril 5 milliGRAM(s) Oral daily  folic acid 1 milliGRAM(s) Oral daily  heparin   Injectable 5000 Unit(s) SubCutaneous every 8 hours  melatonin 5 milliGRAM(s) Oral at bedtime  pantoprazole  Injectable 40 milliGRAM(s) IV Push at bedtime  PARoxetine 10 milliGRAM(s) Oral daily  predniSONE   Tablet 20 milliGRAM(s) Oral daily  tiotropium 18 MICROgram(s) Capsule 1 Capsule(s) Inhalation daily    MEDICATIONS  (PRN):  ALPRAZolam 0.25 milliGRAM(s) Oral two times a day PRN anxiety while on oxygen  ondansetron Injectable 4 milliGRAM(s) IV Push every 6 hours PRN Nausea and/or Vomiting

## 2022-05-04 NOTE — PROGRESS NOTE ADULT - PROVIDER SPECIALTY LIST ADULT
Pulmonology
Hospitalist
Internal Medicine
Internal Medicine
Pulmonology
Hospitalist
Internal Medicine

## 2022-05-04 NOTE — PROGRESS NOTE ADULT - ASSESSMENT
76 yr old female with hx of HTN and COPD (on home O2) was admitted to medicine for confusion and vomiting.     # Toxic Metabolic Encephalopathy suspected secondary to hypercapnia vs. aspiration pna  # Acute on chronic Hypoxic and Hypercapnic Respiratory Failure secondary to COPD exacerbation - improving  # COPD on home 02  - currently on 4L nc - taper as tolerated - pt is on home 02  - abg to rule out retention if pt develops confusion   - bipap q4hrs prn and qhs  - continue nebs, symbicort, singulair   - pulm following   - speech and swallow eval appreciated - on diet and tolerating  - on IV solumedrol - taper as tolerated   - repeat cxr in am  - continue unasyn   - follow up cultures  - oob to chair/pt eval ordered    # HTN  -well controlled on current regimen    # Anxiety  - on alprazolam and paroxetine    # Gerd   - on protonix    # suspected folic acid deficiency  -on supplement    PT consult ordered    Progress Note Handoff  Pending Consults: pulm follow up, pt consult  Pending Tests: labs, cxr  Pending Results: as above  Family Discussion: Discussed diet, medication and overall plan of care with pt and medical staff and her son, [place], bedside.   Disposition: Home_____/SNF______/Other_____/Unknown at this time_x____  Spent over 35 min reviewing chart and on coordinating patient care during interdisciplinary rounds     Please call me with any questions at extension [phone]
IMPRESSION:    Acute on chronic hypercapnic resp failure improved  COPD exacerbation  RLL pneumonia/ highly aspiration  altered MS toxic metabolic  Vomiting/ CT AP reviewed      PLAN:    CNS: Avoid CNS depressant    HEENT:  Oral care    PULMONARY:  HOB @ 45 degrees, aspiration precaution, NC prednisone 20 daily for 5 days    CARDIOVASCULAR: avoid overload    GI: GI prophylaxis                                          Feeding po    RENAL:  F/u  lytes.  Correct as needed. accurate I/O    INFECTIOUS DISEASE: abx, procal    HEMATOLOGICAL:  DVT prophylaxis.     ENDOCRINE:  Follow up FS.  Insulin protocol if needed.      will follow    
IMPRESSION:    Acute on chronic hypercapnic resp failure improved  COPD exacerbation  RLL pneumonia/ highly aspiration  altered MS toxic metabolic  Vomiting/ CT AP reviewed      PLAN:    CNS: Avoid CNS depressant    HEENT:  Oral care    PULMONARY:  HOB @ 45 degrees, aspiration precaution, NC prednisone 20 daily for 5 days    CARDIOVASCULAR: avoid overload    GI: GI prophylaxis                                          Feeding po    RENAL:  F/u  lytes.  Correct as needed. accurate I/O    INFECTIOUS DISEASE: abx, procal    HEMATOLOGICAL:  DVT prophylaxis.     ENDOCRINE:  Follow up FS.  Insulin protocol if needed.    FLOOR    
76 yr old female with hx of HTN and COPD (on home O2) was admitted to medicine for confusion and vomiting.     # Toxic Metabolic Encephalopathy suspected secondary to hypercapnia vs. aspiration pna  # Acute on chronic Hypoxic and Hypercapnic Respiratory Failure secondary to COPD exacerbation - improving  # COPD on home 02  - currently on 4L nc  - stat abg now to rule out co2 retention    - bipap q4hrs prn and qhs  - continue nebs, symbicort, singulair   - pulm following   - speech and swallow eval appreciated - on diet and tolerating  - repeat cxr in am  - continue unasyn     # HTN  -well controlled on current regimen    # Anxiety  - on alprazolam and paroxetine    # Gerd   - on protonix    # suspected folic acid deficiency  -on supplement    PT consult ordered    Progress Note Handoff  Pending Consults: pulm follow up, pt  Pending Tests: labs, cxr  Pending Results: as above  Family Discussion: Discussed diet, medication and overll plan of care with pt and medical staff.  Attempted to call next of kinJosh - no answer  Disposition: Home_____/SNF______/Other_____/Unknown at this time_x____  Spent over 35 min reviewing chart and on coordinating patient care during interdisciplinary rounds     Please call me with any questions at extension [phone]
77 yo F w/ pmhx HTN and COPD on home O2 presents to ED c/o n/v since yesterday. lives alone, but downstairs from sister. son HCP. per EMS was found in vomit and altered, but A&Ox3 in ED on arrival. limited hx as son was not present at the house, arrived to ED to meet pt here afterwards. vomited once in ED- bilious. Denies fever/chill/HA/dizziness/chest pain/palpitation/sob/black stool/bloody stool/urinary sxs. On admission: pt was afebrile, HD stable, on bipap saturating at 100%, WBC 17.60, VB.30/103/33/51. xray unremarkable. Ct abd: Consolidation is noted at the right lung base.       #AHRF likely 2/2 aspiration PNA  -  On admission: pt was afebrile, HD stable, on bipap saturating at 100%,  - WBC 17.60, VB.30/103/33/51  - xray unremarkable  - Ct abd: Consolidation is noted at the right lung base.   - started on unasyn  - Monitor pulse, keep 88-92%  - c/w duonebs PRN and standing, Symbicort, spiriva, BiPAP PRN    #HTN  - c/w home meds    #depression  - c/w paroxetine     #anxiety  - c/w home dose benzo    #Misc  - DVT Prophylaxis: heparin  - Diet: minced, moist  - GI Prophylaxis: PPI  - Activity: AAT    - Code Status: DNR      
77 yo F w/ pmhx HTN and COPD on home O2 presents to ED c/o n/v since yesterday. lives alone, but downstairs from sister. son HCP. per EMS was found in vomit and altered, but A&Ox3 in ED on arrival. limited hx as son was not present at the house, arrived to ED to meet pt here afterwards. vomited once in ED- bilious. Denies fever/chill/HA/dizziness/chest pain/palpitation/sob/black stool/bloody stool/urinary sxs. On admission: pt was afebrile, HD stable, on bipap saturating at 100%, WBC 17.60, VB.30/103/33/51. xray unremarkable. Ct abd: Consolidation is noted at the right lung base.     #AHHRF likely 2/2 aspiration PNA and COPD exacerbation   -  On admission: pt was afebrile, HD stable, on bipap saturating at 100%,  - WBC 17.60, VB.30/103/33/51  - Ct abd: Consolidation is noted at the right lung base.   - c/w duonebs PRN and standing, Symbicort, spiriva, BiPAP qhs  - Wean oxygen as tolerated, Maintain SPO2 88-92%  - changed from solumedrol to oral prednisone 20mg for 5 day course (start , end )  - c/w unasyn 7 days (started , end date )  - Pulm on board : NC prednisone 20 daily for 5 days, NIV at night    #HTN  - c/w home meds    #Depression  - c/w paroxetine     #Anxiety  - c/w home dose benzo    #Suspected folic acid deficiency  -on supplement    #Misc  - DVT Prophylaxis: heparin  - Diet: minced, moist  - GI Prophylaxis: PPI  - Activity: AAT  - Code Status: DNR    LOMN    77 yo F admitted with Chronic hypercapnic respiratory failure secondary to COPD, on admission the patient was retaining high CO2 levels of 103 (>52) mmHG.  The pt needsa home NIV given persistent hypercapnia. The NIV will provide adequate continuous ventilation to keep his hypercapnia under control. This NIV will provide a longer expiratory time to reduce the effects of flow limitation and air trapping. This will decrease the work of breathing, decrease the amount of CO2 and increase oxygenation. Without this NIV the patient will clinically decline and will cause readmission. This pt has tried on BIPAP Setting of 10/5 CM. She continued to retain high CO2 levels of 96. BIPAP was rulled out due to high CO2 levels obtained on BIPAP. This pt requires augmented volume non invasive ventilation not found on a standard BIPAP. The pt will need this NIV for a safe discharge. Please expedite authorization.     
IMPRESSION:    Acute on chronic hypercapnic resp failure improved  COPD exacerbation  RLL pneumonia/ highly aspiration  altered MS toxic metabolic  Vomiting/ CT AP reviewed      PLAN:    CNS: Avoid CNS depressant    HEENT:  Oral care    PULMONARY:  HOB @ 45 degrees, aspiration precaution, NC prednisone 20 daily for 5 days, NIV at night    CARDIOVASCULAR: avoid overload    GI: GI prophylaxis                                          Feeding po    RENAL:  F/u  lytes.  Correct as needed. accurate I/O    INFECTIOUS DISEASE: finish course of abx    HEMATOLOGICAL:  DVT prophylaxis.     ENDOCRINE:  Follow up FS.  Insulin protocol if needed.    poor prognosis    
75 yo F w/ pmhx HTN and COPD on home O2 presents to ED c/o n/v since yesterday. lives alone, but downstairs from sister. son HCP. per EMS was found in vomit and altered, but A&Ox3 in ED on arrival. limited hx as son was not present at the house, arrived to ED to meet pt here afterwards. vomited once in ED- bilious. Denies fever/chill/HA/dizziness/chest pain/palpitation/sob/black stool/bloody stool/urinary sxs. On admission: pt was afebrile, HD stable, on bipap saturating at 100%, WBC 17.60, VB.30/103/33/51. xray unremarkable. Ct abd: Consolidation is noted at the right lung base.     #AHRF likely 2/2 aspiration PNA  #COPD exacerbation   -  On admission: pt was afebrile, HD stable, on bipap saturating at 100%,  - WBC 17.60, VB.30/103/33/51  - xray:  unremarkable  - Ct abd: Consolidation is noted at the right lung base.   - c/w duonebs PRN and standing, Symbicort, spiriva, BiPAP qhs  - Wean oxygen as tolerated, Maintain SPO2 88-92%  - changed from solumedrol to oral prednisone 20mg for 5 day course (start , end )  - c/w unasyn 7 days (started , end date )  - repeat ABG once off BIPAP    #HTN  - c/w home meds    #depression  - c/w paroxetine     #anxiety  - c/w home dose benzo    # suspected folic acid deficiency  -on supplement    #Misc  - DVT Prophylaxis: heparin  - Diet: minced, moist  - GI Prophylaxis: PPI  - Activity: AAT  - Code Status: DNR.    
IMPRESSION:    Acute on chronic hypercapnic resp failure improved  COPD exacerbation  RLL pneumonia/ highly aspiration  altered MS toxic metabolic  Vomiting/ CT AP reviewed      PLAN:    CNS: Avoid CNS depressant    HEENT:  Oral care    PULMONARY:  HOB @ 45 degrees, aspiration precaution, NC prednisone 20 daily for 5 days, NIV at night (  )    CARDIOVASCULAR: avoid overload    GI: GI prophylaxis                                          Feeding po    RENAL:  F/u  lytes.  Correct as needed. accurate I/O    INFECTIOUS DISEASE: finish course of abx    HEMATOLOGICAL:  DVT prophylaxis.     ENDOCRINE:  Follow up FS.  Insulin protocol if needed.    poor prognosis    dc planning

## 2022-05-04 NOTE — PROGRESS NOTE ADULT - NUTRITIONAL ASSESSMENT
This patient has been assessed with a concern for Malnutrition and has been determined to have a diagnosis/diagnoses of Moderate protein-calorie malnutrition and Underweight (BMI < 19).    This patient is being managed with:   Diet Minced and Moist-  Entered: Apr 29 2022 11:23AM    The following pending diet order is being considered for treatment of Moderate protein-calorie malnutrition and Underweight (BMI < 19):  Diet Regular-  Free Water Flush Instructions:  SOFT AND BITE SIZED  entree. STRAWBERRY Ensure. Can have mixed textures and breads.  Supplement Feeding Modality:  Oral  Ensure Enlive Cans or Servings Per Day:  1       Frequency:  Two Times a day  Entered: Apr 30 2022 11:13AM  
This patient has been assessed with a concern for Malnutrition and has been determined to have a diagnosis/diagnoses of Moderate protein-calorie malnutrition and Underweight (BMI < 19).    This patient is being managed with:   Diet Regular-  Free Water Flush Instructions:  SOFT AND BITE SIZED  entree. STRAWBERRY Ensure. Can have mixed textures and breads.  Supplement Feeding Modality:  Oral  Ensure Enlive Cans or Servings Per Day:  1       Frequency:  Two Times a day  Entered: Apr 30 2022 11:13AM    
This patient has been assessed with a concern for Malnutrition and has been determined to have a diagnosis/diagnoses of Moderate protein-calorie malnutrition and Underweight (BMI < 19).    This patient is being managed with:   Diet Regular-  Free Water Flush Instructions:  STRAWBERRY Ensure  Supplement Feeding Modality:  Oral  Ensure Enlive Cans or Servings Per Day:  1       Frequency:  Two Times a day  Entered: May  1 2022 11:10AM    
This patient has been assessed with a concern for Malnutrition and has been determined to have a diagnosis/diagnoses of Moderate protein-calorie malnutrition and Underweight (BMI < 19).    This patient is being managed with:   Diet Regular-  Free Water Flush Instructions:  STRAWBERRY Ensure  Supplement Feeding Modality:  Oral  Ensure Enlive Cans or Servings Per Day:  1       Frequency:  Two Times a day  Entered: May  1 2022 11:10AM    
This patient has been assessed with a concern for Malnutrition and has been determined to have a diagnosis/diagnoses of Moderate protein-calorie malnutrition and Underweight (BMI < 19).    This patient is being managed with:   Diet Regular-  Free Water Flush Instructions:  STRAWBERRY Ensure  Supplement Feeding Modality:  Oral  Ensure Enlive Cans or Servings Per Day:  1       Frequency:  Two Times a day  Entered: May  1 2022 11:10AM    Diet Regular-  Free Water Flush Instructions:  SOFT AND BITE SIZED  entree. STRAWBERRY Ensure. Can have mixed textures and breads.  Supplement Feeding Modality:  Oral  Ensure Enlive Cans or Servings Per Day:  1       Frequency:  Two Times a day  Entered: Apr 30 2022 11:13AM    The following pending diet order is being considered for treatment of Moderate protein-calorie malnutrition and Underweight (BMI < 19):null
This patient has been assessed with a concern for Malnutrition and has been determined to have a diagnosis/diagnoses of Moderate protein-calorie malnutrition and Underweight (BMI < 19).    This patient is being managed with:   Diet Regular-  Free Water Flush Instructions:  SOFT AND BITE SIZED  entree. STRAWBERRY Ensure. Can have mixed textures and breads.  Supplement Feeding Modality:  Oral  Ensure Enlive Cans or Servings Per Day:  1       Frequency:  Two Times a day  Entered: Apr 30 2022 11:13AM    
This patient has been assessed with a concern for Malnutrition and has been determined to have a diagnosis/diagnoses of Moderate protein-calorie malnutrition and Underweight (BMI < 19).    This patient is being managed with:   Diet Regular-  Free Water Flush Instructions:  STRAWBERRY Ensure  Supplement Feeding Modality:  Oral  Ensure Enlive Cans or Servings Per Day:  1       Frequency:  Two Times a day  Entered: May  1 2022 11:10AM    
This patient has been assessed with a concern for Malnutrition and has been determined to have a diagnosis/diagnoses of Moderate protein-calorie malnutrition and Underweight (BMI < 19).    This patient is being managed with:   Diet Regular-  Free Water Flush Instructions:  STRAWBERRY Ensure  Supplement Feeding Modality:  Oral  Ensure Enlive Cans or Servings Per Day:  1       Frequency:  Two Times a day  Entered: May  1 2022 11:10AM

## 2022-05-23 NOTE — ED ADULT NURSE NOTE - OBJECTIVE STATEMENT
75 y/o female BIBA for fall. Patient was found on the floor by friend this morning. Patient tripped and fell last night and couldn't get up. Patient denies any pain on arrival.

## 2022-05-23 NOTE — ED ADULT NURSE NOTE - CHIEF COMPLAINT QUOTE
patient brought in by ambulance for fall last night laying on floor for few hours complains of lower back pain able to move extremities c collar in place unable to recall event no AC

## 2022-05-23 NOTE — ED PROVIDER NOTE - NSFOLLOWUPINSTRUCTIONS_ED_ALL_ED_FT
Fall Prevention in the Home    Falls can cause injuries. They can happen to people of all ages. There are many things you can do to make your home safe and to help prevent falls.     WHAT CAN I DO ON THE OUTSIDE OF MY HOME?  Regularly fix the edges of walkways and driveways and fix any cracks.  Remove anything that might make you trip as you walk through a door, such as a raised step or threshold.  Trim any bushes or trees on the path to your home.  Use bright outdoor lighting.  Clear any walking paths of anything that might make someone trip, such as rocks or tools.  Regularly check to see if handrails are loose or broken. Make sure that both sides of any steps have handrails.  Any raised decks and porches should have guardrails on the edges.  Have any leaves, snow, or ice cleared regularly.  Use sand or salt on walking paths during winter.  Clean up any spills in your garage right away. This includes oil or grease spills.    WHAT CAN I DO IN THE BATHROOM?  Use night lights.  Install grab bars by the toilet and in the tub and shower. Do not use towel bars as grab bars.  Use non-skid mats or decals in the tub or shower.  If you need to sit down in the shower, use a plastic, non-slip stool.  Keep the floor dry. Clean up any water that spills on the floor as soon as it happens.  Remove soap buildup in the tub or shower regularly.  Attach bath mats securely with double-sided non-slip rug tape.  Do not have throw rugs and other things on the floor that can make you trip.    WHAT CAN I DO IN THE BEDROOM?  Use night lights.  Make sure that you have a light by your bed that is easy to reach.  Do not use any sheets or blankets that are too big for your bed. They should not hang down onto the floor.  Have a firm chair that has side arms. You can use this for support while you get dressed.  Do not have throw rugs and other things on the floor that can make you trip.    WHAT CAN I DO IN THE KITCHEN?  Clean up any spills right away.  Avoid walking on wet floors.  Keep items that you use a lot in easy-to-reach places.  If you need to reach something above you, use a strong step stool that has a grab bar.  Keep electrical cords out of the way.  Do not use floor polish or wax that makes floors slippery. If you must use wax, use non-skid floor wax.  Do not have throw rugs and other things on the floor that can make you trip.    WHAT CAN I DO WITH MY STAIRS?  Do not leave any items on the stairs.  Make sure that there are handrails on both sides of the stairs and use them. Fix handrails that are broken or loose. Make sure that handrails are as long as the stairways.  Check any carpeting to make sure that it is firmly attached to the stairs. Fix any carpet that is loose or worn.  Avoid having throw rugs at the top or bottom of the stairs. If you do have throw rugs, attach them to the floor with carpet tape.  Make sure that you have a light switch at the top of the stairs and the bottom of the stairs. If you do not have them, ask someone to add them for you.    WHAT ELSE CAN I DO TO HELP PREVENT FALLS?  Wear shoes that:  Do not have high heels.  Have rubber bottoms.  Are comfortable and fit you well.  Are closed at the toe. Do not wear sandals.  If you use a stepladder:  Make sure that it is fully opened. Do not climb a closed stepladder.  Make sure that both sides of the stepladder are locked into place.  Ask someone to hold it for you, if possible.  Clearly hvaen and make sure that you can see:  Any grab bars or handrails.  First and last steps.  Where the edge of each step is.  Use tools that help you move around (mobility aids) if they are needed. These include:  Canes.  Walkers.  Scooters.  Crutches.  Turn on the lights when you go into a dark area. Replace any light bulbs as soon as they burn out.  Set up your furniture so you have a clear path. Avoid moving your furniture around.  If any of your floors are uneven, fix them.  If there are any pets around you, be aware of where they are.  Review your medicines with your doctor. Some medicines can make you feel dizzy. This can increase your chance of falling.    Ask your doctor what other things that you can do to help prevent falls.    ADDITIONAL NOTES AND INSTRUCTIONS    Please follow up with your Primary MD in 24-48 hr.  Seek immediate medical care for any new/worsening signs or symptoms.

## 2022-05-23 NOTE — ED PROVIDER NOTE - PHYSICAL EXAMINATION
CONSTITUTIONAL: In no apparent distress.   HEAD: Normocephalic; atraumatic.   EYES: Pupils are round and reactive, extra-ocular muscles are intact. Eyelids are normal in appearance without swelling or lesions.   ENT: External ears are non-tender and without swelling or erythema. Hearing is intact with good acuity to spoken voice. Patient is speaking clearly, not muffled and airway is intact.   NECK: No midline tenderness  RESPIRATORY: No signs of respiratory distress. Lung sounds are clear in all lobes bilaterally without rales, rhonchi, or wheezes.  CARDIOVASCULAR: Regular rate and rhythm.   GI: Abdomen is soft, non-tender, and without distention. Bowel sounds are present and normoactive in all four quadrants. No masses are noted.   BACK: No evidence of trauma or deformity. No midline tenderness. Normal ROM.   PELVIS: No evidence of trauma or deformity. No tenderness to palpation.  EXT: Normal appearance and ROM in all four extremities. No tenderness to palpation and distal pulses are normal. Sensation to the upper and lower extremities is normal bilaterally.   NEURO: A & O x 3. Normal speech.   PSYCHOLOGICAL: Appropriate mood and affect. Good judgement and insight.

## 2022-05-23 NOTE — ED PROVIDER NOTE - PATIENT PORTAL LINK FT
You can access the FollowMyHealth Patient Portal offered by [facility] by registering at the following website: [email or web address]. By joining [employer]’s FollowMyHealth portal, you will also be able to view your health information using other applications (apps) compatible with our system.

## 2022-05-23 NOTE — ED PROVIDER NOTE - ATTENDING CONTRIBUTION TO CARE
76-year-old female to ED status post fall.  Found by her aide on the grams that she had fallen the ground and felt too weak to get up so fell asleep there.  No LOC no nausea vomiting otherwise well-appearing and nontoxic.  No complaints in the ED now patient does use 2 L nasal cannula at home for her COPD.  Denies any syncope states she had tripped on her oxygen tubing and fell.

## 2022-05-23 NOTE — ED CLERICAL - NS ED CLERK NOTE PRE-ARRIVAL INFORMATION; ADDITIONAL PRE-ARRIVAL INFORMATION
This patient is enrolled in the [facility] readmission reduction initiative and has active care navigation. This patient can be followed up by the care navigation team within 24 hours.  To arrange close follow-up or to obtain additional clinical information, please call the contact number above. The on call [facility] Hospitalist has been notified and will coordinate care in concert with the ED Physician including consults as necessary. Call [phone] (North), [phone] (South) to reach the hospitalist. You may also call the Hospitalist Division at [phone] at either site. Consider CDU for management per guidelines

## 2022-05-23 NOTE — ED ADULT NURSE NOTE - NSIMPLEMENTINTERV_GEN_ALL_ED
Implemented All Fall with Harm Risk Interventions:  [place] to call system. Call bell, personal items and telephone within reach. Instruct patient to call for assistance. Room bathroom lighting operational. Non-slip footwear when patient is off stretcher. Physically safe environment: no spills, clutter or unnecessary equipment. Stretcher in lowest position, wheels locked, appropriate side rails in place. Provide visual cue, wrist band, yellow gown, etc. Monitor gait and stability. Monitor for mental status changes and reorient to person, place, and time. Review medications for side effects contributing to fall risk. Reinforce activity limits and safety measures with patient and family. Provide visual clues: red socks.

## 2022-05-23 NOTE — ED ADULT NURSE NOTE - BREATHING, MLM
Suspect in the setting of intra-abdominal infection  Slowly improving  Monitor closely Spontaneous, unlabored and symmetrical

## 2022-05-23 NOTE — ED PROVIDER NOTE - OBJECTIVE STATEMENT
76-year-old female with history of COPD and hypertension who presents after a fall.  Reports that she went to bed last night and woke up this morning on the floor.  Reports that she felt too weak to move so she was on the floor into her friend who came in this morning to check in on her.  Denies any pain on her body currently.  Denies anticoagulant use.  Reports that she uses 2 L nasal cannula oxygen at home for her COPD.  Denies fever, shortness of breath, chest pain, nausea, vomiting, abdominal pain, urinary symptoms, and change in bowel movement.

## 2022-05-23 NOTE — ED PROVIDER NOTE - NS ED ROS FT
Constitutional: Negative for fever, chills, and fatigue.  HENT: Negative for headache,   Eyes: Negative for eye pain, and vision change.  Cardiovascular: Negative for chest pain, and palpitation.  Respiratory: Negative for SOB, wheezing, cough and sputum production.  Gastrointestinal: Negative for nausea, vomiting, abdominal pain  Genitourinary: Negative for flank pain, dysuria, frequency, and hematuria.  Neurological: Negative for dizziness, syncope, focal weakness, numbness,  Musculoskeletal: Negative for joint swelling, arthralgias, back pain, neck pain, and calf cramps.  Hematological: Does not bruise/bleed easily.

## 2022-05-23 NOTE — ED PROVIDER NOTE - CARE PROVIDER_API CALL
Demarcus Franks (MD)  Internal Medicine  [address] [place] [address]  Phone: [phone]  Fax: [phone]  Established Patient  Follow Up Time: Urgent

## 2022-05-23 NOTE — ED PROVIDER NOTE - PROGRESS NOTE DETAILS
TA: patient made aware of CT findings; patient able to ambulate in ED. called Odessa Blackman ([facility] pt) who is aware and will coordinate care.

## 2022-05-23 NOTE — ED ADULT NURSE NOTE - NS ED NOTE ABUSE SUSPICION NEGLECT YN
Well Care - Tips for Teens: Care Instructions  Your Care Instructions     Being a teen can be exciting and tough. You are finding your place in the world. And you may have a lot on your mind these days tooschool, friends, sports, parents, and maybe even how you look. Some teens begin to feel the effects of stress, such as headaches, neck or back pain, or an upset stomach. To feel your best, it is important to start good health habits now. Follow-up care is a key part of your treatment and safety. Be sure to make and go to all appointments, and call your doctor if you are having problems. It's also a good idea to know your test results and keep a list of the medicines you take. How can you care for yourself at home? Staying healthy can help you cope with stress or depression. Here are some tips to keep you healthy. · Get at least 30 minutes of exercise on most days of the week. Walking is a good choice. You also may want to do other activities, such as running, swimming, cycling, or playing tennis or team sports. · Try cutting back on time spent on TV or video games each day. · Munch at least 5 helpings of fruits and veggies. A helping is a piece of fruit or ½ cup of vegetables. · Cut back to 1 can or small cup of soda or juice drink a day. Try water and milk instead. · Cheese, yogurt, milkhave at least 3 cups a day to get the calcium you need. · The decision to have sex is a serious one that only you can make. Not having sex is the best way to prevent HIV, STIs (sexually transmitted infections), and pregnancy. · If you do choose to have sex, condoms and birth control can increase your chances of protection against STIs and pregnancy. · Talk to an adult you feel comfortable with. Confide in this person and ask for his or her advice. This can be a parent, a teacher, a , or someone else you trust.  Healthy ways to deal with stress   · Get 9 to 10 hours of sleep every night.   · Eat healthy meals.  · Go for a long walk. · Dance. Shoot hoops. Go for a bike ride. Get some exercise. · Talk with someone you trust.  · Laugh, cry, sing, or write in a journal.  When should you call for help? Call 911 anytime you think you may need emergency care. For example, call if:    · You feel life is meaningless or think about killing yourself. Talk to a counselor or doctor if any of the following problems lasts for 2 or more weeks.    · You feel sad a lot or cry all the time.     · You have trouble sleeping or sleep too much.     · You find it hard to concentrate, make decisions, or remember things.     · You change how you normally eat.     · You feel guilty for no reason. Where can you learn more? Go to https://[employer]. org and sign in to your [facility] account. Enter Y740 in the [employer] box to learn more about \"Well Care - Tips for Teens: Care Instructions. \"     If you do not have an account, please click on the \"Sign Up Now\" link. Current as of: February 10, 2021               Content Version: 13.0  © 5538-9753 [employer], [facility]. Care instructions adapted under license by [facility] ([facility]). If you have questions about a medical condition or this instruction, always ask your healthcare professional. [address] any warranty or liability for your use of this information. No

## 2022-05-23 NOTE — ED PROVIDER NOTE - CLINICAL SUMMARY MEDICAL DECISION MAKING FREE TEXT BOX
CT pan scan was done with no traumatic injury.  Family members in ED at patient bedside.  Had a long discussion with family and the patient and patient is strongly prefers to be home.  Explained risks alternatives and benefits and she agrees.      Incidental radiographic findings were discussed with the patient and a copy of the findings were given to the patient. The patient was advised to follow up as an outpatient for further evaluation and management of these findings. The patient verbalized that they understand these instructions.

## 2022-05-25 PROBLEM — J44.9 CHRONIC OBSTRUCTIVE PULMONARY DISEASE, UNSPECIFIED COPD TYPE: Status: ACTIVE | Noted: 2020-10-28

## 2022-05-25 PROBLEM — J18.9 PNEUMONIA: Status: ACTIVE | Noted: 2022-01-01

## 2022-05-25 PROBLEM — I10 HYPERTENSION, UNSPECIFIED TYPE: Status: ACTIVE | Noted: 2020-10-29

## 2022-05-25 NOTE — ASSESSMENT
[FreeTextEntry1] : Patient is a 77 yo F enrolled in the [facility] TCM program sp a discharge from [facility] for PNA/ COPD she has a PMH of HTN and COPD on home O2 patient has completed abrx regimen and steroids, she re presented to the Ed for a mechanical fall and was dc home with TCM followup. Patient observed via telehealth alert in NAD denies c/p, sob, cough , fever, NV and dizziness. HHA daily , contacted PCP house call follow up 6/1.

## 2022-05-25 NOTE — PLAN
[FreeTextEntry1] : COPD- 02, c/w Symbicort , Incruse, albuterol prn and pulmonary follow up\par PNA- ABRX completed\par Adhere to all medications including demonstrating proper use of nebulizers.\par Increase activity as tolerated and maintain optimal activity levels. Continue coughing and deep breathing exercises including use of Incentive Spirometry.\par Receive routine pneumococcal and influenza vaccinations.\par Maintain proper nutrition and adequate hydration.\par Notify NP for worsening symptoms including fever, chills, SOB, CP, increased cough and secretions.\par HTN- c/w current meds\par Follow up with MD within 7 days of discharge.\par \par

## 2022-05-25 NOTE — COUNSELING
[de-identified] : Pt was informed about CN’s role/ STARS program and overview of transitional care reviewed with patient. Pt educated on topics of importance such as compliance with prescribed medication regimen, COPD action plan and escalation process, adequate hydration, and proper diet. Pt encouraged calling CN with any issues, concerns or questions, also educated to notify CN if experiencing CP, SOB, cough, increased mucus production, increased use of rescue inhaler, fever, chills, fatigue, “chest cold symptoms” dizziness, lightheadedness, n/v/d/c, swelling to extremities and/or any signs of COPD exacerbation/flare as reviewed. Reassurance provided. Will continue to monitor\par \par

## 2022-05-25 NOTE — PHYSICAL EXAM
[No Acute Distress] : no acute distress [No Respiratory Distress] : no respiratory distress  [No Accessory Muscle Use] : no accessory muscle use [Non-distended] : non-distended [No Focal Deficits] : no focal deficits [Normal Affect] : the affect was normal [Alert and Oriented x3] : oriented to person, place, and time [Normal Insight/Judgement] : insight and judgment were intact [de-identified] : uses walker

## 2022-05-25 NOTE — HISTORY OF PRESENT ILLNESS
[Home] : at home, [unfilled] , at the time of the visit. [Other Location: e.g. Home (Enter Location, City,State)___] : at [unfilled] [Friend] : friend [Verbal consent obtained from patient] : the patient, [unfilled] [FreeTextEntry1] : follow up hospital discharge / copd [de-identified] : Patient is a 77 yo F enrolled in the [facility] TCM program sp a discharge from [facility] for PNA/ COPD she has a PMH of HTN and COPD on home O2 patient has completed abrx regimen and steroids, she re presented to the Ed for a mechanical fall and was dc home with TCM followup. Patient observed via telehealth alert in NAD denies c/p, sob, cough , fever, NV and dizziness. HHA daily , contacted PCP house call follow up 6/1.

## 2022-05-29 NOTE — ED PROVIDER NOTE - PHYSICAL EXAMINATION
Vital Signs: Reviewed  GEN: arousable to voice, mumbling words  HEAD:  normocephalic, atraumatic  EYES:  PERRLA; conjunctivae without injection, drainage or discharge  ENMT:  nasal mucosa moist; mouth moist without ulcerations or lesions; throat moist without erythema, exudate, ulcerations or lesions  NECK:  supple  CARDIAC:  regular rate, normal S1 and S2, no murmurs  RESP:  tachypneic, coarse breath sounds  ABDOMEN:  soft, nontender, nondistended  MUSCULOSKELETAL/NEURO:  moves all extremities  SKIN:  normal skin color for age and race, well-perfused; warm and dry

## 2022-05-29 NOTE — ED PROVIDER NOTE - WR ORDER DATE AND TIME 1
29-May-2022 12:30 Xeljanz Counseling: I discussed with the patient the risks of Xeljanz therapy including increased risk of infection, liver issues, headache, diarrhea, or cold symptoms. Live vaccines should be avoided. They were instructed to call if they have any problems.

## 2022-05-29 NOTE — ED PROVIDER NOTE - PROGRESS NOTE DETAILS
AG: pt initially not a candidate for BPAP due to mental status; since being in ED mental status has improved somewhat, repeating VBG. vbg with persistent hypercapnea, will place on bipap now The patient is with persistent hypercapnia on VBG.  Discussed with pulmonary fellow we will switch to AVAPS settings changed.  We will repeat gas in approxi-1 hour. Patient clinically looks improved since arrival at ED has been treated for suspected aspiration pneumonia

## 2022-05-29 NOTE — ED PROVIDER NOTE - ATTENDING CONTRIBUTION TO CARE
76-year-old female history of COPD hypertension brought in by EMS for evaluation of lethargy.  EMS told us that the neighbor went over and found the patient with depressed mental status vomit around her had an empty bottle of Xanax.  Patient reported history of aspiration ammonia here on initial eval patient with mumbling words arousable speech tachypneic S1-S2 coarse breath sounds bilaterally soft nontender moving all extremities  Impression  vomiting, diff breathing, possible aspiration pneumonia initially,  Patient initially febrile given Tylenol labs showing white count of 13 initial ABG with a pH of 7.22 and a bicarb of 128.  Patient became more alert with help with a repeat gas with improve however repeat gas showed a pH of 7.29 the CO2 of on Jocelyn 3.  Patient was placed on BiPAP and approximate 1 hour later repeat gas showed similar values.  Case discussed with pulmonary patient was placed on AVAPS with resulting improvement of the VBG.  VBG demonstrated a pH of 7.34 CO2 of 85.  Patient is stable admitted to the stepdown unit with suspected aspiration pneumonia patient clinically is improving mental status is improving lastly I discussed the case with the son at bedside who states there was no missing Xanax pills did not feel that was a contributing factor to the patient's symptoms.

## 2022-05-29 NOTE — ED PROVIDER NOTE - CLINICAL SUMMARY MEDICAL DECISION MAKING FREE TEXT BOX
vomiting, diff breathing, possible aspiration pneumonia initially,  Patient initially febrile given Tylenol labs showing white count of 13 initial ABG with a pH of 7.22 and a bicarb of 128.  Patient became more alert with help with a repeat gas with improve however repeat gas showed a pH of 7.29 the CO2 of on Jocelyn 3.  Patient was placed on BiPAP and approximate 1 hour later repeat gas showed similar values.  Case discussed with pulmonary patient was placed on AVAPS with resulting improvement of the VBG.  VBG demonstrated a pH of 7.34 CO2 of 85.  Patient is stable admitted to the stepdown unit with suspected aspiration pneumonia patient clinically is improving mental status is improving lastly I discussed the case with the son at bedside who states there was no missing Xanax pills did not feel that was a contributing factor to the patient's symptoms.

## 2022-05-29 NOTE — ED ADULT TRIAGE NOTE - CHIEF COMPLAINT QUOTE
patient found to be altered by neighbor. patient noted to have empty bottle of xanax in house . hx of aspiration. patient tachypneic hypoxic tachy cardic and disoriented denies xanax use

## 2022-05-29 NOTE — ED CLERICAL - NS ED CLERK NOTE PRE-ARRIVAL INFORMATION; ADDITIONAL PRE-ARRIVAL INFORMATION
This patient is enrolled in the [facility] readmission reduction program and has active care navigation. This patient can be followed up by the care navigation team within 24 hours. To arrange close follow-up or to obtain additional clinical information about this patient, please call the contact number above.

## 2022-05-29 NOTE — ED PROVIDER NOTE - OBJECTIVE STATEMENT
76yF pmhx COPD, HTN BIBEMS after being found by neighbor w/ depressed mental status; constant, stable; per EMS, neighbor came over for well check and found pt very sleepy next to an open/empty bottle of xanax; pt had vomit around mouth/clothes and has a hx of aspiration PNA. Graft Cartilage Fenestration Text: The cartilage was fenestrated with a 2mm punch biopsy to help facilitate graft survival and healing.

## 2022-05-29 NOTE — ED PROVIDER NOTE - EKG #1 DATE/TIME
29-May-2022 13:42 [place] to call system/Review medications for side effects contributing to fall risk/Reinforce activity limits and safety measures with patient and family

## 2022-05-29 NOTE — H&P ADULT - NSHPPHYSICALEXAM_GEN_ALL_CORE
GENERAL: NAD, speaks in full sentences,  HEAD:  Atraumatic, Normocephalic  EYES: EOMI, PERRLA  NECK: Supple  CHEST/LUNG: Clear to auscultation bilaterally; No wheeze  HEART: Regular rate and rhythm; No murmurs;   ABDOMEN: Soft, Nontender, Nondistended; Bowel sounds present; No guarding  EXTREMITIES:  No cyanosis or edema  PSYCH: AAOx2  NEUROLOGY: non-focal  SKIN: No rashes or lesions

## 2022-05-29 NOTE — H&P ADULT - ATTENDING COMMENTS
HPI:  76-year-old female history of COPD hypertension brought in by EMS for evaluation of lethargy.  EMS told us that the neighbor went over and found the patient with sluggish mental status with vomit around her mouth and had an empty bottle of Xanax next to her. Patient unable to answer if she took too much xanax intentionally or not. Patient is a poor historian. Of note, Patient reported history of aspiration ammonia and was recently admitted for aspiration PNA. Case was discussed with son who reports that there are no missing xanax pills and did not feel that was a contributing factor to the patient's symptoms.    In the ED, CXR is negative for acute process. Patient initially febrile with white count of 13 initial ABG with a pH of 7.22 and a bicarb of 128.  Patient became more alert with help with a repeat gas with improve however repeat gas showed a pH of 7.29 the CO2 of on Jocelyn 3.  Patient was placed on BiPAP and approximate 1 hour later repeat gas showed similar values.  Case discussed with pulmonary patient was placed on AVAPS with resulting improvement of the VBG.  VBG demonstrated a pH of 7.34 CO2 of 85.  Patient is stable admitted to the stepdown unit with suspected aspiration pneumonia patient clinically is improving mental status is improving. She is given cefepime and clindamycin in ED.     Vital Signs Last 24 Hrs  T(C): 37.9 (29 May 2022 15:21), Max: 38.3 (29 May 2022 12:30)  T(F): 100.2 (29 May 2022 15:21), Max: 101 (29 May 2022 12:30)  HR: 100 (29 May 2022 21:07) (90 - 114)  BP: 134/68 (29 May 2022 21:07) (129/68 - 148/72)  BP(mean): --  RR: 20 (29 May 2022 21:07) (20 - 26)  SpO2: 98% (29 May 2022 21:07) (98% - 99%) (29 May 2022 21:33)    REVIEW OF SYSTEMS: see cc/HPI   CONSTITUTIONAL: No weakness, fevers or chills  EYES/ENT: No visual changes;  No vertigo or throat pain   NECK: No pain or stiffness  RESPIRATORY: No cough, wheezing, hemoptysis; No shortness of breath  CARDIOVASCULAR: No chest pain or palpitations  GASTROINTESTINAL: No abdominal or epigastric pain. No nausea, vomiting, or hematemesis; No diarrhea or constipation. No melena or hematochezia.  GENITOURINARY: No dysuria, frequency or hematuria  NEUROLOGICAL: No numbness or weakness  SKIN: No itching, rashes    Physical Exam:   General: WN/WD , on BiPAP   Neurology: A&Ox3, nonfocal, follows commands  Eyes: PERRLA/ EOMI  ENT/Neck: Neck supple, trachea midline, No JVD  Respiratory: B/L wheezing and decreased breath sounds,  rales, rhonchi  CV: Normal rate regular rhythm, S1S2, (+) murmurs, rubs or gallops  Abdominal: Soft, NT, ND +BS,   Extremities: No edema, + peripheral pulses  Skin: No Rashes, Hematoma, Ecchymosis  Incisions:   Tubes: Lo cath in place     A/p  Acute on chronic resp failure w/ hypercapnea - multifactorial   suspected aspiration / gram negative pneumonia   COPD exacerbation   Possible overdose   -Admit to stepdown  -c/w BiPAP   -IV Abx   -check sputum / blood and urine culture   -ABG in AM and pulse ox monitoring   -Pulm eval and c/w outpatient Rx  -avoid oversedation     HTN -stable   -c/w outpatient Rx    Depression / anxiety -found down w/ an empty bottle of Xanax next to her   -Psychiatry eval     DVT prophylaxis HPI:  76-year-old female history of COPD hypertension brought in by EMS for evaluation of lethargy.  EMS told us that the neighbor went over and found the patient with sluggish mental status with vomit around her mouth and had an empty bottle of Xanax next to her. Patient unable to answer if she took too much xanax intentionally or not. Patient is a poor historian. Of note, Patient reported history of aspiration ammonia and was recently admitted for aspiration PNA. Case was discussed with son who reports that there are no missing xanax pills and did not feel that was a contributing factor to the patient's symptoms.    In the ED, CXR is negative for acute process. Patient initially febrile with white count of 13 initial ABG with a pH of 7.22 and a bicarb of 128.  Patient became more alert with help with a repeat gas with improve however repeat gas showed a pH of 7.29 the CO2 of on Jocelyn 3.  Patient was placed on BiPAP and approximate 1 hour later repeat gas showed similar values.  Case discussed with pulmonary patient was placed on AVAPS with resulting improvement of the VBG.  VBG demonstrated a pH of 7.34 CO2 of 85.  Patient is stable admitted to the stepdown unit with suspected aspiration pneumonia patient clinically is improving mental status is improving. She is given cefepime and clindamycin in ED.     Vital Signs Last 24 Hrs  T(C): 37.9 (29 May 2022 15:21), Max: 38.3 (29 May 2022 12:30)  T(F): 100.2 (29 May 2022 15:21), Max: 101 (29 May 2022 12:30)  HR: 100 (29 May 2022 21:07) (90 - 114)  BP: 134/68 (29 May 2022 21:07) (129/68 - 148/72)  BP(mean): --  RR: 20 (29 May 2022 21:07) (20 - 26)  SpO2: 98% (29 May 2022 21:07) (98% - 99%) (29 May 2022 21:33)    REVIEW OF SYSTEMS: see cc/HPI   CONSTITUTIONAL: No weakness, fevers or chills  EYES/ENT: No visual changes;  No vertigo or throat pain   NECK: No pain or stiffness  RESPIRATORY: No cough, wheezing, hemoptysis; No shortness of breath  CARDIOVASCULAR: No chest pain or palpitations  GASTROINTESTINAL: No abdominal or epigastric pain. No nausea, vomiting, or hematemesis; No diarrhea or constipation. No melena or hematochezia.  GENITOURINARY: No dysuria, frequency or hematuria  NEUROLOGICAL: No numbness or weakness  SKIN: No itching, rashes    Physical Exam:   General: WN/WD , on BiPAP   Neurology: A&Ox3, nonfocal, follows commands  Eyes: PERRLA/ EOMI  ENT/Neck: Neck supple, trachea midline, No JVD  Respiratory: B/L wheezing and decreased breath sounds,  rales, rhonchi  CV: Normal rate regular rhythm, S1S2, (+) murmurs, rubs or gallops  Abdominal: Soft, NT, ND +BS,   Extremities: No edema, + peripheral pulses  Skin: No Rashes, Hematoma, Ecchymosis  Incisions:   Tubes: Lo cath in place     A/p  Acute on chronic resp failure w/ hypercapnea - multifactorial   suspected aspiration / gram negative pneumonia   COPD exacerbation   Possible overdose   -Admit to stepdown  -c/w BiPAP   -IV Abx   -check sputum / blood and urine culture   -ABG in AM and pulse ox monitoring   -Pulm eval and c/w outpatient Rx  -avoid oversedation     HTN -stable   -c/w outpatient Rx    Depression / anxiety -found down w/ an empty bottle of Xanax next to her   -Psychiatry eval     DVT prophylaxis      PATIENT SEEN by ATTENDING 5/29/22 ( note revised 5/30)

## 2022-05-29 NOTE — H&P ADULT - ASSESSMENT
77 yo F w/ pmhx HTN and COPD on home O2 presents to ED c/o n/v since yesterday. lives alone, but downstairs from sister. son HCP. per EMS was found in vomit and altered, but A&Ox3 in ED on arrival. limited hx as son was not present at the house, arrived to ED to meet pt here afterwards. vomited once in ED- bilious. Denies fever/chill/HA/dizziness/chest pain/palpitation/sob/black stool/bloody stool/urinary sxs. On admission: pt was afebrile, HD stable, on bipap saturating at 100%, WBC 17.60, VB.30/103/33/51. xray unremarkable. Ct abd: Consolidation is noted at the right lung base.       #AHRF likely 2/2 aspiration PNA  -  On admission: pt was afebrile, HD stable, on bipap saturating at 100%,  - WBC 17.60, VB.30/103/33/51  - xray unremarkable  - Ct abd: Consolidation is noted at the right lung base.   - started on unasyn  - aspiration precaution   - npo while on bipap, continue bipap overnight, wean tomorrow  - repeat ABG   - Monitor pulse, keep 88-92%  - c/w duonebs PRN and standing, Symbicort, spiriva      #HTN  - c/w home meds    #depression  - c/w paroxetine     #anxiety  - c/w home dose benzo    #Misc  - DVT Prophylaxis: heparin  - Diet: NPO for now  - GI Prophylaxis: PPI  - Activity: AAT  - IV Fluids: n/a  - Code Status: full code     77 yo F w/ pmhx HTN and COPD on home O2 presents to ED c/o n/v since yesterday. lives alone, but downstairs from sister. son HCP. per EMS was found in vomit and altered,     #AHRF on chronic RF likely 2/2 aspiration PNA  - On admission: pt was febrile, tachy, with elevated WBC  - Sepsis on admission  - Cxr unremarkable  - s/p cefepime and clindamycin  - start unasyn  - ID consulted  - f/u bld clx,  - sputum clx, mrsa pcr, legionella and strep ordered  - aspiration precaution   - npo while on bipap, continue bipap alt with NC and wean as patrica  - repeat ABG am  - Monitor pulse, keep 88-92%  - c/w duonebs PRN and standing, Symbicort, spiriva  - admit stepdown     #HTN  - c/w home meds    #depression  - c/w paroxetine     #anxiety  - hold xanax due to mental status    #Misc  - DVT Prophylaxis: heparin  - Diet: NPO for now  - GI Prophylaxis: PPI  - Code Status: DNR/DNI, confirmed with patient. Has molst from previous admission

## 2022-05-29 NOTE — ED PROVIDER NOTE - CARE PLAN
1 Principal Discharge DX:	Acute respiratory failure with hypercapnia  Secondary Diagnosis:	Somnolence

## 2022-05-29 NOTE — H&P ADULT - HISTORY OF PRESENT ILLNESS
76-year-old female history of COPD hypertension brought in by EMS for evaluation of lethargy.  EMS told us that the neighbor went over and found the patient with depressed mental status vomit around her had an empty bottle of Xanax.  Patient reported history of aspiration ammonia here on initial eval patient with mumbling words arousable speech tachypneic S1-S2 coarse breath sounds bilaterally soft nontender moving all extremities  Impression  vomiting, diff breathing, possible aspiration pneumonia initially,  Patient initially febrile given Tylenol labs showing white count of 13 initial ABG with a pH of 7.22 and a bicarb of 128.  Patient became more alert with help with a repeat gas with improve however repeat gas showed a pH of 7.29 the CO2 of on Jocelyn 3.  Patient was placed on BiPAP and approximate 1 hour later repeat gas showed similar values.  Case discussed with pulmonary patient was placed on AVAPS with resulting improvement of the VBG.  VBG demonstrated a pH of 7.34 CO2 of 85.  Patient is stable admitted to the stepdown unit with suspected aspiration pneumonia patient clinically is improving mental status is improving lastly I discussed the case with the son at bedside who states there was no missing Xanax pills did not feel that was a contributing factor to the patient's symptoms. 76-year-old female history of COPD hypertension brought in by EMS for evaluation of lethargy.  EMS told us that the neighbor went over and found the patient with sluggish mental status with vomit around her mouth and had an empty bottle of Xanax next to her. Patient unable to answer if she took too much xanax intentionally or not. Patient is a poor historian. Of note, Patient reported history of aspiration ammonia and was recently admitted for aspiration PNA. Case was discussed with son who reports that there are no missing xanax pills and did not feel that was a contributing factor to the patient's symptoms.    In the ED, CXR is negative for acute process. Patient initially febrile with white count of 13 initial ABG with a pH of 7.22 and a bicarb of 128.  Patient became more alert with help with a repeat gas with improve however repeat gas showed a pH of 7.29 the CO2 of on Jocelyn 3.  Patient was placed on BiPAP and approximate 1 hour later repeat gas showed similar values.  Case discussed with pulmonary patient was placed on AVAPS with resulting improvement of the VBG.  VBG demonstrated a pH of 7.34 CO2 of 85.  Patient is stable admitted to the stepdown unit with suspected aspiration pneumonia patient clinically is improving mental status is improving. She is given cefepime and clindamycin in ED.     Vital Signs Last 24 Hrs  T(C): 37.9 (29 May 2022 15:21), Max: 38.3 (29 May 2022 12:30)  T(F): 100.2 (29 May 2022 15:21), Max: 101 (29 May 2022 12:30)  HR: 100 (29 May 2022 21:07) (90 - 114)  BP: 134/68 (29 May 2022 21:07) (129/68 - 148/72)  BP(mean): --  RR: 20 (29 May 2022 21:07) (20 - 26)  SpO2: 98% (29 May 2022 21:07) (98% - 99%)

## 2022-05-30 NOTE — CONSULT NOTE ADULT - CRITICAL CARE ATTENDING COMMENT
I have personally seen and examined this patient.  I have reviewed all pertinent clinical information and reviewed all relevant imaging and diagnostic studies personally.   If possible, I counseled the patient about diagnostic testing and treatment plan.   I discussed my recommendations with the primary team.

## 2022-05-30 NOTE — CHART NOTE - NSCHARTNOTEFT_GEN_A_CORE
Attempted to see patient at bedside, however, patient was noted to be in significant respiratory distress and unable to engage in interview.       Spoke to Son Shady MARTÍNEZ and his wife [phone] who reports that he is not exactly sure what happened. He reports that be believes that the patient aspirated in her sleep as she had done a few weeks ago. He reports that the patient lost her soon about a year ago and that since that time she had been feeling somewhat "down about life and not enjoying things" and reporting concurrent poor appetite. Son reports that every now and then she will feel down and start ruminating about her  son, call him (shady) to speak about the  son and make depressed statements such as "if I die tomorrow then that is okay" and "goodbye". Shady reports that night prior to presentation he had seen his mother and she appeared at her baseline. He reports that @ 11pm the patient called to say "I want to say goodbye" as she had done numerous times before, but, given her pattern of behavior, he talked her down and dismissed the called. He reports that in the morning, the aid (who works daily) and the neightbor (who visits regularly) found patient to be altered and with vomit on the bed. Shady reports that while there was a Rx bottle near her, the bottle had 45 pills of xanax left. Shady reports that it the same bottle had 72 pills left 3 weeks ago and reports that now the number of pills left had been counted by family and the aid. He reports that the presentation is similar to that of 3 weeks ago where she was also found in bed with vomit around her and dx with aspiration PNA. He reports that he does not know if combining zantac, melatonin and xanax caused this but he reports that he does not think that the patient would take their life, stating "I don't think she would do that. She had a young grandkid who she loves and sees often". He also added that the patient had no hx of previous ipp admission or SA. He reports that the patient lives in a 2 family home with her sister living on the apartment above and has a home health aid from 9am to 6pm.     Psychiatry will follow

## 2022-05-30 NOTE — CONSULT NOTE ADULT - ASSESSMENT
Impression:    Acute hypercapnic respiratory failure  Acute on chronic hypoxic respiratory failure  HO COPD on 2L home O2  AMS likely toxic metabolic from sepsis and drug toxicity improved  BZD overdose?  Sepsis on admission  RLL Subpleural opacities    PLAN:    CNS: SDS reviewed, send UDS, psych eval.    HEENT: Oral care    PULMONARY:  HOB @ 45 degrees. Alternate AVAPS and O2 by NC. AVAPS 4hrs on/4hrs off and QHS and PRN. Unasyn. Send sputum culture. target Spo2 88-92%. Will need repeat f/u chest CT as outpatient    CARDIOVASCULAR: Check echo. avoid fluid overload.    GI: GI prophylaxis.  NPO. S&S eval.    RENAL:  Follow up lytes.  Correct as needed    INFECTIOUS DISEASE: Follow up cultures. Unasyn. F/u urine legionella and strep.    HEMATOLOGICAL:  DVT prophylaxis.     ENDOCRINE:  Follow up FS.  Insulin protocol if needed.    MUSCULOSKELETAL: PT/OT    Poor prognosis  GOC  SDU         Impression:    Acute hypercapnic respiratory failure  Acute on chronic hypoxic respiratory failure  HO COPD on 2L home O2  AMS likely toxic metabolic from sepsis and drug toxicity improved  BZD overdose?  Sepsis on admission  RLL Subpleural opacities    PLAN:    CNS: SDS reviewed, send UDS, psych eval.    HEENT: Oral care    PULMONARY:  HOB @ 45 degrees. Alternate AVAPS and O2 by NC. AVAPS 4hrs on/4hrs off and QHS and PRN. Unasyn. Send sputum culture. target Spo2 88-92%. Will need repeat f/u chest CT as outpatient    CARDIOVASCULAR: Check echo. avoid fluid overload.    GI: GI prophylaxis.  NPO. S&S eval.    RENAL:  Follow up lytes.  Correct as needed    INFECTIOUS DISEASE: Follow up cultures. Unasyn. F/u urine legionella and strep.    HEMATOLOGICAL:  DVT prophylaxis.     ENDOCRINE:  Follow up FS.  Insulin protocol if needed.    MUSCULOSKELETAL: PT/OT    Poor prognosis  GOC  DNR/DNI  SDU       Impression:    Acute hypercapnic respiratory failure  Acute on chronic hypoxic respiratory failure  HO COPD on 2L home O2  AMS likely toxic metabolic from sepsis and drug toxicity improved  BZD overdose?  Sepsis on admission  RLL Subpleural opacities mass v. infiltrates    PLAN:    CNS: SDS reviewed, send UDS, psych eval.    HEENT: Oral care    PULMONARY:  HOB @ 45 degrees.   Alternate AVAPS and O2 by NC. AVAPS 4hrs on/4hrs off and QHS and PRN.   Unasyn. Send sputum culture. target Spo2 88-92%.   Will need repeat f/u chest CT as outpatient  pt would benefit from home AVAPS consult SS    CARDIOVASCULAR: Check echo. avoid fluid overload.    GI: GI prophylaxis.  NPO. S&S eval.    RENAL:  Follow up lytes.  Correct as needed    INFECTIOUS DISEASE: Follow up cultures. Unasyn. F/u urine legionella and strep.    HEMATOLOGICAL:  DVT prophylaxis.     ENDOCRINE:  Follow up FS.  Insulin protocol if needed.    MUSCULOSKELETAL: PT/OT    Poor prognosis  GOC  DNR/DNI  SDU

## 2022-05-30 NOTE — CONSULT NOTE ADULT - ATTENDING COMMENTS
Attending Statement: I have personally performed a face to face diagnostic evaluation on this patient. The patient is suffering from:  Acute hypercapnic respiratory failure  Acute on chronic hypoxic respiratory failure  HO COPD on 2L home O2  AMS likely toxic metabolic from sepsis and drug toxicity improved  BZD overdose?  Sepsi  I have made amendments to the documentation where necessary. I have personally seen and examined this patient.  I have fully participated in the care of this patient.  I have reviewed all pertinent clinical information, including history, physical exam, plan and note.

## 2022-05-30 NOTE — SWALLOW BEDSIDE ASSESSMENT ADULT - SLP GENERAL OBSERVATIONS
Pt received on stretcher in ED w/ sister at b/s. Sister admits pt forgets when she takes her meds and will take it more than once

## 2022-05-30 NOTE — CONSULT NOTE ADULT - ASSESSMENT
ASSESSMENT  76-year-old female history of COPD hypertension brought in by EMS for evaluation of lethargy. Questionable empty xanax bottle. ID consulted for PNA        IMPRESSION  #Suspected Aspiration pneumonitis with SIRS on admission T101 P>90 RR>20 , CXR on PNA    CXR Chronic interstitial changes without consolidation, effusion or pneumothorax.    UA without significant pyuria   #Abx allergy: Levaquin (Other)    Creatinine, Serum: 0.7 (05-29-22 @ 13:02)    Weight (kg): 54.4 (05-29-22 @ 12:16)    RECOMMENDATIONS  This is an incomplete consult note. All final recommendations to follow after interview and examination of the patient. Please follow recommendations noted below.    If any questions, please call or send a message on [facility] [employer]  Please continue to update ID with any pertinent new laboratory or radiographic findings  Spectra [phone] ASSESSMENT  76-year-old female history of COPD hypertension brought in by EMS for evaluation of lethargy. Questionable empty xanax bottle. ID consulted for PNA      IMPRESSION  #Suspected Aspiration pneumonitis with SIRS on admission T101 P>90 RR>20 , CXR on PNA with Acute hypercapnic respiratory failure    CXR Chronic interstitial changes without consolidation, effusion or pneumothorax.    UA without significant pyuria   #Possible benzo OD  #Metabolic encephalopathy  #Abx allergy: Levaquin (Other)    Creatinine, Serum: 0.7 (05-29-22 @ 13:02)    Weight (kg): 54.4 (05-29-22 @ 12:16)    RECOMMENDATIONS  - Monitor off antibiotics  - CXR no PNA     If any questions, please call or send a message on [facility] Teams  Please continue to update ID with any pertinent new laboratory or radiographic findings  Spectra [phone]

## 2022-05-30 NOTE — CONSULT NOTE ADULT - SUBJECTIVE AND OBJECTIVE BOX
EMILI VIR[place]IA  76y, Female  Allergy: Levaquin (Other)      CHIEF COMPLAINT:       LOS  1d    HPI  HPI:  76-year-old female history of COPD hypertension brought in by EMS for evaluation of lethargy.  EMS told us that the neighbor went over and found the patient with sluggish mental status with vomit around her mouth and had an empty bottle of Xanax next to her. Patient unable to answer if she took too much xanax intentionally or not. Patient is a poor historian. Of note, Patient reported history of aspiration PNA and was recently admitted for aspiration PNA. Case was discussed with son who reports that there are no missing xanax pills and did not feel that was a contributing factor to the patient's symptoms.    In the ED, CXR is negative for acute process. Patient initially febrile with white count of 13 initial ABG with a pH of 7.22 and a bicarb of 128.  Patient became more alert with help with a repeat gas with improve however repeat gas showed a pH of 7.29 the CO2 of on Jocelyn 3.  Patient was placed on BiPAP and approximate 1 hour later repeat gas showed similar values.  Case discussed with pulmonary patient was placed on AVAPS with resulting improvement of the VBG.  VBG demonstrated a pH of 7.34 CO2 of 85.  Patient is stable admitted to the stepdown unit with suspected aspiration pneumonia patient clinically is improving mental status is improving. She is given cefepime and clindamycin in ED.     Vital Signs Last 24 Hrs  T(C): 37.9 (29 May 2022 15:21), Max: 38.3 (29 May 2022 12:30)  T(F): 100.2 (29 May 2022 15:21), Max: 101 (29 May 2022 12:30)  HR: 100 (29 May 2022 21:07) (90 - 114)  BP: 134/68 (29 May 2022 21:07) (129/68 - 148/72)  BP(mean): --  RR: 20 (29 May 2022 21:07) (20 - 26)  SpO2: 98% (29 May 2022 21:07) (98% - 99%) (29 May 2022 21:33)      INFECTIOUS DISEASE HISTORY:  ID consulted for PNA    PMH  PAST MEDICAL & SURGICAL HISTORY:  COPD (chronic obstructive pulmonary disease)      HTN (hypertension)      Anxiety      History of cholecystectomy      History of hysterectomy          FAMILY HISTORY  Family history of COPD (chronic obstructive pulmonary disease)    Family history of congestive heart failure        SOCIAL HISTORY  Social History:  Substance Use (street drugs): ( x ) never used  (  ) other:  Tobacco Usage:  (   ) never smoked   ( x  ) former smoker   (   ) current smoker  (     ) pack year  Alcohol Usage: None (2022 02:08)        ROS  ***    VITALS:  T(F): 100.2, Max: 101 (22 @ 12:30)  HR: 99  BP: 120/71  RR: 20Vital Signs Last 24 Hrs  T(C): 37.9 (29 May 2022 15:21), Max: 38.3 (29 May 2022 12:30)  T(F): 100.2 (29 May 2022 15:21), Max: 101 (29 May 2022 12:30)  HR: 99 (30 May 2022 04:00) (90 - 114)  BP: 120/71 (30 May 2022 04:00) (120/71 - 148/72)  BP(mean): --  RR: 20 (30 May 2022 04:00) (20 - 26)  SpO2: 96% (30 May 2022 04:00) (96% - 99%)    PHYSICAL EXAM:  ***    TESTS & MEASUREMENTS:                        10.7   13.15 )-----------( 278      ( 29 May 2022 13:02 )             34.2         141  |  89<L>  |  15  ----------------------------<  133<H>  4.4   |  40<H>  |  0.7    Ca    10.0      29 May 2022 13:02    TPro  6.9  /  Alb  4.5  /  TBili  0.4  /  DBili  x   /  AST  15  /  ALT  7   /  AlkPhos  78        LIVER FUNCTIONS - ( 29 May 2022 13:02 )  Alb: 4.5 g/dL / Pro: 6.9 g/dL / ALK PHOS: 78 U/L / ALT: 7 U/L / AST: 15 U/L / GGT: x           Urinalysis Basic - ( 29 May 2022 13:12 )    Color: Light Yellow / Appearance: Clear / S.015 / pH: x  Gluc: x / Ketone: Small  / Bili: Negative / Urobili: <2 mg/dL   Blood: x / Protein: 30 mg/dL / Nitrite: Negative   Leuk Esterase: Negative / RBC: 2 /HPF / WBC 0 /HPF   Sq Epi: x / Non Sq Epi: 1 /HPF / Bacteria: Negative        Culture - Urine (collected 22 @ 08:37)  Source: Clean Catch Clean Catch (Midstream)  Final Report (22 @ 14:40):    No growth    Culture - Blood (collected 22 @ 04:30)  Source: .Blood Blood  Final Report (22 @ 14:01):    No Growth Final    Culture - Blood (collected 22 @ 18:30)  Source: .Blood Blood-Peripheral  Final Report (22 @ 01:01):    No Growth Final    Culture - Blood (collected 22 @ 18:30)  Source: .Blood Blood-Peripheral  Final Report (22 @ 01:01):    No Growth Final        Blood Gas Venous - Lactate: 0.90 mmol/L (22 @ 19:35)  Blood Gas Venous - Lactate: 0.70 mmol/L (22 @ 17:26)  Blood Gas Venous - Lactate: 0.50 mmol/L (22 @ 15:36)  Blood Gas Venous - Lactate: 0.80 mmol/L (22 @ 12:32)      INFECTIOUS DISEASES TESTING  COVID-19 PCR: NotDetec (22 @ 11:18)  Procalcitonin, Serum: 0.33 ng/mL (22 @ 06:00)  Rapid RVP Result: NotDetec (22 @ 18:05)      INFLAMMATORY MARKERS      RADIOLOGY & ADDITIONAL TESTS:  I have personally reviewed the last Chest xray  CXR  Xray Chest 1 View- PORTABLE-Urgent:   ACC: [id number] EXAM:  XR CHEST PORTABLE URGENT 1V                          PROCEDURE DATE:  2022          INTERPRETATION:  Clinical History / Reason for exam: Shortness of breath    Comparison : Chest radiograph May 23, 2022.    Technique/Positioning: Single AP view of the chest.    Findings:    Support devices: None.    Cardiac/mediastinum/hilum: Unremarkable.    Lung parenchyma/Pleura: Chronic interstitial changes without   consolidation, effusion or pneumothorax.    Skeleton/soft tissues: Unchanged.    Impression:    Chronic interstitial changes without consolidation, effusion or   pneumothorax.        --- End of Report ---            ELLIOT LANDAU MD; Attending Radiologist  This document has been electronically signed. May 29 45280:30PM (22 @ 13:48)      CT      CARDIOLOGY TESTING  12 Lead ECG:   Ventricular Rate 101 BPM    Atrial Rate 101 BPM    P-R Interval 146 ms    QRS Duration 78 ms    Q-T Interval 330 ms    QTC Calculation(Bazett) 427 ms    P Axis 65 degrees    R Axis -56 degrees    T Axis 27 degrees    Diagnosis Line Sinus tachycardia with Premature supraventricular complexes  Left anterior fascicular block  Minimal voltage criteria for LVH, may be normal variant  Possible Anteroseptal infarct , age undetermined  Abnormal ECG    Confirmed by Shahram Mcmanus ([id number]) on 2022 3:02:20 PM (22 @ 13:42)  12 Lead ECG:   Ventricular Rate 82 BPM    Atrial Rate 82 BPM    P-R Interval 164 ms    QRS Duration 90 ms    Q-T Interval 394 ms    QTC Calculation(Bazett) 460 ms    P Axis 82 degrees    R Axis -73 degrees    T Axis 83 degrees    Diagnosis Line Normal sinus rhythm with sinus arrhythmia  Left anterior fascicular block  Abnormal ECG    Confirmed by Rowena Church MD ([id number]) on 2022 5:52:53 PM (22 @ 12:08)      MEDICATIONS  albuterol/ipratropium for Nebulization 3 Nebulizer every 4 hours  amLODIPine   Tablet 10 Oral daily  ampicillin/sulbactam  IVPB     ampicillin/sulbactam  IVPB 3 IV Intermittent every 6 hours  budesonide 160 MICROgram(s)/formoterol 4.5 MICROgram(s) Inhaler 2 Inhalation two times a day  enalapril 5 Oral daily  enoxaparin Injectable 40 SubCutaneous every 24 hours  folic acid 1 Oral daily  pantoprazole    Tablet 40 Oral before breakfast  PARoxetine 10 Oral daily  tiotropium 18 MICROgram(s) Capsule 1 Inhalation daily        ANTIBIOTICS:  ampicillin/sulbactam  IVPB      ampicillin/sulbactam  IVPB 3 Gram(s) IV Intermittent every 6 hours      ALLERGIES:  Levaquin (Other)           VNETURA MOCK  76y, Female  Allergy: Levaquin (Other)      CHIEF COMPLAINT:       LOS  1d    HPI  HPI:  76-year-old female history of COPD hypertension brought in by EMS for evaluation of lethargy.  EMS told us that the neighbor went over and found the patient with sluggish mental status with vomit around her mouth and had an empty bottle of Xanax next to her. Patient unable to answer if she took too much xanax intentionally or not. Patient is a poor historian. Of note, Patient reported history of aspiration PNA and was recently admitted for aspiration PNA. Case was discussed with son who reports that there are no missing xanax pills and did not feel that was a contributing factor to the patient's symptoms.    In the ED, CXR is negative for acute process. Patient initially febrile with white count of 13 initial ABG with a pH of 7.22 and a bicarb of 128.  Patient became more alert with help with a repeat gas with improve however repeat gas showed a pH of 7.29 the CO2 of on Jocelyn 3.  Patient was placed on BiPAP and approximate 1 hour later repeat gas showed similar values.  Case discussed with pulmonary patient was placed on AVAPS with resulting improvement of the VBG.  VBG demonstrated a pH of 7.34 CO2 of 85.  Patient is stable admitted to the stepdown unit with suspected aspiration pneumonia patient clinically is improving mental status is improving. She is given cefepime and clindamycin in ED.     Vital Signs Last 24 Hrs  T(C): 37.9 (29 May 2022 15:21), Max: 38.3 (29 May 2022 12:30)  T(F): 100.2 (29 May 2022 15:21), Max: 101 (29 May 2022 12:30)  HR: 100 (29 May 2022 21:07) (90 - 114)  BP: 134/68 (29 May 2022 21:07) (129/68 - 148/72)  BP(mean): --  RR: 20 (29 May 2022 21:07) (20 - 26)  SpO2: 98% (29 May 2022 21:07) (98% - 99%) (29 May 2022 21:33)      INFECTIOUS DISEASE HISTORY:  ID consulted for PNA  denies productive cough  high CO2    PMH  PAST MEDICAL & SURGICAL HISTORY:  COPD (chronic obstructive pulmonary disease)      HTN (hypertension)      Anxiety      History of cholecystectomy      History of hysterectomy          FAMILY HISTORY  Family history of COPD (chronic obstructive pulmonary disease)    Family history of congestive heart failure        SOCIAL HISTORY  Social History:  Substance Use (street drugs): ( x ) never used  (  ) other:  Tobacco Usage:  (   ) never smoked   ( x  ) former smoker   (   ) current smoker  (     ) pack year  Alcohol Usage: None (2022 02:08)        ROS  General: Denies rigors, nightsweats  HEENT: Denies headache, rhinorrhea, sore throat, eye pain  CV: Denies CP, palpitations  PULM: as noted above   GI: Denies hematemesis, hematochezia, melena  : Denies discharge, hematuria  MSK: Denies arthralgias, myalgias  SKIN: Denies rash, lesions  NEURO: Denies paresthesias, weakness  PSYCH: Denies depression, anxiety     VITALS:  T(F): 100.2, Max: 101 (22 @ 12:30)  HR: 99  BP: 120/71  RR: 20Vital Signs Last 24 Hrs  T(C): 37.9 (29 May 2022 15:21), Max: 38.3 (29 May 2022 12:30)  T(F): 100.2 (29 May 2022 15:21), Max: 101 (29 May 2022 12:30)  HR: 99 (30 May 2022 04:00) (90 - 114)  BP: 120/71 (30 May 2022 04:00) (120/71 - 148/72)  BP(mean): --  RR: 20 (30 May 2022 04:00) (20 - 26)  SpO2: 96% (30 May 2022 04:00) (96% - 99%)    PHYSICAL EXAM:  Gen: NAD, on BIPAP  HEENT: Normocephalic, atraumatic  Neck: supple, no lymphadenopathy  CV: Regular rate & regular rhythm  Lungs: decreased BS at bases, no fremitus  Abdomen: Soft, BS present  Ext: Warm, well perfused  Neuro: non focal, awake  Skin: no rash, no erythema  Lines: no phlebitis     TESTS & MEASUREMENTS:                        10.7   13.15 )-----------( 278      ( 29 May 2022 13:02 )             34.2         141  |  89<L>  |  15  ----------------------------<  133<H>  4.4   |  40<H>  |  0.7    Ca    10.0      29 May 2022 13:02    TPro  6.9  /  Alb  4.5  /  TBili  0.4  /  DBili  x   /  AST  15  /  ALT  7   /  AlkPhos  78        LIVER FUNCTIONS - ( 29 May 2022 13:02 )  Alb: 4.5 g/dL / Pro: 6.9 g/dL / ALK PHOS: 78 U/L / ALT: 7 U/L / AST: 15 U/L / GGT: x           Urinalysis Basic - ( 29 May 2022 13:12 )    Color: Light Yellow / Appearance: Clear / S.015 / pH: x  Gluc: x / Ketone: Small  / Bili: Negative / Urobili: <2 mg/dL   Blood: x / Protein: 30 mg/dL / Nitrite: Negative   Leuk Esterase: Negative / RBC: 2 /HPF / WBC 0 /HPF   Sq Epi: x / Non Sq Epi: 1 /HPF / Bacteria: Negative        Culture - Urine (collected 22 @ 08:37)  Source: Clean Catch Clean Catch (Midstream)  Final Report (22 @ 14:40):    No growth    Culture - Blood (collected 22 @ 04:30)  Source: .Blood Blood  Final Report (22 @ 14:01):    No Growth Final    Culture - Blood (collected 22 @ 18:30)  Source: .Blood Blood-Peripheral  Final Report (22 @ 01:01):    No Growth Final    Culture - Blood (collected 22 @ 18:30)  Source: .Blood Blood-Peripheral  Final Report (22 @ 01:01):    No Growth Final        Blood Gas Venous - Lactate: 0.90 mmol/L (22 @ 19:35)  Blood Gas Venous - Lactate: 0.70 mmol/L (22 @ 17:26)  Blood Gas Venous - Lactate: 0.50 mmol/L (22 @ 15:36)  Blood Gas Venous - Lactate: 0.80 mmol/L (22 @ 12:32)      INFECTIOUS DISEASES TESTING  COVID-19 PCR: NotDetec (22 @ 11:18)  Procalcitonin, Serum: 0.33 ng/mL (22 @ 06:00)  Rapid RVP Result: NotDetec (22 @ 18:05)      INFLAMMATORY MARKERS      RADIOLOGY & ADDITIONAL TESTS:  I have personally reviewed the last Chest xray  CXR  Xray Chest 1 View- PORTABLE-Urgent:   ACC: [id number] EXAM:  XR CHEST PORTABLE URGENT 1V                          PROCEDURE DATE:  2022          INTERPRETATION:  Clinical History / Reason for exam: Shortness of breath    Comparison : Chest radiograph May 23, 2022.    Technique/Positioning: Single AP view of the chest.    Findings:    Support devices: None.    Cardiac/mediastinum/hilum: Unremarkable.    Lung parenchyma/Pleura: Chronic interstitial changes without   consolidation, effusion or pneumothorax.    Skeleton/soft tissues: Unchanged.    Impression:    Chronic interstitial changes without consolidation, effusion or   pneumothorax.        --- End of Report ---            ELLIOT LANDAU MD; Attending Radiologist  This document has been electronically signed. May 29 16909:30PM (22 @ 13:48)      CT      CARDIOLOGY TESTING  12 Lead ECG:   Ventricular Rate 101 BPM    Atrial Rate 101 BPM    P-R Interval 146 ms    QRS Duration 78 ms    Q-T Interval 330 ms    QTC Calculation(Bazett) 427 ms    P Axis 65 degrees    R Axis -56 degrees    T Axis 27 degrees    Diagnosis Line Sinus tachycardia with Premature supraventricular complexes  Left anterior fascicular block  Minimal voltage criteria for LVH, may be normal variant  Possible Anteroseptal infarct , age undetermined  Abnormal ECG    Confirmed by Shahram Mcmanus ([id number]) on 2022 3:02:20 PM (22 @ 13:42)  12 Lead ECG:   Ventricular Rate 82 BPM    Atrial Rate 82 BPM    P-R Interval 164 ms    QRS Duration 90 ms    Q-T Interval 394 ms    QTC Calculation(Bazett) 460 ms    P Axis 82 degrees    R Axis -73 degrees    T Axis 83 degrees    Diagnosis Line Normal sinus rhythm with sinus arrhythmia  Left anterior fascicular block  Abnormal ECG    Confirmed by Rowena Church MD ([id number]) on 2022 5:52:53 PM (22 @ 12:08)      MEDICATIONS  albuterol/ipratropium for Nebulization 3 Nebulizer every 4 hours  amLODIPine   Tablet 10 Oral daily  ampicillin/sulbactam  IVPB     ampicillin/sulbactam  IVPB 3 IV Intermittent every 6 hours  budesonide 160 MICROgram(s)/formoterol 4.5 MICROgram(s) Inhaler 2 Inhalation two times a day  enalapril 5 Oral daily  enoxaparin Injectable 40 SubCutaneous every 24 hours  folic acid 1 Oral daily  pantoprazole    Tablet 40 Oral before breakfast  PARoxetine 10 Oral daily  tiotropium 18 MICROgram(s) Capsule 1 Inhalation daily        ANTIBIOTICS:  ampicillin/sulbactam  IVPB      ampicillin/sulbactam  IVPB 3 Gram(s) IV Intermittent every 6 hours      ALLERGIES:  Levaquin (Other)

## 2022-05-30 NOTE — SWALLOW BEDSIDE ASSESSMENT ADULT - NS SPL SWALLOW CLINIC TRIAL FT
+toleration of thin liquids and puree, increased resp effort w/ minced and moist trials, +baseline cough, dyspneic on 5L O2

## 2022-05-30 NOTE — SWALLOW BEDSIDE ASSESSMENT ADULT - ASR SWALLOW ASPIRATION MONITOR
position upright (90Y)/cough/gurgly voice/fever/pneumonia/throat clearing/upper respiratory infection

## 2022-05-30 NOTE — CONSULT NOTE ADULT - SUBJECTIVE AND OBJECTIVE BOX
Patient is a 76y old  Female who presents with a chief complaint of     HPI:  76-year-old female history of COPD hypertension brought in by EMS for evaluation of lethargy.  EMS told us that the neighbor went over and found the patient with sluggish mental status with vomit around her mouth and had an empty bottle of Xanax next to her. Patient unable to answer if she took too much xanax intentionally or not. Patient is a poor historian. Of note, Patient reported history of aspiration ammonia and was recently admitted for aspiration PNA. Case was discussed with son who reports that there are no missing xanax pills and did not feel that was a contributing factor to the patient's symptoms.    In the ED, CXR is negative for acute process. Patient initially febrile with white count of 13 initial ABG with a pH of 7.22 and a bicarb of 128.  Patient became more alert with help with a repeat gas with improve however repeat gas showed a pH of 7.29 the CO2 of on Jocelyn 3.  Patient was placed on BiPAP and approximate 1 hour later repeat gas showed similar values.  Case discussed with pulmonary patient was placed on AVAPS with resulting improvement of the VBG.  VBG demonstrated a pH of 7.34 CO2 of 85.  Patient is stable admitted to the stepdown unit with suspected aspiration pneumonia patient clinically is improving mental status is improving. She is given cefepime and clindamycin in ED.     Vital Signs Last 24 Hrs  T(C): 37.9 (29 May 2022 15:21), Max: 38.3 (29 May 2022 12:30)  T(F): 100.2 (29 May 2022 15:21), Max: 101 (29 May 2022 12:30)  HR: 100 (29 May 2022 21:07) (90 - 114)  BP: 134/68 (29 May 2022 21:07) (129/68 - 148/72)  BP(mean): --  RR: 20 (29 May 2022 21:07) (20 - 26)  SpO2: 98% (29 May 2022 21:07) (98% - 99%) (29 May 2022 21:33)      PAST MEDICAL & SURGICAL HISTORY:  COPD (chronic obstructive pulmonary disease)      HTN (hypertension)      Anxiety      History of cholecystectomy      History of hysterectomy          Occupational hx:    Social hx:    FAMILY HISTORY:  Family history of COPD (chronic obstructive pulmonary disease)  In father    Family history of congestive heart failure    :  No known cardiovacular family hisotry     ROS:  See HPI     Allergies    Levaquin (Other)    Intolerances          PHYSICAL EXAM    ICU Vital Signs Last 24 Hrs  T(C): 37.9 (29 May 2022 15:21), Max: 38.3 (29 May 2022 12:30)  T(F): 100.2 (29 May 2022 15:21), Max: 101 (29 May 2022 12:30)  HR: 108 (30 May 2022 08:03) (90 - 120)  BP: 147/75 (30 May 2022 08:03) (120/71 - 170/84)  BP(mean): 104 (30 May 2022 08:03) (104 - 104)  ABP: --  ABP(mean): --  RR: 22 (30 May 2022 08:03) (20 - 26)  SpO2: 99% (30 May 2022 08:03) (96% - 99%)      CONSTITUTIONAL:  Well nourished.  NAD    ENT:   Airway patent,   No thrush    EYES:   Clear bilaterally,   pupils equal,   round and reactive to light.    CARDIAC:   Normal rate,   regular rhythm.    no edema      CAROTID:   normal systolic impulse  no bruits    RESPIRATORY:   No wheezing  Normal chest expansion  Not tachypneic,  No use of accessory muscles    GASTROINTESTINAL:  Abdomen soft,   non-tender,   no guarding,   + BS    MUSCULOSKELETAL:   range of motion is not limited,  no clubbing, cyanosis    NEUROLOGICAL:   Alert and oriented   no motor deficits.    SKIN:   Skin normal color for race,   No evidence of rash.    PSYCHIATRIC:   normal mood and affect.   no apparent risk to self or others.      22 @ 07:01  -  22 @ 07:00  --------------------------------------------------------  IN:  Total IN: 0 mL    OUT:    Indwelling Catheter - Urethral (mL): 350 mL  Total OUT: 350 mL    Total NET: -350 mL          LABS:                          10.7   13.15 )-----------( 278      ( 29 May 2022 13:02 )             34.2                                                   141  |  89<L>  |  15  ----------------------------<  133<H>  4.4   |  40<H>  |  0.7    Ca    10.0      29 May 2022 13:02    TPro  6.9  /  Alb  4.5  /  TBili  0.4  /  DBili  x   /  AST  15  /  ALT  7   /  AlkPhos  78  05-29                                             Urinalysis Basic - ( 29 May 2022 13:12 )    Color: Light Yellow / Appearance: Clear / S.015 / pH: x  Gluc: x / Ketone: Small  / Bili: Negative / Urobili: <2 mg/dL   Blood: x / Protein: 30 mg/dL / Nitrite: Negative   Leuk Esterase: Negative / RBC: 2 /HPF / WBC 0 /HPF   Sq Epi: x / Non Sq Epi: 1 /HPF / Bacteria: Negative        CARDIAC MARKERS ( 29 May 2022 13:02 )  x     / <0.01 ng/mL / x     / x     / x                                                LIVER FUNCTIONS - ( 29 May 2022 13:02 )  Alb: 4.5 g/dL / Pro: 6.9 g/dL / ALK PHOS: 78 U/L / ALT: 7 U/L / AST: 15 U/L / GGT: x                                                                                                                                       X-Rays                                                                                     ECHO    MEDICATIONS  (STANDING):  albuterol/ipratropium for Nebulization 3 milliLiter(s) Nebulizer every 4 hours  amLODIPine   Tablet 10 milliGRAM(s) Oral daily  ampicillin/sulbactam  IVPB      ampicillin/sulbactam  IVPB 3 Gram(s) IV Intermittent every 6 hours  budesonide 160 MICROgram(s)/formoterol 4.5 MICROgram(s) Inhaler 2 Puff(s) Inhalation two times a day  enalapril 5 milliGRAM(s) Oral daily  enoxaparin Injectable 40 milliGRAM(s) SubCutaneous every 24 hours  folic acid 1 milliGRAM(s) Oral daily  pantoprazole    Tablet 40 milliGRAM(s) Oral before breakfast  PARoxetine 10 milliGRAM(s) Oral daily  tiotropium 18 MICROgram(s) Capsule 1 Capsule(s) Inhalation daily    MEDICATIONS  (PRN):  acetaminophen     Tablet .. 650 milliGRAM(s) Oral every 6 hours PRN Temp greater or equal to 38C (100.4F), Mild Pain (1 - 3)         Patient is a 76y old  Female who presents with a chief complaint of     HPI:  76-year-old female history of COPD hypertension brought in by EMS for evaluation of lethargy.  EMS told us that the neighbor went over and found the patient with sluggish mental status with vomit around her mouth and had an empty bottle of Xanax next to her. Patient unable to answer if she took too much xanax intentionally or not. Patient is a poor historian. Of note, Patient reported history of aspiration ammonia and was recently admitted for aspiration PNA. Case was discussed with son who reports that there are no missing xanax pills and did not feel that was a contributing factor to the patient's symptoms.    In the ED, CXR is negative for acute process. Patient initially febrile with white count of 13 initial ABG with a pH of 7.22 and a bicarb of 128.  Patient became more alert with help with a repeat gas with improve however repeat gas showed a pH of 7.29 the CO2 of on Jocelyn 3.  Patient was placed on BiPAP and approximate 1 hour later repeat gas showed similar values.  Case discussed with pulmonary patient was placed on AVAPS with resulting improvement of the VBG.  VBG demonstrated a pH of 7.34 CO2 of 85.  Patient is stable admitted to the stepdown unit with suspected aspiration pneumonia patient clinically is improving mental status is improving. She is given cefepime and clindamycin in ED.     Vital Signs Last 24 Hrs  T(C): 37.9 (29 May 2022 15:21), Max: 38.3 (29 May 2022 12:30)  T(F): 100.2 (29 May 2022 15:21), Max: 101 (29 May 2022 12:30)  HR: 100 (29 May 2022 21:07) (90 - 114)  BP: 134/68 (29 May 2022 21:07) (129/68 - 148/72)  BP(mean): --  RR: 20 (29 May 2022 21:07) (20 - 26)  SpO2: 98% (29 May 2022 21:07) (98% - 99%) (29 May 2022 21:33)      PAST MEDICAL & SURGICAL HISTORY:  COPD (chronic obstructive pulmonary disease)      HTN (hypertension)      Anxiety      History of cholecystectomy      History of hysterectomy      FAMILY HISTORY:  Family history of COPD (chronic obstructive pulmonary disease)  In father    Family history of congestive heart failure    :  No known cardiovacular family hisotry     ROS:  See HPI     Allergies    Levaquin (Other)    Intolerances          PHYSICAL EXAM    ICU Vital Signs Last 24 Hrs  T(C): 37.9 (29 May 2022 15:21), Max: 38.3 (29 May 2022 12:30)  T(F): 100.2 (29 May 2022 15:21), Max: 101 (29 May 2022 12:30)  HR: 108 (30 May 2022 08:03) (90 - 120)  BP: 147/75 (30 May 2022 08:03) (120/71 - 170/84)  BP(mean): 104 (30 May 2022 08:03) (104 - 104)  ABP: --  ABP(mean): --  RR: 22 (30 May 2022 08:03) (20 - 26)  SpO2: 99% (30 May 2022 08:03) (96% - 99%)      CONSTITUTIONAL:  In NAD    ENT:   Airway patent,   No thrush    EYES:   Clear bilaterally,   pupils equal,   round and reactive to light.    CARDIAC:   Normal rate,   regular rhythm.    no edema      CAROTID:   normal systolic impulse  no bruits    RESPIRATORY:   Decreased breath sounds  No wheezing  Normal chest expansion  Not tachypneic,  No use of accessory muscles    GASTROINTESTINAL:  Abdomen soft,   non-tender,   no guarding,   + BS    MUSCULOSKELETAL:   range of motion is not limited,  no clubbing, cyanosis    NEUROLOGICAL:   Alert and oriented   no motor deficits.    SKIN:   Skin normal color for race,   No evidence of rash.    PSYCHIATRIC:   normal mood and affect.   no apparent risk to self or others.      22 @ 07:01  -  22 @ 07:00  --------------------------------------------------------  IN:  Total IN: 0 mL    OUT:    Indwelling Catheter - Urethral (mL): 350 mL  Total OUT: 350 mL    Total NET: -350 mL          LABS:                          10.7   13.15 )-----------( 278      ( 29 May 2022 13:02 )             34.2                                                   141  |  89<L>  |  15  ----------------------------<  133<H>  4.4   |  40<H>  |  0.7    Ca    10.0      29 May 2022 13:02    TPro  6.9  /  Alb  4.5  /  TBili  0.4  /  DBili  x   /  AST  15  /  ALT  7   /  AlkPhos  78                                               Urinalysis Basic - ( 29 May 2022 13:12 )    Color: Light Yellow / Appearance: Clear / S.015 / pH: x  Gluc: x / Ketone: Small  / Bili: Negative / Urobili: <2 mg/dL   Blood: x / Protein: 30 mg/dL / Nitrite: Negative   Leuk Esterase: Negative / RBC: 2 /HPF / WBC 0 /HPF   Sq Epi: x / Non Sq Epi: 1 /HPF / Bacteria: Negative        CARDIAC MARKERS ( 29 May 2022 13:02 )  x     / <0.01 ng/mL / x     / x     / x                                                LIVER FUNCTIONS - ( 29 May 2022 13:02 )  Alb: 4.5 g/dL / Pro: 6.9 g/dL / ALK PHOS: 78 U/L / ALT: 7 U/L / AST: 15 U/L / GGT: x                                                                                                                                       X-Rays                                                                                     ECHO    MEDICATIONS  (STANDING):  albuterol/ipratropium for Nebulization 3 milliLiter(s) Nebulizer every 4 hours  amLODIPine   Tablet 10 milliGRAM(s) Oral daily  ampicillin/sulbactam  IVPB      ampicillin/sulbactam  IVPB 3 Gram(s) IV Intermittent every 6 hours  budesonide 160 MICROgram(s)/formoterol 4.5 MICROgram(s) Inhaler 2 Puff(s) Inhalation two times a day  enalapril 5 milliGRAM(s) Oral daily  enoxaparin Injectable 40 milliGRAM(s) SubCutaneous every 24 hours  folic acid 1 milliGRAM(s) Oral daily  pantoprazole    Tablet 40 milliGRAM(s) Oral before breakfast  PARoxetine 10 milliGRAM(s) Oral daily  tiotropium 18 MICROgram(s) Capsule 1 Capsule(s) Inhalation daily    MEDICATIONS  (PRN):  acetaminophen     Tablet .. 650 milliGRAM(s) Oral every 6 hours PRN Temp greater or equal to 38C (100.4F), Mild Pain (1 - 3)

## 2022-05-31 NOTE — ED ADULT NURSE REASSESSMENT NOTE - NS ED NURSE REASSESS COMMENT FT1
Notified MD Saucedo [phone] that pt desatting into high 70's. pt cold to touch, recommended heating blanket to increase circulation. MD Saucedo wrote order. RN applied meo saab heating blanket as per order. MD Saucedo and Respiratory Therapist came to bedside to assess.

## 2022-05-31 NOTE — CHART NOTE - NSCHARTNOTEFT_GEN_A_CORE
spoke to pts daughter in law at bedside. Pts family wants pt to receive PPN if no means of nutrition is possible. Consulted nutrition. Pt also had hyperkalemia with rising Cr, Discontinued enalapril for now, restart if needed. Pt was dry on exam started NS at 70. Please notify family for any changes and discuss goals of care, family wants to be updated by the morning team yanci

## 2022-06-01 NOTE — PROGRESS NOTE ADULT - SUBJECTIVE AND OBJECTIVE BOX
VENTURA MOCK  76y, Female  Allergy: Levaquin (Other)    Hospital Day: 1d    Patient seen and examined. No acute events overnight    PMH/PSH:  PAST MEDICAL & SURGICAL HISTORY:  COPD (chronic obstructive pulmonary disease)      HTN (hypertension)      Anxiety      History of cholecystectomy      History of hysterectomy          VITALS:  T(F): 99.7 (22 @ 11:00), Max: 101 (22 @ 12:30)  HR: 107 (22 @ 11:00)  BP: 140/80 (22 @ 11:00) (120/71 - 170/84)  RR: 24 (22 @ 11:00)  SpO2: 100% (22 @ 11:00)    TESTS & MEASUREMENTS:  Weight (Kg): 54.4 (22 @ 12:16)  BMI (kg/m2): 21.3 ()    22 @ 07:01  -  22 @ 07:00  --------------------------------------------------------  IN: 0 mL / OUT: 350 mL / NET: -350 mL                            10.7   13.15 )-----------( 278      ( 29 May 2022 13:02 )             34.2           141  |  89<L>  |  15  ----------------------------<  133<H>  4.4   |  40<H>  |  0.7    Ca    10.0      29 May 2022 13:02    TPro  6.9  /  Alb  4.5  /  TBili  0.4  /  DBili  x   /  AST  15  /  ALT  7   /  AlkPhos  78      LIVER FUNCTIONS - ( 29 May 2022 13:02 )  Alb: 4.5 g/dL / Pro: 6.9 g/dL / ALK PHOS: 78 U/L / ALT: 7 U/L / AST: 15 U/L / GGT: x           CARDIAC MARKERS ( 29 May 2022 13:02 )  x     / <0.01 ng/mL / x     / x     / x            Urinalysis Basic - ( 29 May 2022 13:12 )    Color: Light Yellow / Appearance: Clear / S.015 / pH: x  Gluc: x / Ketone: Small  / Bili: Negative / Urobili: <2 mg/dL   Blood: x / Protein: 30 mg/dL / Nitrite: Negative   Leuk Esterase: Negative / RBC: 2 /HPF / WBC 0 /HPF   Sq Epi: x / Non Sq Epi: 1 /HPF / Bacteria: Negative        RADIOLOGY & ADDITIONAL TESTS:    RECENT DIAGNOSTIC ORDERS:  Toxicology Screen, Drugs of Abuse, Urine: Routine (22 @ 10:38)  Magnesium, Serum: AM Sched. Collection: 31-May-2022 04:30 (22 @ 07:59)  Comprehensive Metabolic Panel: AM Sched. Collection: 31-May-2022 04:30 (22 @ 07:59)  Complete Blood Count: AM Sched. Collection: 31-May-2022 04:30 (22 @ 07:59)  Magnesium, Serum: 11:00 (22 @ 05:38)  Comprehensive Metabolic Panel: 11:00 (22 @ 05:38)  Complete Blood Count: 11:00 (22 @ 05:38)  Blood Gas Arterial - Lytes,iCa,Lact: AM Sched. Collection:30-May-2022 04:30 (22 @ 22:24)  Streptococcus pneumoniae Ag, Ur: Routine (22 @ 22:24)  Legionella pneumophila Antigen, Urine: Routine (22 @ 22:24)  MRSA/MSSA PCR: Routine (22 @ 22:24)  Culture - Sputum: Routine  Specimen Source: Sputum (22 @ 22:24)  Diet, NPO:   Except Medications (22 @ 22:24)  Culture - Blood: STAT  Specimen Source: Blood-Peripheral (22 @ 12:38)  Culture - Blood: STAT  Specimen Source: Blood-Peripheral (22 @ 12:38)  Culture - Urine: Routine  Specimen Source: Catheterized  Addl Info: If indwelling urinary catheter > 14 days, obtain an order to remove and replace prior to c (22 @ 12:38)      MEDICATIONS:  MEDICATIONS  (STANDING):  albuterol/ipratropium for Nebulization 3 milliLiter(s) Nebulizer every 4 hours  amLODIPine   Tablet 10 milliGRAM(s) Oral daily  ampicillin/sulbactam  IVPB      ampicillin/sulbactam  IVPB 3 Gram(s) IV Intermittent every 6 hours  budesonide 160 MICROgram(s)/formoterol 4.5 MICROgram(s) Inhaler 2 Puff(s) Inhalation two times a day  enalapril 5 milliGRAM(s) Oral daily  enoxaparin Injectable 40 milliGRAM(s) SubCutaneous every 24 hours  folic acid 1 milliGRAM(s) Oral daily  pantoprazole    Tablet 40 milliGRAM(s) Oral before breakfast  PARoxetine 10 milliGRAM(s) Oral daily  tiotropium 18 MICROgram(s) Capsule 1 Capsule(s) Inhalation daily    MEDICATIONS  (PRN):  acetaminophen     Tablet .. 650 milliGRAM(s) Oral every 6 hours PRN Temp greater or equal to 38C (100.4F), Mild Pain (1 - 3)      HOME MEDICATIONS:  albuterol 2.5 mg/3 mL (0.083%) inhalation solution ()  ALPRAZolam 0.25 mg oral tablet ()  enalapril 5 mg oral tablet (10-23)  Incruse Ellipta 62.5 mcg/inh inhalation powder ()  Paxil 10 mg oral tablet ()  ProAir HFA 90 mcg/inh inhalation aerosol ()      REVIEW OF SYSTEMS:  All other review of systems is negative unless indicated above.     PHYSICAL EXAM:  PHYSICAL EXAM:  GENERAL: NAD, well-developed  HEAD:  Atraumatic, Normocephalic  NECK: Supple, No JVD  CHEST/LUNG: Decreased breath sounds  HEART: Regular rate and rhythm; No murmurs, rubs, or gallops  ABDOMEN: Soft, Nontender, Nondistended; Bowel sounds present  EXTREMITIES:  2+ Peripheral Pulses, No clubbing, cyanosis, or edema  SKIN: No rashes or lesions      
  VENTURA MOCK  76y, Female  Allergy: Levaquin (Other)    Hospital Day: 2d    Patient seen and examined. No acute events overnight    PMH/PSH:  PAST MEDICAL & SURGICAL HISTORY:  COPD (chronic obstructive pulmonary disease)      HTN (hypertension)      Anxiety      History of cholecystectomy      History of hysterectomy          VITALS:  T(F): 98.4 (22 @ 07:18), Max: 99.7 (22 @ 11:00)  HR: 96 (22 @ 08:50)  BP: 107/56 (22 @ 07:18) (107/56 - 170/84)  RR: 20 (22 @ 08:11)  SpO2: 94% (22 @ 08:50)    TESTS & MEASUREMENTS:  Weight (Kg): 54.4 (22 @ 12:16)  BMI (kg/m2): 21.3 ()    22 @ 07:01  -  22 @ 07:00  --------------------------------------------------------  IN: 0 mL / OUT: 350 mL / NET: -350 mL    22 @ 07:01  -  22 @ 07:00  --------------------------------------------------------  IN: 0 mL / OUT: 150 mL / NET: -150 mL    22 @ 07:01  -  22 @ 10:53  --------------------------------------------------------  IN: 0 mL / OUT: 100 mL / NET: -100 mL                            10.8   20.72 )-----------( 225      ( 31 May 2022 06:29 )             35.4           141  |  91<L>  |  52<H>  ----------------------------<  100<H>  5.9<H>   |  34<H>  |  1.4    Ca    9.0      31 May 2022 06:29  Mg     2.0         TPro  6.2  /  Alb  3.6  /  TBili  0.3  /  DBili  x   /  AST  23  /  ALT  9   /  AlkPhos  80      LIVER FUNCTIONS - ( 31 May 2022 06:29 )  Alb: 3.6 g/dL / Pro: 6.2 g/dL / ALK PHOS: 80 U/L / ALT: 9 U/L / AST: 23 U/L / GGT: x           CARDIAC MARKERS ( 29 May 2022 13:02 )  x     / <0.01 ng/mL / x     / x     / x            Culture - Urine (collected 22 @ 13:12)  Source: Catheterized Catheterized  Final Report (22 @ 14:48):    <10,000 CFU/mL Normal Urogenital Edie    Culture - Blood (collected 22 @ 13:02)  Source: .Blood Blood-Peripheral  Preliminary Report (22 @ 19:01):    No growth to date.    Culture - Blood (collected 22 @ 13:02)  Source: .Blood Blood-Peripheral  Preliminary Report (22 @ 19:01):    No growth to date.      Urinalysis Basic - ( 29 May 2022 13:12 )    Color: Light Yellow / Appearance: Clear / S.015 / pH: x  Gluc: x / Ketone: Small  / Bili: Negative / Urobili: <2 mg/dL   Blood: x / Protein: 30 mg/dL / Nitrite: Negative   Leuk Esterase: Negative / RBC: 2 /HPF / WBC 0 /HPF   Sq Epi: x / Non Sq Epi: 1 /HPF / Bacteria: Negative        RADIOLOGY & ADDITIONAL TESTS:    RECENT DIAGNOSTIC ORDERS:  Magnesium, Serum: AM Sched. Collection: 2022 04:30 (22 @ 09:59)  Comprehensive Metabolic Panel: AM Sched. Collection: 2022 04:30 (22 @ 09:59)  Complete Blood Count: AM Sched. Collection: 2022 04:30 (22 @ 09:59)  Procalcitonin, Serum: AM Sched. Collection: 31-May-2022 04:30 (22 @ 12:18)      MEDICATIONS:  MEDICATIONS  (STANDING):  albuterol/ipratropium for Nebulization 3 milliLiter(s) Nebulizer every 4 hours  amLODIPine   Tablet 10 milliGRAM(s) Oral daily  ampicillin/sulbactam  IVPB      ampicillin/sulbactam  IVPB 3 Gram(s) IV Intermittent every 6 hours  budesonide 160 MICROgram(s)/formoterol 4.5 MICROgram(s) Inhaler 2 Puff(s) Inhalation two times a day  enalapril 5 milliGRAM(s) Oral daily  enoxaparin Injectable 40 milliGRAM(s) SubCutaneous every 24 hours  folic acid 1 milliGRAM(s) Oral daily  pantoprazole    Tablet 40 milliGRAM(s) Oral before breakfast  PARoxetine 10 milliGRAM(s) Oral daily  tiotropium 18 MICROgram(s) Capsule 1 Capsule(s) Inhalation daily    MEDICATIONS  (PRN):  acetaminophen     Tablet .. 650 milliGRAM(s) Oral every 6 hours PRN Temp greater or equal to 38C (100.4F), Mild Pain (1 - 3)      HOME MEDICATIONS:  albuterol 2.5 mg/3 mL (0.083%) inhalation solution ()  ALPRAZolam 0.25 mg oral tablet ()  enalapril 5 mg oral tablet (10-23)  Incruse Ellipta 62.5 mcg/inh inhalation powder ()  Paxil 10 mg oral tablet ()  ProAir HFA 90 mcg/inh inhalation aerosol ()      REVIEW OF SYSTEMS:  All other review of systems is negative unless indicated above.     PHYSICAL EXAM:  PHYSICAL EXAM:  GENERAL: NAD, well-developed  HEAD:  Atraumatic, Normocephalic  NECK: Supple, No JVD  CHEST/LUNG: Decreased breath sounds bilaterally  HEART: Regular rate and rhythm; No murmurs, rubs, or gallops  ABDOMEN: Soft, Nontender, Nondistended; Bowel sounds present  EXTREMITIES:  2+ Peripheral Pulses, No clubbing, cyanosis, or edema  SKIN: No rashes or lesions      
VENTURA MOCK 76y Female  MRN#: [id number]   Hospital Day: 2d    SUBJECTIVE  Patient is a 76y old Female who presents with a chief complaint of Currently admitted to medicine with the primary diagnosis of Acute respiratory failure with hypercapnia      INTERVAL HPI AND OVERNIGHT EVENTS:  Patient was examined and seen at bedside. This morning she is resting comfortably in bed and reports no issues or overnight events.    REVIEW OF SYMPTOMS:  CONSTITUTIONAL: No weakness, fevers or chills; No headaches  EYES: No visual changes, eye pain, or discharge  ENT: No vertigo; No ear pain or change in hearing; No sore throat or difficulty swallowing  NECK: No pain or stiffness  RESPIRATORY: No cough, wheezing, or hemoptysis; No shortness of breath  CARDIOVASCULAR: No chest pain or palpitations  GASTROINTESTINAL: No abdominal or epigastric pain; No nausea, vomiting, or hematemesis; No diarrhea or constipation; No melena or hematochezia  GENITOURINARY: No dysuria, frequency or hematuria  MUSCULOSKELETAL: No joint pain, no muscle pain, no weakness  NEUROLOGICAL: No numbness or weakness  SKIN: No itching or rashes    OBJECTIVE  PAST MEDICAL & SURGICAL HISTORY  COPD (chronic obstructive pulmonary disease)    HTN (hypertension)    Anxiety    History of cholecystectomy    History of hysterectomy      ALLERGIES:  Levaquin (Other)    MEDICATIONS:  STANDING MEDICATIONS  albuterol/ipratropium for Nebulization 3 milliLiter(s) Nebulizer every 4 hours  amLODIPine   Tablet 10 milliGRAM(s) Oral daily  ampicillin/sulbactam  IVPB      ampicillin/sulbactam  IVPB 3 Gram(s) IV Intermittent every 6 hours  budesonide 160 MICROgram(s)/formoterol 4.5 MICROgram(s) Inhaler 2 Puff(s) Inhalation two times a day  enalapril 5 milliGRAM(s) Oral daily  enoxaparin Injectable 40 milliGRAM(s) SubCutaneous every 24 hours  folic acid 1 milliGRAM(s) Oral daily  pantoprazole    Tablet 40 milliGRAM(s) Oral before breakfast  PARoxetine 10 milliGRAM(s) Oral daily  sodium zirconium cyclosilicate 5 Gram(s) Oral daily  tiotropium 18 MICROgram(s) Capsule 1 Capsule(s) Inhalation daily    PRN MEDICATIONS  acetaminophen     Tablet .. 650 milliGRAM(s) Oral every 6 hours PRN      VITAL SIGNS: Last 24 Hours  T(C): 36.9 (31 May 2022 12:11), Max: 36.9 (30 May 2022 17:41)  T(F): 98.5 (31 May 2022 12:11), Max: 98.5 (31 May 2022 12:11)  HR: 99 (31 May 2022 12:11) (88 - 116)  BP: 136/63 (31 May 2022 12:11) (107/56 - 170/84)  BP(mean): 90 (31 May 2022 12:11) (90 - 97)  RR: 26 (31 May 2022 12:11) (18 - 26)  SpO2: 94% (31 May 2022 12:11) (82% - 98%)    LABS:                        10.8   20.72 )-----------( 225      ( 31 May 2022 06:29 )             35.4     05-31    141  |  91<L>  |  52<H>  ----------------------------<  100<H>  5.9<H>   |  34<H>  |  1.4    Ca    9.0      31 May 2022 06:29  Mg     2.0     05-31    TPro  6.2  /  Alb  3.6  /  TBili  0.3  /  DBili  x   /  AST  23  /  ALT  9   /  AlkPhos  80  05-31              Culture - Urine (collected 29 May 2022 13:12)  Source: Catheterized Catheterized  Final Report (30 May 2022 14:48):    <10,000 CFU/mL Normal Urogenital Edie    Culture - Blood (collected 29 May 2022 13:02)  Source: .Blood Blood-Peripheral  Preliminary Report (30 May 2022 19:01):    No growth to date.    Culture - Blood (collected 29 May 2022 13:02)  Source: .Blood Blood-Peripheral  Preliminary Report (30 May 2022 19:01):    No growth to date.          RADIOLOGY:      PHYSICAL EXAM:  CONSTITUTIONAL: No acute distress, well-developed, well-groomed, AAOx3  HEAD: Atraumatic, normocephalic  EYES: EOM intact, PERRLA, conjunctiva and sclera clear  ENT: Supple, no masses, no thyromegaly, no bruits, no JVD; moist mucous membranes  PULMONARY: Clear to auscultation bilaterally; no wheezes, rales, or rhonchi  CARDIOVASCULAR: Regular rate and rhythm; no murmurs, rubs, or gallops  GASTROINTESTINAL: Soft, non-tender, non-distended; bowel sounds present  MUSCULOSKELETAL: 2+ peripheral pulses; no clubbing, no cyanosis, no edema  NEUROLOGY: non-focal  SKIN: No rashes or lesions; warm and dry    
VENTURA MOCK  76y, Female  Allergy: Levaquin (Other)      LOS  3d    CHIEF COMPLAINT:     INTERVAL EVENTS/HPI  - Fever, on BIPAP, procalcitonin 10   - T(F): , Max: 101.2 (05-31-22 @ 23:00)  - WBC Count: 12.25 (06-01-22 @ 06:43)  WBC Count: 20.72 (05-31-22 @ 06:29)  - Creatinine, Serum: 2.4 (06-01-22 @ 06:43)  Creatinine, Serum: 2.4 (05-31-22 @ 23:34)       ROS  unable to obtain history secondary to patient's mental status and/or sedation     VITALS:  T(F): 97.9, Max: 101.2 (05-31-22 @ 23:00)  HR: 110  BP: 116/58  RR: 24Vital Signs Last 24 Hrs  T(C): 36.6 (01 Jun 2022 08:00), Max: 38.4 (31 May 2022 23:00)  T(F): 97.9 (01 Jun 2022 08:00), Max: 101.2 (31 May 2022 23:00)  HR: 110 (01 Jun 2022 08:12) (94 - 115)  BP: 116/58 (01 Jun 2022 08:00) (100/55 - 136/63)  BP(mean): 81 (01 Jun 2022 08:00) (81 - 90)  RR: 24 (01 Jun 2022 08:00) (24 - 26)  SpO2: 97% (01 Jun 2022 08:12) (94% - 100%)    PHYSICAL EXAM:  Gen: on BIPAP   HEENT: Normocephalic, atraumatic  Neck: supple, no lymphadenopathy  CV: Regular rate & regular rhythm  Lungs: decreased BS at bases, no fremitus  Abdomen: Soft, BS present  Ext: Warm, well perfused  Neuro: non focal  Skin: no rash, no erythema  Lines: no phlebitis    FH: Non-contributory  Social Hx: Non-contributory    TESTS & MEASUREMENTS:                        9.8    12.25 )-----------( 214      ( 01 Jun 2022 06:43 )             33.8     06-01    146  |  93<L>  |  78<HH>  ----------------------------<  79  5.1<H>   |  37<H>  |  2.4<H>    Ca    8.7      01 Jun 2022 06:43  Mg     2.4     06-01    TPro  5.9<L>  /  Alb  3.4<L>  /  TBili  0.2  /  DBili  x   /  AST  28  /  ALT  12  /  AlkPhos  73  06-01      LIVER FUNCTIONS - ( 01 Jun 2022 06:43 )  Alb: 3.4 g/dL / Pro: 5.9 g/dL / ALK PHOS: 73 U/L / ALT: 12 U/L / AST: 28 U/L / GGT: x               Culture - Urine (collected 05-29-22 @ 13:12)  Source: Catheterized Catheterized  Final Report (05-30-22 @ 14:48):    <10,000 CFU/mL Normal Urogenital Edie    Culture - Blood (collected 05-29-22 @ 13:02)  Source: .Blood Blood-Peripheral  Preliminary Report (05-30-22 @ 19:01):    No growth to date.    Culture - Blood (collected 05-29-22 @ 13:02)  Source: .Blood Blood-Peripheral  Preliminary Report (05-30-22 @ 19:01):    No growth to date.        Blood Gas Venous - Lactate: 0.60 mmol/L (05-31-22 @ 16:51)  Blood Gas Venous - Lactate: 0.90 mmol/L (05-29-22 @ 19:35)  Blood Gas Venous - Lactate: 0.70 mmol/L (05-29-22 @ 17:26)  Blood Gas Venous - Lactate: 0.50 mmol/L (05-29-22 @ 15:36)  Blood Gas Venous - Lactate: 0.80 mmol/L (05-29-22 @ 12:32)      INFECTIOUS DISEASES TESTING  Procalcitonin, Serum: 10.30 (05-31-22 @ 06:29)  Legionella Antigen, Urine: Negative (05-29-22 @ 23:13)  Streptococcus pneumoniae Ag, Ur Result: Negative (05-29-22 @ 23:13)  COVID-19 PCR: NotDetec (05-23-22 @ 11:18)  Procalcitonin, Serum: 0.33 (04-29-22 @ 06:00)  Rapid RVP Result: NotDetec (04-28-22 @ 18:05)      INFLAMMATORY MARKERS      RADIOLOGY & ADDITIONAL TESTS:  I have personally reviewed the last available Chest xray  CXR  Xray Chest 1 View- PORTABLE-Urgent:   ACC: [id number] EXAM:  XR CHEST PORTABLE URGENT 1V                          PROCEDURE DATE:  05/31/2022          INTERPRETATION:  HISTORY & REASON FOR EXAM: Shortness of breath.    COMPARISON: Chest radiograph 5/29/2022.  Chest CT 5/23/2022      TECHNIQUE: AP chest radiograph.    FINDINGS:    Support devices: None.    Cardiac/mediastinum/hilum: Unremarkable.    Lung parenchyma/Pleura: No focal consolidation, pleural effusion, or   pneumothorax. Chronic interstitial changes/emphysema. Previous reported   nodular densities right midlung zone are not clearly delineated.     Consider follow-up CT scan to compare with earlier study. (In order to   document complete resolution).    Skeleton/soft tissues: No acute osseous abnormality.    IMPRESSION:    Chronic interstitial changes/emphysema.  Previous reported nodular densities right midlung zone are not clearly   delineated.     Consider follow-up noncontrast CT scan to compare with earlier study.   (In order to document complete resolution).    --- End of Report ---          RAMOS BLANKENSHIP MD; Resident Radiologist  This document has been electronically signed.  DARRIN DAY MD; Attending Radiologist  This document has been electronically signed. May 31 2022  9:35AM (05-31-22 @ 05:24)      CT      CARDIOLOGY TESTING  12 Lead ECG:   Ventricular Rate 101 BPM    Atrial Rate 101 BPM    P-R Interval 146 ms    QRS Duration 78 ms    Q-T Interval 330 ms    QTC Calculation(Bazett) 427 ms    P Axis 65 degrees    R Axis -56 degrees    T Axis 27 degrees    Diagnosis Line Sinus tachycardia with Premature supraventricular complexes  Left anterior fascicular block  Minimal voltage criteria for LVH, may be normal variant  Possible Anteroseptal infarct , age undetermined  Abnormal ECG    Confirmed by Shahram Mcmanus ([id number]) on 5/29/2022 3:02:20 PM (05-29-22 @ 13:42)  12 Lead ECG:   Ventricular Rate 82 BPM    Atrial Rate 82 BPM    P-R Interval 164 ms    QRS Duration 90 ms    Q-T Interval 394 ms    QTC Calculation(Bazett) 460 ms    P Axis 82 degrees    R Axis -73 degrees    T Axis 83 degrees    Diagnosis Line Normal sinus rhythm with sinus arrhythmia  Left anterior fascicular block  Abnormal ECG    Confirmed by Rowena Church MD ([id number]) on 5/23/2022 5:52:53 PM (05-23-22 @ 12:08)      MEDICATIONS  albuterol/ipratropium for Nebulization 3 Nebulizer every 4 hours  amLODIPine   Tablet 10 Oral daily  budesonide 160 MICROgram(s)/formoterol 4.5 MICROgram(s) Inhaler 2 Inhalation two times a day  enoxaparin Injectable 40 SubCutaneous every 24 hours  folic acid 1 Oral daily  lactated ringers. 1000 IV Continuous <Continuous>  pantoprazole    Tablet 40 Oral before breakfast  PARoxetine 10 Oral daily  piperacillin/tazobactam IVPB. 3.375 IV Intermittent once  piperacillin/tazobactam IVPB.. 3.375 IV Intermittent every 6 hours  sodium chloride 0.9%. 1000 IV Continuous <Continuous>  sodium zirconium cyclosilicate 5 Oral daily  tiotropium 18 MICROgram(s) Capsule 1 Inhalation daily      WEIGHT  Weight (kg): 54.4 (05-29-22 @ 12:16)  Creatinine, Serum: 2.4 mg/dL (06-01-22 @ 06:43)  Creatinine, Serum: 2.4 mg/dL (05-31-22 @ 23:34)      ANTIBIOTICS:  piperacillin/tazobactam IVPB. 3.375 Gram(s) IV Intermittent once  piperacillin/tazobactam IVPB.. 3.375 Gram(s) IV Intermittent every 6 hours      All available historical records have been reviewed

## 2022-06-01 NOTE — SWALLOW BEDSIDE ASSESSMENT ADULT - SWALLOW EVAL: RECOMMENDED DIET
NPO with alternate means for nutrition/hydration.
NPO with alternate means for nutrition/ hydration
puree diet w/ thin liquids

## 2022-06-01 NOTE — DISCHARGE NOTE FOR THE EXPIRED PATIENT - NS PATIENT DEATH CRITERIA
CMO/Patient is dead based on Cardiopulmonary criteria including absent breath sounds, pulselessness and/or asystole

## 2022-06-01 NOTE — CONSULT NOTE ADULT - ASSESSMENT
IMPRESSION:    Acute on chronic hypercapnic respiratory failure   Possible benzo overdose  HO COPD   Probable aspiration pneumonia   AURELIANO     PLAN:    CNS:  Avoid depressants.  1:1 sit for now.  Psych eval.  UDS SDS     HEENT: Oral care.      PULMONARY:  HOB @ 45 degrees.  Aspiration precautions.  Repeat ABG.  NIV on and off and during sleep.  Nebs PRN.  Continue home inhlaers.      CARDIOVASCULAR:  Continue hydration.      GI: GI prophylaxis.  Feeding outside NIV.  Bowel regimen     RENAL:  Follow up lytes.  Correct as needed.  [place] for strict is and OS.  Renal bladder US     INFECTIOUS DISEASE: Follow up cultures.  Procal.  Zosyn for now.  Nasal MRSA     HEMATOLOGICAL:  DVT prophylaxis.  DImer     ENDOCRINE:  Follow up FS.  Insulin protocol if needed.    MUSCULOSKELETAL:  Bed rest     SDU for now

## 2022-06-01 NOTE — CONSULT NOTE ADULT - SUBJECTIVE AND OBJECTIVE BOX
Patient is a 76y old  Female who presents with a chief complaint of     HPI:  76-year-old female history of COPD hypertension brought in by EMS for evaluation of lethargy.  EMS told us that the neighbor went over and found the patient with sluggish mental status with vomit around her mouth and had an empty bottle of Xanax next to her. Patient unable to answer if she took too much xanax intentionally or not. Patient is a poor historian. Of note, Patient reported history of aspiration ammonia and was recently admitted for aspiration PNA. Case was discussed with son who reports that there are no missing xanax pills and did not feel that was a contributing factor to the patient's symptoms.    In the ED, CXR is negative for acute process. Patient initially febrile with white count of 13 initial ABG with a pH of 7.22 and a bicarb of 128.  Patient became more alert with help with a repeat gas with improve however repeat gas showed a pH of 7.29 the CO2 of on Jocelyn 3.  Patient was placed on BiPAP and approximate 1 hour later repeat gas showed similar values.  Case discussed with pulmonary patient was placed on AVAPS with resulting improvement of the VBG.  VBG demonstrated a pH of 7.34 CO2 of 85.  Patient is stable admitted to the stepdown unit with suspected aspiration pneumonia patient clinically is improving mental status is improving. She is given cefepime and clindamycin in ED.     Vital Signs Last 24 Hrs  T(C): 37.9 (29 May 2022 15:21), Max: 38.3 (29 May 2022 12:30)  T(F): 100.2 (29 May 2022 15:21), Max: 101 (29 May 2022 12:30)  HR: 100 (29 May 2022 21:07) (90 - 114)  BP: 134/68 (29 May 2022 21:07) (129/68 - 148/72)  BP(mean): --  RR: 20 (29 May 2022 21:07) (20 - 26)  SpO2: 98% (29 May 2022 21:07) (98% - 99%) (29 May 2022 21:33)      PAST MEDICAL & SURGICAL HISTORY:  COPD (chronic obstructive pulmonary disease)      HTN (hypertension)      Anxiety      History of cholecystectomy      History of hysterectomy        FAMILY HISTORY:  Family history of COPD (chronic obstructive pulmonary disease)  In father    Family history of congestive heart failure    :  No known cardiovacular family hisotry     Review Of Systems:     All ROS are negative except per HPI       Allergies    Levaquin (Other)    Intolerances          PHYSICAL EXAM    ICU Vital Signs Last 24 Hrs  T(C): 37.1 (01 Jun 2022 06:40), Max: 38.4 (31 May 2022 23:00)  T(F): 98.7 (01 Jun 2022 06:40), Max: 101.2 (31 May 2022 23:00)  HR: 94 (01 Jun 2022 04:10) (94 - 115)  BP: 100/55 (01 Jun 2022 04:10) (100/55 - 136/63)  BP(mean): 90 (31 May 2022 12:11) (90 - 90)  ABP: --  ABP(mean): --  RR: 24 (01 Jun 2022 04:10) (20 - 26)  SpO2: 98% (01 Jun 2022 04:10) (94% - 98%)      CONSTITUTIONAL:  ill appearing in NAD    ENT:   Airway patent,   Mouth with normal mucosa.       CARDIAC:   Normal rate,   Regular rhythm.    No edema      RESPIRATORY:   No wheezing  Dec BS   Not tachypneic,  No use of accessory muscles    GASTROINTESTINAL:  Abdomen soft,   Non-tender,   No guarding,   + BS      NEUROLOGICAL:   Opens eyes  Follows simple commands   No motor deficits.    SKIN:   Skin normal color for race,   No evidence of rash.                05-31-22 @ 07:01  -  06-01-22 @ 07:00  --------------------------------------------------------  IN:  Total IN: 0 mL    OUT:    Indwelling Catheter - Urethral (mL): 550 mL  Total OUT: 550 mL    Total NET: -550 mL          LABS:                          10.8   20.72 )-----------( 225      ( 31 May 2022 06:29 )             35.4                                               05-31    145  |  91<L>  |  76<HH>  ----------------------------<  80  5.9<H>   |  34<H>  |  2.4<H>    Creatinine Trend  BUN 76, Cr 2.4, (05-31-22 @ 23:34)  Creatinine Trend  BUN 52, Cr 1.4, (05-31-22 @ 06:29)  Creatinine Trend  BUN 37, Cr 1.1, (05-30-22 @ 11:20)  Creatinine Trend  BUN 15, Cr 0.7, (05-29-22 @ 13:02)      Ca    8.9      31 May 2022 23:34  Mg     2.0     05-31    TPro  6.2  /  Alb  3.6  /  TBili  0.3  /  DBili  x   /  AST  23  /  ALT  9   /  AlkPhos  80  05-31                                                                                           LIVER FUNCTIONS - ( 31 May 2022 06:29 )  Alb: 3.6 g/dL / Pro: 6.2 g/dL / ALK PHOS: 80 U/L / ALT: 9 U/L / AST: 23 U/L / GGT: x                                                  Culture - Urine (collected 29 May 2022 13:12)  Source: Catheterized Catheterized  Final Report (30 May 2022 14:48):    <10,000 CFU/mL Normal Urogenital Edie    Culture - Blood (collected 29 May 2022 13:02)  Source: .Blood Blood-Peripheral  Preliminary Report (30 May 2022 19:01):    No growth to date.    Culture - Blood (collected 29 May 2022 13:02)  Source: .Blood Blood-Peripheral  Preliminary Report (30 May 2022 19:01):    No growth to date.                                                                                           X-Rays reviewed                                                                                     ECHO        MEDICATIONS  (STANDING):  albuterol/ipratropium for Nebulization 3 milliLiter(s) Nebulizer every 4 hours  amLODIPine   Tablet 10 milliGRAM(s) Oral daily  ampicillin/sulbactam  IVPB      ampicillin/sulbactam  IVPB 3 Gram(s) IV Intermittent every 6 hours  budesonide 160 MICROgram(s)/formoterol 4.5 MICROgram(s) Inhaler 2 Puff(s) Inhalation two times a day  enoxaparin Injectable 40 milliGRAM(s) SubCutaneous every 24 hours  folic acid 1 milliGRAM(s) Oral daily  pantoprazole    Tablet 40 milliGRAM(s) Oral before breakfast  PARoxetine 10 milliGRAM(s) Oral daily  sodium chloride 0.9%. 1000 milliLiter(s) (70 mL/Hr) IV Continuous <Continuous>  sodium zirconium cyclosilicate 5 Gram(s) Oral daily  tiotropium 18 MICROgram(s) Capsule 1 Capsule(s) Inhalation daily    MEDICATIONS  (PRN):  acetaminophen     Tablet .. 650 milliGRAM(s) Oral every 6 hours PRN Temp greater or equal to 38C (100.4F), Mild Pain (1 - 3)  acetaminophen  Suppository .. 650 milliGRAM(s) Rectal every 6 hours PRN Temp greater or equal to 38C (100.4F), Mild Pain (1 - 3)

## 2022-06-01 NOTE — SWALLOW BEDSIDE ASSESSMENT ADULT - SLP PERTINENT HISTORY OF CURRENT PROBLEM
76 y.o female Pt admitted w/ lethargy, found home s/p emesis ?took too many xanax accidentally +AHRF 2' asp PNA CXR (-)
76 y.o female Pt admitted w/ lethargy, found home s/p emesis ?took too many xanax accidentally +AHRF 2' asp PNA CXR (-)
Pt admitted w/ lethargy, found home s/p emesis ?took too many xanax accidentally +AHRF 2' asp PNA CXR (-)

## 2022-06-01 NOTE — SWALLOW BEDSIDE ASSESSMENT ADULT - SPECIFY REASON(S)
Dysphagia F/u
Dysphagia f/u
dysphagia eval (attempted in am, pt w/ desaturation, reassessed in pm w/ improved resp status)

## 2022-06-01 NOTE — CHART NOTE - NSCHARTNOTEFT_GEN_A_CORE
Comfort measures only    D/w son- Mr Baldev Freed; goals of care discussed. Family wants patient to be comfort measures only. They wants to take of the AVAP mask.  Family understands patient's terminal condition and imminent death with withdrawing care and wants to make patient comfort care only.  discussed with nursing staff.

## 2022-06-01 NOTE — SWALLOW BEDSIDE ASSESSMENT ADULT - SWALLOW EVAL: DIAGNOSIS
PO trials are not appropriate at this time Pt. is on continuous BIPAP.
+toleration of thin liquids and puree, increased resp effort w/ minced and moist trials, +baseline cough, dyspneic on 5L O2
PO trial sare not appropriate at this time Pt. is on continuous BIPAP.

## 2022-06-01 NOTE — ED ADULT NURSE REASSESSMENT NOTE - NS ED NURSE REASSESS COMMENT FT1
pt taken off BIPAP by internal medicine team and at pt's family's request. pt tolerated 4L NC O2 for about 30 minutes, then became bradycardic into the 30's, hypotensive at 70's over 40's, and developed labored breathing, pulse ox desaturated to 50's and below. MD at 3220 called to bedside. as per family, requests to honor pt's DNR/DNI status are in place. new IV access obtained, pt was provided ventilations via AMBU bag. peripheral IV Levophed initiated. IV push-dose epinephrine administered. LR pressure bag administered. 0.4 IV narcan also administered. pt remains lethargic, agonally breathing. BIPAP placed back on pt. family and MD's at bedside. will continue to monitor. pt taken off BIPAP by internal medicine team and at pt's family's request at around 1050AM. pt tolerated 4L NC O2 for about 15 minutes, then at 1105AM, pt became bradycardic into the 30's, hypotensive at 70's over 40's, and developed labored breathing, pulse ox desaturated to 50's and below. MD at 3220 called to bedside. as per family, requests to honor pt's DNR/DNI status are in place. new IV access obtained, pt was provided ventilations via AMBU bag. peripheral IV Levophed initiated. IV push-dose epinephrine administered. LR pressure bag administered. 0.4 IV narcan also administered. pt remains lethargic, agonally breathing. pt placed back on BIPAP. family and MD's at bedside. will continue to monitor.

## 2022-06-01 NOTE — ED ADULT NURSE REASSESSMENT NOTE - NS ED NURSE REASSESS COMMENT FT1
Pt desating on bipap - Md [phone] notified family wants comfort measures only and to have patient removed from monitor and bipap.  See Mar for meds administered for comfort.  Pt transferred to CC6 for family to be at bedside.  Will continue to monitor pt and perform comfort measures and assist family as needed.

## 2022-06-01 NOTE — PROGRESS NOTE ADULT - ASSESSMENT
76-year-old female history of COPD hypertension brought in by EMS for evaluation of lethargy. Neighbor went over and found the patient with sluggish mental status with vomit around her mouth and had an empty bottle of Xanax next to her. Pt now in acute hypoxic respiratory failure due to aspiration pneumonia.    #Acute on chronic hypoxic/hypercapnic respiratory failure  #Hx of COPD on Home O2 2L  #Possible aspiration pneumonitis vs pna  - Currently on AVAPs saturating 92-93%  - CXR 05/29: Chronic interstitial chagnes  - Cont unasyn 3g q6h  - BCx/UCx, urine legionella and strep ag  - Wean off BIPAP as tolerated -->pt is tachypneic saturating at 90-94%, will repeat ABG   - AVAPS and O2 4on-4off  - f/u echo  - S/S eval once off BIPAP  - Cont spiriva/symbicort    #HTN  - Cont amlodipine 10mg qD  - Cont enalapril 5mg qD    #Suspected folate deficiency  - Cont supplements    #Anxiety  - Cont paroxetine 10mg qD    DVT PPX, Lovenox    #Progress Note Handoff  Pending (specify): Wean off BIPAP as tolerated (currently tachypnic) will f/u throughout the day   Family discussion: d/w pt regarding eval for respiratory failure  Disposition: Home      
76-year-old female history of COPD hypertension brought in by EMS for evaluation of lethargy. Neighbor went over and found the patient with sluggish mental status with vomit around her mouth and had an empty bottle of Xanax next to her. Pt now in acute hypoxic respiratory failure due to aspiration pneumonia.    #Acute on chronic hypoxic/hypercapnic respiratory failure  #Hx of COPD on Home O2 2L  #Possible aspiration pneumonitis vs pna  Currently on AVAPs saturating 92-93%  CXR 05/29: Chronic interstitial chagnes  - Cont unasyn 3g q6h  - BCx/UCx, urine legionella and strep ag  - Wean off BIPAP as tolerated --> Trial of HFNC  - AVAPS and O2 4on-4off  - f/u echo  - S/S eval once off BIPAP  - Cont spiriva/symbicort    #HTN  - Cont amlodipine 10mg qD  - Cont enalapril 5mg qD    #Suspected folate deficiency  - Cont supplements    #Anxiety  - Cont paroxetine 10mg qD    DVT PPX, Lovenox    #Progress Note Handoff  Pending (specify): Wean off BIPAP as tolerated  Family discussion: d/w pt regarding eval for respiratory failure  Disposition: Home  
ASSESSMENT  76-year-old female history of COPD hypertension brought in by EMS for evaluation of lethargy. Questionable empty xanax bottle. ID consulted for PNA      IMPRESSION  #Suspected Aspiration pneumonitis with SIRS on admission T101 P>90 RR>20 , CXR on PNA with Acute hypercapnic respiratory failure    CXR Chronic interstitial changes without consolidation, effusion or pneumothorax.    5/29 Blood & Urine cxs NGTD     Procalcitonin, Serum: 10.30 (05-31-22 @ 06:29)- could be elevated in the setting of AURELIANO     UA without significant pyuria     Legionella Antigen, Urine: Negative (05-29-22 @ 23:13)    Streptococcus pneumoniae Ag, Ur Result: Negative (05-29-22 @ 23:13)    COVID-19 PCR: NotDetec (05-23-22 @ 11:18)  #Possible benzo OD  #Metabolic encephalopathy  #Abx allergy: Levaquin (Other)    Creatinine, Serum: 0.7 (05-29-22 @ 13:02)    Weight (kg): 54.4 (05-29-22 @ 12:16)    RECOMMENDATIONS  - SEND Repeat BCX as fever (none pending)  - [facility] . though CXR without consolidations to suggest PNA       If any questions, please call or send a message on [employer] Teams  Please continue to update ID with any pertinent new laboratory or radiographic findings  Spectra [phone]
76-year-old female history of COPD hypertension brought in by EMS for evaluation of lethargy. Neighbor went over and found the patient with sluggish mental status with vomit around her mouth and had an empty bottle of Xanax next to her. Pt now in acute hypoxic respiratory failure due to aspiration pneumonia.    #Acute on chronic hypoxic/hypercapnic respiratory failure  #Hx of COPD on Home O2 2L  #Possible aspiration pneumonitis vs pna  Currently on AVAPs saturating 100%  CXR 05/29: Chronic interstitial chagnes  - Cont unasyn 3g q6h  - BCx/UCx, urine legionella and strep ag  - Wean off BIPAP as tolerated  - AVAPS and O2 4on-4off  - f/u echo  - S/S eval once off BIPAP  - Cont spiriva/symbicort    #HTN  - Cont amlodipine 10mg qD  - Cont enalapril 5mg qD    #Suspected folate deficiency  - Cont supplements    #Anxiety  - Cont paroxetine 10mg qD    DVT PPX, Lovenox    #Progress Note Handoff  Pending (specify): Wean off BIPAP as tolerated  Family discussion: d/w pt regarding eval for respiratory failure  Disposition: Home

## 2022-06-01 NOTE — DISCHARGE NOTE FOR THE EXPIRED PATIENT - HOSPITAL COURSE
76-year-old female history of COPD hypertension brought in by EMS for evaluation of lethargy.  EMS told us that the neighbor went over and found the patient with sluggish mental status with vomit around her mouth and had an empty bottle of Xanax next to her. Patient unable to answer if she took too much xanax intentionally or not. Patient is a poor historian. Of note, Patient reported history of aspiration ammonia and was recently admitted for aspiration PNA. Case was discussed with son who reports that there are no missing xanax pills and did not feel that was a contributing factor to the patient's symptoms.  In the ED, CXR is negative for acute process. Patient initially febrile with white count of 13 initial ABG with a pH of 7.22 and a bicarb of 128.  Patient became more alert with help with a repeat gas with improve however repeat gas showed a pH of 7.29 the CO2 of on Jocelyn 3.  Patient was placed on BiPAP and approximate 1 hour later repeat gas showed similar values.  Case discussed with pulmonary patient was placed on AVAPS with resulting improvement of the VBG.  VBG demonstrated a pH of 7.34 CO2 of 85.  Patient is stable admitted to the stepdown unit with suspected aspiration pneumonia patient clinically is improving mental status is improving. She is given cefepime and clindamycin in ED.   xanax was discontinued. She was treated for COPD exacerbation. she was made DNR/DNI, was on AVAPS for days, we were unable to wean her off AVAPS due to advanced COPD, xray showed severe emphysema, She was a rapid response today, became hypoxic, bradycardic and hypotensive, s/p epi and IVF she improved, she was placed on AVAPS however family decided to make her CMO. AVAPs and other management discontinued. and pt  at 1:28 pm on 2022, family at bedside.    76-year-old female history of COPD hypertension brought in by EMS for evaluation of lethargy.  EMS told us that the neighbor went over and found the patient with sluggish mental status with vomit around her mouth and had an empty bottle of Xanax next to her. Patient unable to answer if she took too much xanax intentionally or not. Patient is a poor historian. Of note, Patient reported history of aspiration ammonia and was recently admitted for aspiration PNA. Case was discussed with son who reports that there are no missing xanax pills and did not feel that was a contributing factor to the patient's symptoms.  In the ED, CXR is negative for acute process. Patient initially febrile with white count of 13 initial ABG with a pH of 7.22 and a bicarb of 128.  Patient became more alert with help with a repeat gas with improve however repeat gas showed a pH of 7.29 the CO2 of on Jocelyn 3.  Patient was placed on BiPAP and approximate 1 hour later repeat gas showed similar values.  Case discussed with pulmonary patient was placed on AVAPS with resulting improvement of the VBG.  VBG demonstrated a pH of 7.34 CO2 of 85.  Patient is stable admitted to the stepdown unit with suspected aspiration pneumonia patient clinically is improving mental status is improving. She is given cefepime and clindamycin in ED.   xanax was discontinued. She was treated for COPD exacerbation. she was made DNR/DNI, was on AVAPS for days, we were unable to wean her off AVAPS due to advanced COPD, xray showed severe emphysema, She was a rapid response today, became hypoxic, bradycardic and hypotensive, s/p epi and IVF she improved. Patient had severe respiratory acidosis and she was placed on AVAP. Goals of care discussion was continued and family decided to make her Comfort measures only. AVAPs and other management discontinued. and pt  at 1:28 pm on 2022, family at bedside.

## 2022-06-01 NOTE — CHART NOTE - NSCHARTNOTEFT_GEN_A_CORE
NUTRITION SUPPORT CONSULTATION    HPI:  76-year-old female history of COPD hypertension brought in by EMS for evaluation of lethargy.  EMS told us that the neighbor went over and found the patient with sluggish mental status with vomit around her mouth and had an empty bottle of Xanax next to her. Patient unable to answer if she took too much xanax intentionally or not. Patient is a poor historian. Of note, Patient reported history of aspiration ammonia and was recently admitted for aspiration PNA. Case was discussed with son who reports that there are no missing xanax pills and did not feel that was a contributing factor to the patient's symptoms.    In the ED, CXR is negative for acute process. Patient initially febrile with white count of 13 initial ABG with a pH of 7.22 and a bicarb of 128.  Patient became more alert with help with a repeat gas with improve however repeat gas showed a pH of 7.29 the CO2 of on Jocelyn 3.  Patient was placed on BiPAP and approximate 1 hour later repeat gas showed similar values.  Case discussed with pulmonary patient was placed on AVAPS with resulting improvement of the VBG.  VBG demonstrated a pH of 7.34 CO2 of 85.  Patient is stable admitted to the stepdown unit with suspected aspiration pneumonia patient clinically is improving mental status is improving. She is given cefepime and clindamycin in ED.     Vital Signs Last 24 Hrs  T(C): 37.9 (29 May 2022 15:21), Max: 38.3 (29 May 2022 12:30)  T(F): 100.2 (29 May 2022 15:21), Max: 101 (29 May 2022 12:30)  HR: 100 (29 May 2022 21:07) (90 - 114)  BP: 134/68 (29 May 2022 21:07) (129/68 - 148/72)  BP(mean): --  RR: 20 (29 May 2022 21:07) (20 - 26)  SpO2: 98% (29 May 2022 21:07) (98% - 99%) (29 May 2022 21:33)    PAST MEDICAL & SURGICAL HISTORY:  COPD (chronic obstructive pulmonary disease)  HTN (hypertension)  Anxiety  History of cholecystectomy  History of hysterectomy    Allergies  Levaquin (Other)    MEDICATIONS  (STANDING):  albuterol/ipratropium for Nebulization 3 milliLiter(s) Nebulizer every 4 hours  amLODIPine   Tablet 10 milliGRAM(s) Oral daily  ampicillin/sulbactam  IVPB      ampicillin/sulbactam  IVPB 3 Gram(s) IV Intermittent every 6 hours  budesonide 160 MICROgram(s)/formoterol 4.5 MICROgram(s) Inhaler 2 Puff(s) Inhalation two times a day  enoxaparin Injectable 40 milliGRAM(s) SubCutaneous every 24 hours  folic acid 1 milliGRAM(s) Oral daily  pantoprazole    Tablet 40 milliGRAM(s) Oral before breakfast  PARoxetine 10 milliGRAM(s) Oral daily  sodium chloride 0.9%. 1000 milliLiter(s) (70 mL/Hr) IV Continuous <Continuous>  sodium zirconium cyclosilicate 5 Gram(s) Oral daily  tiotropium 18 MICROgram(s) Capsule 1 Capsule(s) Inhalation daily    MEDICATIONS  (PRN):  acetaminophen     Tablet .. 650 milliGRAM(s) Oral every 6 hours PRN Temp greater or equal to 38C (100.4F), Mild Pain (1 - 3)  acetaminophen  Suppository .. 650 milliGRAM(s) Rectal every 6 hours PRN Temp greater or equal to 38C (100.4F), Mild Pain (1 - 3)    ICU Vital Signs Last 24 Hrs  T(C): 36.6 (01 Jun 2022 08:00), Max: 38.4 (31 May 2022 23:00)  T(F): 97.9 (01 Jun 2022 08:00), Max: 101.2 (31 May 2022 23:00)  HR: 110 (01 Jun 2022 08:12) (94 - 115)  BP: 116/58 (01 Jun 2022 08:00) (100/55 - 136/63)  BP(mean): 81 (01 Jun 2022 08:00) (81 - 90)  RR: 24 (01 Jun 2022 08:00) (24 - 26)  SpO2: 97% (01 Jun 2022 08:12) (94% - 100%)    Drug Dosing Weight  Height (cm): 160 (29 May 2022 12:16)  Weight (kg): 54.4 (29 May 2022 12:16)  BMI (kg/m2): 21.3 (29 May 2022 12:16)  BSA (m2): 1.56 (29 May 2022 12:16)    EXAM     Abd:     LE:   Enteral access:  IV access:    LABS  06-01    146  |  93<L>  |  78<HH>  ----------------------------<  79  5.1<H>   |  37<H>  |  2.4<H>    Ca    8.7      01 Jun 2022 06:43  Mg     2.4     06-01    TPro  5.9<L>  /  Alb  3.4<L>  /  TBili  0.2  /  DBili  x   /  AST  28  /  ALT  12  /  AlkPhos  73  06-01                        9.8    12.25 )-----------( 214      ( 01 Jun 2022 06:43 )             33.8     CAPILLARY BLOOD GLUCOSE  POCT Blood Glucose.: 100 mg/dL (01 Jun 2022 02:50)    Diet, NPO:   Except Medications (05-29-22 @ 22:24)      ASSESSMENT          PLAN  - please add in phos and TG level to AM bloodwork already drawn today NUTRITION SUPPORT CONSULTATION    HPI:  76-year-old female history of COPD hypertension brought in by EMS for evaluation of lethargy.  EMS told us that the neighbor went over and found the patient with sluggish mental status with vomit around her mouth and had an empty bottle of Xanax next to her. Patient unable to answer if she took too much xanax intentionally or not. Patient is a poor historian. Of note, Patient reported history of aspiration ammonia and was recently admitted for aspiration PNA. Case was discussed with son who reports that there are no missing xanax pills and did not feel that was a contributing factor to the patient's symptoms.    In the ED, CXR is negative for acute process. Patient initially febrile with white count of 13 initial ABG with a pH of 7.22 and a bicarb of 128.  Patient became more alert with help with a repeat gas with improve however repeat gas showed a pH of 7.29 the CO2 of on Jocelyn 3.  Patient was placed on BiPAP and approximate 1 hour later repeat gas showed similar values.  Case discussed with pulmonary patient was placed on AVAPS with resulting improvement of the VBG.  VBG demonstrated a pH of 7.34 CO2 of 85.  Patient is stable admitted to the stepdown unit with suspected aspiration pneumonia patient clinically is improving mental status is improving. She is given cefepime and clindamycin in ED.     Pt seen in ED after rapid response. d/w medical team and planning on goals of care discussion with family, prefer to hold off on starting parenteral nutrition until after discussion and goals established.    Vital Signs Last 24 Hrs  T(C): 37.9 (29 May 2022 15:21), Max: 38.3 (29 May 2022 12:30)  T(F): 100.2 (29 May 2022 15:21), Max: 101 (29 May 2022 12:30)  HR: 100 (29 May 2022 21:07) (90 - 114)  BP: 134/68 (29 May 2022 21:07) (129/68 - 148/72)  BP(mean): --  RR: 20 (29 May 2022 21:07) (20 - 26)  SpO2: 98% (29 May 2022 21:07) (98% - 99%) (29 May 2022 21:33)    PAST MEDICAL & SURGICAL HISTORY:  COPD (chronic obstructive pulmonary disease)  HTN (hypertension)  Anxiety  History of cholecystectomy  History of hysterectomy    Allergies  Levaquin (Other)    MEDICATIONS  (STANDING):  albuterol/ipratropium for Nebulization 3 milliLiter(s) Nebulizer every 4 hours  amLODIPine   Tablet 10 milliGRAM(s) Oral daily  ampicillin/sulbactam  IVPB      ampicillin/sulbactam  IVPB 3 Gram(s) IV Intermittent every 6 hours  budesonide 160 MICROgram(s)/formoterol 4.5 MICROgram(s) Inhaler 2 Puff(s) Inhalation two times a day  enoxaparin Injectable 40 milliGRAM(s) SubCutaneous every 24 hours  folic acid 1 milliGRAM(s) Oral daily  pantoprazole    Tablet 40 milliGRAM(s) Oral before breakfast  PARoxetine 10 milliGRAM(s) Oral daily  sodium chloride 0.9%. 1000 milliLiter(s) (70 mL/Hr) IV Continuous <Continuous>  sodium zirconium cyclosilicate 5 Gram(s) Oral daily  tiotropium 18 MICROgram(s) Capsule 1 Capsule(s) Inhalation daily    MEDICATIONS  (PRN):  acetaminophen     Tablet .. 650 milliGRAM(s) Oral every 6 hours PRN Temp greater or equal to 38C (100.4F), Mild Pain (1 - 3)  acetaminophen  Suppository .. 650 milliGRAM(s) Rectal every 6 hours PRN Temp greater or equal to 38C (100.4F), Mild Pain (1 - 3)    ICU Vital Signs Last 24 Hrs  T(C): 36.6 (01 Jun 2022 08:00), Max: 38.4 (31 May 2022 23:00)  T(F): 97.9 (01 Jun 2022 08:00), Max: 101.2 (31 May 2022 23:00)  HR: 110 (01 Jun 2022 08:12) (94 - 115)  BP: 116/58 (01 Jun 2022 08:00) (100/55 - 136/63)  BP(mean): 81 (01 Jun 2022 08:00) (81 - 90)  RR: 24 (01 Jun 2022 08:00) (24 - 26)  SpO2: 97% (01 Jun 2022 08:12) (94% - 100%)    Drug Dosing Weight  Height (cm): 160 (29 May 2022 12:16)  Weight (kg): 54.4 (29 May 2022 12:16)  BMI (kg/m2): 21.3 (29 May 2022 12:16)  BSA (m2): 1.56 (29 May 2022 12:16)    EXAM  Lethargic, on AVAPS   Abd: soft, ND  Skin: no breakdown  Enteral access: none  IV access: peripheral     LABS  06-01    146  |  93<L>  |  78<HH>  ----------------------------<  79  5.1<H>   |  37<H>  |  2.4<H>    Ca    8.7      01 Jun 2022 06:43  Mg     2.4     06-01    TPro  5.9<L>  /  Alb  3.4<L>  /  TBili  0.2  /  DBili  x   /  AST  28  /  ALT  12  /  AlkPhos  73  06-01                        9.8    12.25 )-----------( 214      ( 01 Jun 2022 06:43 )             33.8     Mean Cell Volume: 103.0 fL (06.01.22 @ 06:43)  Red Cell Distrib Width: 13.5 % (06.01.22 @ 06:43)    CAPILLARY BLOOD GLUCOSE  POCT Blood Glucose.: 100 mg/dL (01 Jun 2022 02:50)    Diet, NPO:   Except Medications (05-29-22 @ 22:24)      ASSESSMENT  76-year-old female history of COPD hypertension brought in by EMS for evaluation of lethargy. Neighbor went over and found the patient with sluggish mental status with vomit around her mouth and had an empty bottle of Xanax next to her. Pt now in acute hypoxic respiratory failure due to aspiration pneumonia.      PLAN  - please add in phos and TG level to AM bloodwork already drawn today  - will f/u after goals of care discussion with family to determine if nutrition support intervention indicated NUTRITION SUPPORT CONSULTATION    76-year-old female history of COPD hypertension brought in by EMS for evaluation of lethargy.  EMS told us that the neighbor went over and found the patient with sluggish mental status with vomit around her mouth and had an empty bottle of Xanax next to her. Patient unable to answer if she took too much xanax intentionally or not. Patient is a poor historian. Of note, Patient reported history of aspiration ammonia and was recently admitted for aspiration PNA. Case was discussed with son who reports that there are no missing xanax pills and did not feel that was a contributing factor to the patient's symptoms.    In the ED, CXR is negative for acute process. Patient initially febrile with white count of 13 initial ABG with a pH of 7.22 and a bicarb of 128.  Patient became more alert with help with a repeat gas with improve however repeat gas showed a pH of 7.29 the CO2 of on Jocelyn 3.  Patient was placed on BiPAP and approximate 1 hour later repeat gas showed similar values.  Case discussed with pulmonary patient was placed on AVAPS with resulting improvement of the VBG.  VBG demonstrated a pH of 7.34 CO2 of 85.  Patient is stable admitted to the stepdown unit with suspected aspiration pneumonia patient clinically is improving mental status is improving. She is given cefepime and clindamycin in ED.     Pt seen in ED after rapid response. d/w medical team and planning on goals of care discussion with family, prefer to hold off on starting parenteral nutrition until after discussion and goals established.    Vital Signs Last 24 Hrs  T(C): 37.9 (29 May 2022 15:21), Max: 38.3 (29 May 2022 12:30)  T(F): 100.2 (29 May 2022 15:21), Max: 101 (29 May 2022 12:30)  HR: 100 (29 May 2022 21:07) (90 - 114)  BP: 134/68 (29 May 2022 21:07) (129/68 - 148/72)  BP(mean): --  RR: 20 (29 May 2022 21:07) (20 - 26)  SpO2: 98% (29 May 2022 21:07) (98% - 99%) (29 May 2022 21:33)    PAST MEDICAL & SURGICAL HISTORY:  COPD (chronic obstructive pulmonary disease)  HTN (hypertension)  Anxiety  History of cholecystectomy  History of hysterectomy    Allergies  Levaquin (Other)    MEDICATIONS  (STANDING):  albuterol/ipratropium for Nebulization 3 milliLiter(s) Nebulizer every 4 hours  amLODIPine   Tablet 10 milliGRAM(s) Oral daily  ampicillin/sulbactam  IVPB      ampicillin/sulbactam  IVPB 3 Gram(s) IV Intermittent every 6 hours  budesonide 160 MICROgram(s)/formoterol 4.5 MICROgram(s) Inhaler 2 Puff(s) Inhalation two times a day  enoxaparin Injectable 40 milliGRAM(s) SubCutaneous every 24 hours  folic acid 1 milliGRAM(s) Oral daily  pantoprazole    Tablet 40 milliGRAM(s) Oral before breakfast  PARoxetine 10 milliGRAM(s) Oral daily  sodium chloride 0.9%. 1000 milliLiter(s) (70 mL/Hr) IV Continuous <Continuous>  sodium zirconium cyclosilicate 5 Gram(s) Oral daily  tiotropium 18 MICROgram(s) Capsule 1 Capsule(s) Inhalation daily    MEDICATIONS  (PRN):  acetaminophen     Tablet .. 650 milliGRAM(s) Oral every 6 hours PRN Temp greater or equal to 38C (100.4F), Mild Pain (1 - 3)  acetaminophen  Suppository .. 650 milliGRAM(s) Rectal every 6 hours PRN Temp greater or equal to 38C (100.4F), Mild Pain (1 - 3)    ICU Vital Signs Last 24 Hrs  T(C): 36.6 (01 Jun 2022 08:00), Max: 38.4 (31 May 2022 23:00)  T(F): 97.9 (01 Jun 2022 08:00), Max: 101.2 (31 May 2022 23:00)  HR: 110 (01 Jun 2022 08:12) (94 - 115)  BP: 116/58 (01 Jun 2022 08:00) (100/55 - 136/63)  BP(mean): 81 (01 Jun 2022 08:00) (81 - 90)  RR: 24 (01 Jun 2022 08:00) (24 - 26)  SpO2: 97% (01 Jun 2022 08:12) (94% - 100%)    Drug Dosing Weight  Height (cm): 160 (29 May 2022 12:16)  Weight (kg): 54.4 (29 May 2022 12:16)  BMI (kg/m2): 21.3 (29 May 2022 12:16)  BSA (m2): 1.56 (29 May 2022 12:16)    EXAM  Lethargic, on AVAPS   Abd: soft, ND  Skin: no breakdown, turgor fair  Enteral access: none  IV access: peripheral     LABS  06-01    146  |  93<L>  |  78<HH>  ----------------------------<  79  5.1<H>   |  37<H>  |  2.4<H>    Ca    8.7      01 Jun 2022 06:43  Mg     2.4     06-01    TPro  5.9<L>  /  Alb  3.4<L>  /  TBili  0.2  /  DBili  x   /  AST  28  /  ALT  12  /  AlkPhos  73  06-01                        9.8    12.25 )-----------( 214      ( 01 Jun 2022 06:43 )             33.8     Mean Cell Volume: 103.0 fL (06.01.22 @ 06:43)  Red Cell Distrib Width: 13.5 % (06.01.22 @ 06:43)    CAPILLARY BLOOD GLUCOSE  POCT Blood Glucose.: 100 mg/dL (01 Jun 2022 02:50)    Diet, NPO:   Except Medications (05-29-22 @ 22:24)      ASSESSMENT  76-year-old female history of COPD hypertension brought in by EMS for evaluation of lethargy. Neighbor went over and found the patient with sluggish mental status with vomit around her mouth and had an empty bottle of Xanax next to her. Pt now in acute hypoxic respiratory failure due to aspiration pneumonia.      PLAN  - please add in phos and TG level to AM bloodwork already drawn today  - will f/u after goals of care discussion with family to determine if nutrition support intervention indicated  - addendum - decision made not to accelerate care - no PN today

## 2022-06-02 LAB — PROCALCITONIN SERPL-MCNC: 10.4 NG/ML — HIGH (ref 0.02–0.1)

## 2022-06-03 LAB
CULTURE RESULTS: SIGNIFICANT CHANGE UP
CULTURE RESULTS: SIGNIFICANT CHANGE UP
SPECIMEN SOURCE: SIGNIFICANT CHANGE UP
SPECIMEN SOURCE: SIGNIFICANT CHANGE UP

## 2022-06-07 DIAGNOSIS — J96.02 ACUTE RESPIRATORY FAILURE WITH HYPERCAPNIA: ICD-10-CM

## 2022-06-07 DIAGNOSIS — A41.9 SEPSIS, UNSPECIFIED ORGANISM: ICD-10-CM

## 2022-06-07 DIAGNOSIS — E87.2 ACIDOSIS: ICD-10-CM

## 2022-06-07 DIAGNOSIS — F32.A DEPRESSION, UNSPECIFIED: ICD-10-CM

## 2022-06-07 DIAGNOSIS — Z66 DO NOT RESUSCITATE: ICD-10-CM

## 2022-06-07 DIAGNOSIS — J43.9 EMPHYSEMA, UNSPECIFIED: ICD-10-CM

## 2022-06-07 DIAGNOSIS — N17.9 ACUTE KIDNEY FAILURE, UNSPECIFIED: ICD-10-CM

## 2022-06-07 DIAGNOSIS — F41.9 ANXIETY DISORDER, UNSPECIFIED: ICD-10-CM

## 2022-06-07 DIAGNOSIS — I10 ESSENTIAL (PRIMARY) HYPERTENSION: ICD-10-CM

## 2022-06-07 DIAGNOSIS — Z51.5 ENCOUNTER FOR PALLIATIVE CARE: ICD-10-CM

## 2022-06-07 DIAGNOSIS — G93.41 METABOLIC ENCEPHALOPATHY: ICD-10-CM

## 2022-06-07 DIAGNOSIS — Z88.1 ALLERGY STATUS TO OTHER ANTIBIOTIC AGENTS STATUS: ICD-10-CM
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
## 2022-06-07 DIAGNOSIS — Z99.81 DEPENDENCE ON SUPPLEMENTAL OXYGEN: ICD-10-CM

## 2022-06-07 DIAGNOSIS — J96.21 ACUTE AND CHRONIC RESPIRATORY FAILURE WITH HYPOXIA: ICD-10-CM

## 2022-06-07 DIAGNOSIS — J69.0 PNEUMONITIS DUE TO INHALATION OF FOOD AND VOMIT: ICD-10-CM

## 2022-06-07 DIAGNOSIS — E87.5 HYPERKALEMIA: ICD-10-CM

## 2022-06-07 DIAGNOSIS — J96.22 ACUTE AND CHRONIC RESPIRATORY FAILURE WITH HYPERCAPNIA: ICD-10-CM

## 2022-06-07 DIAGNOSIS — Z79.52 LONG TERM (CURRENT) USE OF SYSTEMIC STEROIDS: ICD-10-CM

## 2023-05-24 NOTE — CONSULT NOTE ADULT - CONSULT REQUESTED BY NAME
[Left] : left shoulder ED [NL (0-180)] : full active abduction 0-180 degrees [NL (0-70)] : full internal rotation 0-70 degrees [NL (0-90)] : full external rotation 0-90 degrees [Sitting] : sitting [5 ___] : forward flexion 5[unfilled]/5 [5___] : internal rotation 5[unfilled]/5 [] : no deformity
